# Patient Record
Sex: FEMALE | Race: WHITE | NOT HISPANIC OR LATINO | Employment: OTHER | URBAN - METROPOLITAN AREA
[De-identification: names, ages, dates, MRNs, and addresses within clinical notes are randomized per-mention and may not be internally consistent; named-entity substitution may affect disease eponyms.]

---

## 2017-01-19 ENCOUNTER — OFFICE VISIT (OUTPATIENT)
Dept: INTERNAL MEDICINE | Facility: CLINIC | Age: 67
End: 2017-01-19
Payer: MEDICARE

## 2017-01-19 ENCOUNTER — HOSPITAL ENCOUNTER (OUTPATIENT)
Dept: RADIOLOGY | Facility: CLINIC | Age: 67
Discharge: HOME | End: 2017-01-19
Attending: INTERNAL MEDICINE
Payer: COMMERCIAL

## 2017-01-19 ENCOUNTER — HOSPITAL ENCOUNTER (OUTPATIENT)
Dept: RADIOLOGY | Facility: CLINIC | Age: 67
Discharge: HOME | End: 2017-01-19
Attending: INTERNAL MEDICINE
Payer: MEDICARE

## 2017-01-19 VITALS
TEMPERATURE: 99 F | WEIGHT: 141.13 LBS | SYSTOLIC BLOOD PRESSURE: 110 MMHG | DIASTOLIC BLOOD PRESSURE: 60 MMHG | HEIGHT: 68 IN | BODY MASS INDEX: 21.39 KG/M2 | HEART RATE: 88 BPM | OXYGEN SATURATION: 98 %

## 2017-01-19 DIAGNOSIS — C50.412 MALIGNANT NEOPLASM OF UPPER-OUTER QUADRANT OF LEFT FEMALE BREAST: ICD-10-CM

## 2017-01-19 DIAGNOSIS — I10 HYPERTENSION, ESSENTIAL: Primary | ICD-10-CM

## 2017-01-19 DIAGNOSIS — E78.5 HYPERLIPIDEMIA, UNSPECIFIED HYPERLIPIDEMIA TYPE: ICD-10-CM

## 2017-01-19 DIAGNOSIS — M89.9 DISORDER OF BONE: ICD-10-CM

## 2017-01-19 PROCEDURE — 99213 OFFICE O/P EST LOW 20 MIN: CPT | Mod: PBBFAC | Performed by: FAMILY MEDICINE

## 2017-01-19 PROCEDURE — 99999 PR PBB SHADOW E&M-EST. PATIENT-LVL III: CPT | Mod: PBBFAC,,, | Performed by: FAMILY MEDICINE

## 2017-01-19 PROCEDURE — 99213 OFFICE O/P EST LOW 20 MIN: CPT | Mod: S$PBB,,, | Performed by: FAMILY MEDICINE

## 2017-01-19 PROCEDURE — 77080 DXA BONE DENSITY AXIAL: CPT | Mod: TC

## 2017-01-19 PROCEDURE — 77080 DXA BONE DENSITY AXIAL: CPT | Mod: 26,,, | Performed by: INTERNAL MEDICINE

## 2017-01-19 NOTE — PROGRESS NOTES
Subjective:       Patient ID: Graciela Aranda is a 66 y.o. female.    Chief Complaint: Follow-up    HPI  Review of Systems   Constitutional: Negative for chills, fatigue and fever.   HENT: Negative for congestion and trouble swallowing.    Eyes: Negative for redness.   Respiratory: Negative for cough, chest tightness and shortness of breath.    Cardiovascular: Negative for chest pain, palpitations and leg swelling.   Gastrointestinal: Negative for abdominal pain and blood in stool.   Genitourinary: Negative for hematuria and menstrual problem.   Musculoskeletal: Negative for arthralgias, back pain, gait problem, joint swelling, myalgias and neck pain.   Skin: Negative for color change and rash.   Neurological: Negative for tremors, speech difficulty, weakness, numbness and headaches.   Hematological: Negative for adenopathy. Does not bruise/bleed easily.   Psychiatric/Behavioral: Negative for behavioral problems, confusion and sleep disturbance. The patient is not nervous/anxious.        Objective:      Physical Exam   Constitutional: She is oriented to person, place, and time. She appears well-developed and well-nourished. No distress.   Neck: Neck supple.   Pulmonary/Chest: Effort normal.   Musculoskeletal: She exhibits no edema.        Right lower leg: She exhibits no edema.        Left lower leg: She exhibits no edema.   Neurological: She is alert and oriented to person, place, and time.   Skin: Skin is warm and dry. No rash noted.   Psychiatric: She has a normal mood and affect. Her behavior is normal. Judgment and thought content normal.   Nursing note and vitals reviewed.      Assessment:       1. Hypertension, essential    2. Hyperlipidemia, unspecified hyperlipidemia type    3. Malignant neoplasm of upper-outer quadrant of left female breast        Plan:   Graciela was seen today for follow-up.    Diagnoses and all orders for this visit:    Hypertension, essential  -     Basic metabolic panel; Future  -      Lipid panel; Future    Hyperlipidemia, unspecified hyperlipidemia type  -     Lipid panel; Future    Malignant neoplasm of upper-outer quadrant of left female breast      See meds, orders, follow up, routing and instructions sections of encounter.  A 66-year-old established female patient.  She is in for a followup on her blood   pressure, which is normal today.  She states that her mother is 92 with   semi-dementia and they are instituting care through [facility].  She is going to [place] soon.  I did suggest a flu shot prior to her   travels.  Continue her current medications.  I went over her laboratory with   her.      TEE/IN  dd: 01/19/2017 12:31:28 (CST)  td: 01/20/2017 00:40:17 (CST)  Doc ID   #[id number]  Job ID #[id number]    CC:

## 2017-01-19 NOTE — MR AVS SNAPSHOT
[facility]  [address]  [facility] [address]  Phone: [phone]  Fax: [phone]                  Graciela Aranda   2017 10:00 AM   Office Visit    Description:  Female : 1950   Provider:  Moy Patel MD   Department:  [facility]           Reason for Visit     Follow-up           Diagnoses this Visit        Comments    Hypertension, essential    -  Primary     Hyperlipidemia, unspecified hyperlipidemia type         Malignant neoplasm of upper-outer quadrant of left female breast                To Do List           Future Appointments        Provider Department Dept Phone    2017 11:00 AM [facility] [phone]      Goals (5 Years of Data)     None      Follow-Up and Disposition     Return in about 6 months (around 2017) for lab review (after future labs), Keep appointments with specialty providers..      [facility] On [facility]     [facility] On Call Nurse Care Line -  Assistance  Registered nurses in the [facility] On Call Center provide clinical advisement, health education, appointment booking, and other advisory services.  Call for this free service at [phone].             Medications           Message regarding Medications     Verify the changes and/or additions to your medication regime listed below are the same as discussed with your clinician today.  If any of these changes or additions are incorrect, please notify your healthcare provider.             Verify that the below list of medications is an accurate representation of the medications you are currently taking.  If none reported, the list may be blank. If incorrect, please contact your healthcare provider. Carry this list with you in case of emergency.           Current Medications     amlodipine (NORVASC) 2.5 MG tablet Take 1 tablet (2.5 mg total) by mouth once daily.    anastrozole (ARIMIDEX) 1 mg Tab Take 1 tablet (1 mg total) by mouth  "once daily.    epinephrine (EPIPEN) 0.3 mg/0.3 mL AtIn Inject 0.3 mLs (0.3 mg total) into the muscle once.    hydrochlorothiazide (HYDRODIURIL) 12.5 MG Tab Take 12.5 mg by mouth once daily.    ibuprofen (ADVIL,MOTRIN) 600 MG tablet TK 1 T PO Q 8 H  PRN P    lisinopril (PRINIVIL,ZESTRIL) 40 MG tablet Take 1 tablet (40 mg total) by mouth once daily.           Clinical Reference Information           Vital Signs - Last Recorded  Most recent update: 1/19/2017  9:51 AM by Ellie Dodson MA    BP Pulse Temp Ht Wt SpO2    110/60 88 99.3 °F (37.4 °C) 5' 8" (1.727 m) 64 kg (141 lb 1.5 oz) 98%    BMI                21.45 kg/m2          Blood Pressure          Most Recent Value    BP  110/60      Allergies as of 1/19/2017     Venom-wasp      Immunizations Administered on Date of Encounter - 1/19/2017     None      Orders Placed During Today's Visit     Future Labs/Procedures Expected by Expires    Basic metabolic panel  7/18/2017 (Approximate) 10/16/2017    Lipid panel  7/18/2017 (Approximate) 10/16/2017      [facility] Sign-Up     Activating your [facility] account is as easy as 1-2-3!     1) Visit [email or web address], select Sign Up Now, enter this activation code and your date of birth, then select Next.  Activation code not generated  Current Patient Portal Status: Account disabled      2) Create a username and password to use when you visit [facility] in the future and select a security question in case you lose your password and select Next.    3) Enter your e-mail address and click Sign Up!    Additional Information  If you have questions, please e-mail [email or web address] or call [phone] to talk to our [facility] staff. Remember, [facility] is NOT to be used for urgent needs. For medical emergencies, dial 911.         Smoking Cessation     If you would like to quit smoking:   You may be eligible for free services if you are a [place] resident and started smoking cigarettes before September 1, 1988.  Call the Smoking " Cessation Trust ([facility]) toll free at (423) 870-2783 or (536) 384-7623.   Call 7-800-QUIT-NOW if you do not meet the above criteria.

## 2017-01-25 ENCOUNTER — TELEPHONE (OUTPATIENT)
Dept: INTERNAL MEDICINE | Facility: CLINIC | Age: 67
End: 2017-01-25

## 2017-01-25 NOTE — TELEPHONE ENCOUNTER
----- Message from Jean Cabral sent at 1/23/2017  8:31 AM CST -----  Contact: self/ [phone] cell  Pt is calling to inform the office that she got her flu shot on Friday, the 20th.  She wants it added to her file.  Please call and advise.    Thank you

## 2017-03-11 DIAGNOSIS — I10 ESSENTIAL HYPERTENSION: ICD-10-CM

## 2017-03-13 RX ORDER — LISINOPRIL 40 MG/1
TABLET ORAL
Qty: 90 TABLET | Refills: 0 | Status: SHIPPED | OUTPATIENT
Start: 2017-03-13 | End: 2017-08-09 | Stop reason: SDUPTHER

## 2017-03-21 ENCOUNTER — OFFICE VISIT (OUTPATIENT)
Dept: HEMATOLOGY/ONCOLOGY | Facility: CLINIC | Age: 67
End: 2017-03-21
Payer: MEDICARE

## 2017-03-21 VITALS
DIASTOLIC BLOOD PRESSURE: 62 MMHG | OXYGEN SATURATION: 99 % | RESPIRATION RATE: 20 BRPM | TEMPERATURE: 99 F | BODY MASS INDEX: 22.43 KG/M2 | SYSTOLIC BLOOD PRESSURE: 138 MMHG | WEIGHT: 147.5 LBS | HEART RATE: 89 BPM

## 2017-03-21 DIAGNOSIS — C50.412 MALIGNANT NEOPLASM OF UPPER-OUTER QUADRANT OF LEFT FEMALE BREAST: Primary | ICD-10-CM

## 2017-03-21 DIAGNOSIS — E55.9 VITAMIN D DEFICIENCY: ICD-10-CM

## 2017-03-21 PROCEDURE — 99214 OFFICE O/P EST MOD 30 MIN: CPT | Mod: S$PBB,,, | Performed by: INTERNAL MEDICINE

## 2017-03-21 PROCEDURE — 99213 OFFICE O/P EST LOW 20 MIN: CPT | Mod: PBBFAC | Performed by: INTERNAL MEDICINE

## 2017-03-21 PROCEDURE — 99999 PR PBB SHADOW E&M-EST. PATIENT-LVL III: CPT | Mod: PBBFAC,,, | Performed by: INTERNAL MEDICINE

## 2017-03-21 NOTE — PROGRESS NOTES
Subjective:       Patient ID: Graciela Aranda is a 66 y.o. female.    Chief Complaint: Follow-up    HPI     Doing well in the interval.  Weight stable.  Good energy level- active.  No pain but still reports peripheral neuropathy which is stable.  Tolerating Arimidex well.  No musculoskeletal complaints  Not taking Vit D and calcium stopped with elevated level of calcium      Diagnosis:  This is a 66 year old female with T2N1 left breast cancer (ER/KY positive, HER-2 negative) history outlined below and currently on adjuvant Arimidex.  Oncology History:  The patient is a 66-year-old white female with left invasive breast cancer (T2 N1 M0) ER+, KY+, Her2 negative with positive sentinel lymph node from left axilla who completed 4 cycles of AC and 4 cycles of Taxol in the neoadjuvant setting.  Post bilateral mastectomy on 05/29/2015 with Dr. Monte.. Her final pathology revealed:  The left breast had a 2.2 cm of residual invasive ductal carcinoma of overall grade 2 disease. Surgical margins were negative.  The right breast revealed no evidence of invasive carcinoma. +ADH  There were six left sentinel lymph nodes sampled within four sentinel lymph node specimens;  three of these lymph nodes were positive, all of which were noted intraoperatively to be positive. The sizes of the metastatic foci were 3 mm, 2    mm and 3 mm respectively.   She had been consented for the NSABP B-51, an [place] trial with the positive lymph node intraoperatively. She was randomized to no    axillary lymph node dissection and will be proceeding with adjuvant left axillary radiotherapy.  She was initially scheduled for a left total complete mastectomy without reconstruction and with sentinel lymph node biopsy 11/03/2014. But the preop MRI, revealed a contralateral suspicious right-sided abnormality located at the 3 o'clock position of the right breast. The MRI also revealed a suspicious lymph node in the left axilla. We obtained target  ultrasound of both areas, which confirmed the areas of abnormality in both the right medial breast at the 3 o'clock position as well as a 15 mm left axillary lymph node.She has undergone ultrasound-guided core needle biopsy of the right medial breast mass as well as the left axillary lymph node.   The pathology from right breast mass show fibroadenoma but axillary lymph nodes is positive for involvement with adenocarcinoma ER/ND positive, HER-2/anderson negative tumor.    Her left-sided breast cancer also revealed an ER/ND positive, HER-2/anderson negative tumor that was approximately 5 cm on clinical presentation and on MRI, this mass also measured 41 mm in the middle lower outer region of the left breast located 5 cm from the nipple.       Health maintenance  BMD:  BONE MINERAL DENSITY RESULTS:  Lumbar Spine: Lumbar bone mineral density L1-L4 is 1.190g/cm2, which is a t-score of 1.3. The z-score is 3.2.    Total Hip: The total hip bone mineral density is 0.872g/cm2.  The t-score is -0.6, and the z-score is 0.8.  Femoral neck BMD is 0.770g/cm2 and the t-score is -0.7.    COMPARISONS:  Date Location BMD T-score  10/08/15 L-spine 1.179 1.2  Total Hip 0.873 -0.6   Impression    Elevated BMD of the lumbar spine. No significant change since prior study.      Recommendations:  1) Adequate calcium and Vitamin D therapy  2) Appropriate exercise  3) Consider x-ray of lumbar spine to evaluate elevated Z score of 3.2.  4) Stop smoking  5) Consider repeat BMD in 4 years       Colonoscopy - Had her colonoscopy in the interval which revealed 1 - 3mm polyp that was removed, hyperplastic on pathology. Repeat recommended in 5 years (2021).    Review of Systems   Constitutional: Negative for activity change, appetite change, chills, diaphoresis, fatigue, fever and unexpected weight change.   HENT: Negative for congestion, dental problem, ear pain, hearing loss, mouth sores, nosebleeds, rhinorrhea, tinnitus and trouble swallowing.    Eyes:  Negative for redness and visual disturbance.   Respiratory: Negative for cough, chest tightness, shortness of breath and wheezing.    Cardiovascular: Negative for chest pain, palpitations and leg swelling.   Gastrointestinal: Negative for abdominal distention, abdominal pain, blood in stool, constipation, diarrhea, nausea and vomiting.   Genitourinary: Negative for decreased urine volume, difficulty urinating, dysuria, frequency, hematuria and urgency.   Musculoskeletal: Negative for arthralgias, back pain, gait problem, joint swelling and neck pain.   Skin: Negative for color change, pallor, rash and wound.   Neurological: Negative for dizziness, weakness, light-headedness, numbness and headaches.   Hematological: Negative for adenopathy. Does not bruise/bleed easily.   Psychiatric/Behavioral: Negative for confusion, dysphoric mood and sleep disturbance.       Objective:      Physical Exam   Constitutional: She is oriented to person, place, and time. She appears well-developed and well-nourished. No distress.   Presents alone  ECOG=0   HENT:   Head: Normocephalic and atraumatic.   Right Ear: External ear normal.   Left Ear: External ear normal.   Nose: Nose normal.   Mouth/Throat: Oropharynx is clear and moist. No oropharyngeal exudate.   Eyes: Conjunctivae and EOM are normal. Pupils are equal, round, and reactive to light. Right eye exhibits no discharge. Left eye exhibits no discharge. No scleral icterus.   Neck: Normal range of motion. Neck supple. No tracheal deviation present. No thyromegaly present.   Cardiovascular: Normal rate, regular rhythm, normal heart sounds and intact distal pulses.  Exam reveals no gallop and no friction rub.    No murmur heard.  Pulmonary/Chest: Effort normal and breath sounds normal. No respiratory distress. She has no wheezes. She has no rales. She exhibits no tenderness. Right breast exhibits no skin change. Left breast exhibits no mass, no skin change and no tenderness. Breasts  are symmetrical.   Post bilateral mastectomy without evidence of chest wall recurrence  No lymphadenopathy   Abdominal: Soft. Bowel sounds are normal. She exhibits no distension and no mass. There is no tenderness. There is no rebound and no guarding.   No organomegaly   Musculoskeletal: Normal range of motion. She exhibits no edema.   Lymphadenopathy:     She has no cervical adenopathy.   Neurological: She is alert and oriented to person, place, and time. No cranial nerve deficit. She exhibits normal muscle tone. Coordination normal.   Skin: Skin is warm and dry. No rash noted. She is not diaphoretic. No erythema. No pallor.   Psychiatric: She has a normal mood and affect. Her behavior is normal. Judgment and thought content normal.   Nursing note and vitals reviewed.    Labs- reviewed  Assessment:       1. Malignant neoplasm of upper-outer quadrant of left female breast    2. Vitamin D deficiency        Plan:     1. ANU clinically  Continue Tamoxifen  Knows to call for any issues.  2. Restart Vit D only

## 2017-03-22 PROBLEM — E55.9 VITAMIN D DEFICIENCY: Status: ACTIVE | Noted: 2017-03-22

## 2017-04-17 RX ORDER — HYDROCHLOROTHIAZIDE 12.5 MG/1
TABLET ORAL
Qty: 90 TABLET | Refills: 0 | Status: SHIPPED | OUTPATIENT
Start: 2017-04-17 | End: 2017-12-26 | Stop reason: SDUPTHER

## 2017-04-25 DIAGNOSIS — C50.919 MALIGNANT NEOPLASM OF FEMALE BREAST, UNSPECIFIED LATERALITY, UNSPECIFIED SITE OF BREAST: ICD-10-CM

## 2017-04-25 RX ORDER — ANASTROZOLE 1 MG/1
1 TABLET ORAL DAILY
Qty: 30 TABLET | Refills: 5 | Status: SHIPPED | OUTPATIENT
Start: 2017-04-25 | End: 2018-01-19 | Stop reason: SDUPTHER

## 2017-06-01 ENCOUNTER — TELEPHONE (OUTPATIENT)
Dept: ADMINISTRATIVE | Facility: OTHER | Age: 67
End: 2017-06-01

## 2017-06-01 NOTE — TELEPHONE ENCOUNTER
----- Message from Lili Rahman sent at 5/31/2017  1:15 PM CDT -----  Contact: Self   Pt needs to make a f/u appt with Karina. Please call pt back at [phone]

## 2017-06-09 DIAGNOSIS — I10 ESSENTIAL HYPERTENSION: ICD-10-CM

## 2017-06-09 RX ORDER — LISINOPRIL 40 MG/1
TABLET ORAL
Qty: 90 TABLET | Refills: 0 | Status: SHIPPED | OUTPATIENT
Start: 2017-06-09 | End: 2017-09-05 | Stop reason: SDUPTHER

## 2017-06-27 ENCOUNTER — CLINICAL SUPPORT (OUTPATIENT)
Dept: SMOKING CESSATION | Facility: CLINIC | Age: 67
End: 2017-06-27
Payer: COMMERCIAL

## 2017-06-27 DIAGNOSIS — F17.200 NICOTINE DEPENDENCE: Primary | ICD-10-CM

## 2017-06-27 PROCEDURE — 99407 BEHAV CHNG SMOKING > 10 MIN: CPT | Mod: S$GLB,,,

## 2017-07-03 ENCOUNTER — OFFICE VISIT (OUTPATIENT)
Dept: INTERNAL MEDICINE | Facility: CLINIC | Age: 67
End: 2017-07-03
Payer: MEDICARE

## 2017-07-03 ENCOUNTER — LAB VISIT (OUTPATIENT)
Dept: LAB | Facility: HOSPITAL | Age: 67
End: 2017-07-03
Attending: INTERNAL MEDICINE
Payer: MEDICARE

## 2017-07-03 ENCOUNTER — OFFICE VISIT (OUTPATIENT)
Dept: HEMATOLOGY/ONCOLOGY | Facility: CLINIC | Age: 67
End: 2017-07-03
Payer: COMMERCIAL

## 2017-07-03 VITALS
RESPIRATION RATE: 16 BRPM | BODY MASS INDEX: 21.87 KG/M2 | TEMPERATURE: 98 F | WEIGHT: 147.69 LBS | HEIGHT: 69 IN | HEART RATE: 80 BPM | SYSTOLIC BLOOD PRESSURE: 160 MMHG | DIASTOLIC BLOOD PRESSURE: 76 MMHG

## 2017-07-03 VITALS
WEIGHT: 147.69 LBS | HEART RATE: 80 BPM | HEIGHT: 69 IN | DIASTOLIC BLOOD PRESSURE: 70 MMHG | BODY MASS INDEX: 21.87 KG/M2 | SYSTOLIC BLOOD PRESSURE: 166 MMHG

## 2017-07-03 DIAGNOSIS — Z00.00 ENCOUNTER FOR PREVENTIVE HEALTH EXAMINATION: Primary | ICD-10-CM

## 2017-07-03 DIAGNOSIS — C50.412 MALIGNANT NEOPLASM OF UPPER-OUTER QUADRANT OF LEFT FEMALE BREAST: ICD-10-CM

## 2017-07-03 DIAGNOSIS — C50.412 MALIGNANT NEOPLASM OF UPPER-OUTER QUADRANT OF LEFT FEMALE BREAST: Primary | ICD-10-CM

## 2017-07-03 DIAGNOSIS — E78.5 HYPERLIPIDEMIA, UNSPECIFIED HYPERLIPIDEMIA TYPE: ICD-10-CM

## 2017-07-03 DIAGNOSIS — F17.200 SMOKER: ICD-10-CM

## 2017-07-03 DIAGNOSIS — E55.9 VITAMIN D DEFICIENCY: ICD-10-CM

## 2017-07-03 DIAGNOSIS — I10 HYPERTENSION, ESSENTIAL: ICD-10-CM

## 2017-07-03 DIAGNOSIS — Z90.13 S/P BILATERAL MASTECTOMY: ICD-10-CM

## 2017-07-03 LAB
ALBUMIN SERPL BCP-MCNC: 4.2 G/DL
ALP SERPL-CCNC: 82 U/L
ALT SERPL W/O P-5'-P-CCNC: 21 U/L
ANION GAP SERPL CALC-SCNC: 13 MMOL/L
AST SERPL-CCNC: 17 U/L
BILIRUB SERPL-MCNC: 0.5 MG/DL
BUN SERPL-MCNC: 22 MG/DL
CALCIUM SERPL-MCNC: 10.3 MG/DL
CHLORIDE SERPL-SCNC: 104 MMOL/L
CO2 SERPL-SCNC: 20 MMOL/L
CREAT SERPL-MCNC: 1 MG/DL
ERYTHROCYTE [DISTWIDTH] IN BLOOD BY AUTOMATED COUNT: 13.8 %
EST. GFR  (AFRICAN AMERICAN): >60 ML/MIN/1.73 M^2
EST. GFR  (NON AFRICAN AMERICAN): 58.4 ML/MIN/1.73 M^2
GLUCOSE SERPL-MCNC: 107 MG/DL
HCT VFR BLD AUTO: 41.9 %
HGB BLD-MCNC: 14.2 G/DL
MCH RBC QN AUTO: 34.9 PG
MCHC RBC AUTO-ENTMCNC: 33.9 %
MCV RBC AUTO: 103 FL
NEUTROPHILS # BLD AUTO: 5.7 K/UL
PLATELET # BLD AUTO: 186 K/UL
PMV BLD AUTO: 10.1 FL
POTASSIUM SERPL-SCNC: 4.4 MMOL/L
PROT SERPL-MCNC: 7.4 G/DL
RBC # BLD AUTO: 4.07 M/UL
SODIUM SERPL-SCNC: 137 MMOL/L
WBC # BLD AUTO: 7.76 K/UL

## 2017-07-03 PROCEDURE — 1157F ADVNC CARE PLAN IN RCRD: CPT | Mod: ,,, | Performed by: INTERNAL MEDICINE

## 2017-07-03 PROCEDURE — 99999 PR PBB SHADOW E&M-EST. PATIENT-LVL III: CPT | Mod: PBBFAC,,, | Performed by: INTERNAL MEDICINE

## 2017-07-03 PROCEDURE — 85027 COMPLETE CBC AUTOMATED: CPT

## 2017-07-03 PROCEDURE — 99214 OFFICE O/P EST MOD 30 MIN: CPT | Mod: S$PBB,,, | Performed by: INTERNAL MEDICINE

## 2017-07-03 PROCEDURE — 1159F MED LIST DOCD IN RCRD: CPT | Mod: ,,, | Performed by: INTERNAL MEDICINE

## 2017-07-03 PROCEDURE — 1126F AMNT PAIN NOTED NONE PRSNT: CPT | Mod: ,,, | Performed by: INTERNAL MEDICINE

## 2017-07-03 PROCEDURE — G0438 PPPS, INITIAL VISIT: HCPCS | Mod: ,,, | Performed by: NURSE PRACTITIONER

## 2017-07-03 PROCEDURE — 36415 COLL VENOUS BLD VENIPUNCTURE: CPT

## 2017-07-03 PROCEDURE — 99999 PR PBB SHADOW E&M-EST. PATIENT-LVL III: CPT | Mod: PBBFAC,,, | Performed by: NURSE PRACTITIONER

## 2017-07-03 PROCEDURE — 99213 OFFICE O/P EST LOW 20 MIN: CPT | Mod: PBBFAC | Performed by: NURSE PRACTITIONER

## 2017-07-03 PROCEDURE — 80053 COMPREHEN METABOLIC PANEL: CPT

## 2017-07-03 NOTE — Clinical Note
Dr. Briceño,  I had the pleasure of seeing Ms. Aranda for an annual wellness visit today.  I scheduled her an appointment for 8/9/17 with you for a routine follow up and a blood pressure check.  In your last note you mentioned wanting lab work on her before your next visit.  I saw a lipid panel ordered.  Are there any other labs you want ordered?  If you want her to get labs, could Sandy or Ellie call her to schedule a lab appointment prior to her visit with you on August 9th.  Thank you.  Hoda Aaron NP

## 2017-07-03 NOTE — PROGRESS NOTES
"Graciela Aranda presented for an initial Medicare AWV today. The following components were reviewed and updated:    · Medical history  · Family History  · Social history  · Allergies and Current Medications  · Health Risk Assessment  · Health Maintenance  · Care Team    **See Completed Assessments for Annual Wellness visit with in the encounter summary    The following assessments were completed:  · Depression Screening  · Cognitive function Screening  · Timed Get Up Test  · Whisper Test            Vitals:    07/03/17 1306   BP: (!) 166/70   BP Location: Left arm   Patient Position: Sitting   Pulse: 80   Weight: 67 kg (147 lb 11.3 oz)   Height: 5' 8.5" (1.74 m)     Body mass index is 22.13 kg/m².  Waist Measurements: 34 in (in)]    Diagnoses and health risks identified today and associated recommendations/orders:  1. Encounter for preventive health examination  Assessments completed  Preventative health recommendations reviewed    2. Hypertension, essential  Elevated at today's visit.  Patient says that she gets extremely anxious when going to her hem/onc appointments.    She says her bp's have been running in the 120s/70s at home.  She denies headache, chest pain, sob, and sudden vision changes.  Follow up appointment made for august with her PCP for routine follow up and a bp check.  She will continue to monitor her blood pressures at home in the meantime  Followed by pcp.     3. Malignant neoplasm of upper-outer quadrant of left female breast  Stable. Hx of bilateral mastectomy and radiation therapy  Controlled with current medical therapy, on arimidex  Followed by hem/onc    4. S/P bilateral mastectomy  Stable.   Controlled with current medical therapy  Followed by hem/onc    5. Hyperlipidemia, unspecified hyperlipidemia type  Stable.   Diet controlled  Followed by PCP.     6. Smoker  Stable.   Involved in smoking cessation program currently  Followed by PCP.     Provided Graciela with a 5-10 year written screening " schedule and personal prevention plan. Recommendations were developed using the USPSTF age appropriate recommendations. Education, counseling, and referrals were provided as needed.  After Visit Summary printed and given to patient which includes a list of additional screenings\tests needed.    Return in 5 weeks (on 8/9/2017) for routine visit with your primary care provider or sooner if problems arise. .      Hoda Aaron, NP

## 2017-07-03 NOTE — PATIENT INSTRUCTIONS
Counseling and Referral of Other Preventative  (Italic type indicates deductible and co-insurance are waived)    Patient Name: Graciela Aranda  Today's Date: 7/3/2017      SERVICE LIMITATIONS RECOMMENDATION    Vaccines    · Pneumococcal (once after 65)    · Influenza (annually)    · Hepatitis B (if medium/high risk)    · Prevnar 13      Hepatitis B medium/high risk factors:       - End-stage renal disease       - Hemophiliacs who received Factor VII or         IX concentrates       - Clients of institutions for the mentally             retarded       - Persons who live in the same house as          a HepB carrier       - Homosexual men       - Illicit injectable drug abusers     Pneumococcal: Due one year after your prevnar vaccine which was 07/27/16     Influenza: Due in September/october of this year     Hepatitis B: N/A     Prevnar 13: Done, no repeat necessary    Mammogram (biennial age 50-74)  Annually (age 40 or over)  N/A    Pap (up to age 70 and after 70 if unknown history or abnormal study last 10 years)    N/A     The USPSTF recommends against screening for cervical cancer in women older than age 65 years who have had adequate prior screening and are not otherwise at high risk for cervical cancer.      Colorectal cancer screening (to age 75)    · Fecal occult blood test (annual)  · Flexible sigmoidoscopy (5y)  · Screening colonoscopy (10y)  · Barium enema   Last done 06/01/16, recommend to repeat every 5  years    Diabetes self-management training (no USPSTF recommendations)  Requires referral by treating physician for patient with diabetes or renal disease. 10 hours of initial DSMT sessions of no less than 30 minutes each in a continuous 12-month period. 2 hours of follow-up DSMT in subsequent years.  N/A    Bone mass measurements (age 65 & older, biennial)  Requires diagnosis related to osteoporosis or estrogen deficiency. Biennial benefit unless patient has history of long-term glucocorticoid  Last done  01/19/17, recommend to repeat every 4  years    Glaucoma screening (no USPSTF recommendation)  Diabetes mellitus, family history   , age 50 or over    American, age 65 or over  Due for visit    Medical nutrition therapy for diabetes or renal disease (no recommended schedule)  Requires referral by treating physician for patient with diabetes or renal disease or kidney transplant within the past 3 years.  Can be provided in same year as diabetes self-management training (DSMT), and CMS recommends medical nutrition therapy take place after DSMT. Up to 3 hours for initial year and 2 hours in subsequent years.  N/A    Cardiovascular screening blood tests (every 5 years)  · Fasting lipid panel  Order as a panel if possible  Last done 03/14/16, recommend to repeat every 1  years    Diabetes screening tests (at least every 3 years, Medicare covers annually or at 6-month intervals for prediabetic patients)  · Fasting blood sugar (FBS) or glucose tolerance test (GTT)  Patient must be diagnosed with one of the following:       - Hypertension       - Dyslipidemia       - Obesity (BMI 30kg/m2)       - Previous elevated impaired FBS or GTT       ... or any two of the following:       - Overweight (BMI 25 but <30)       - Family history of diabetes       - Age 65 or older       - History of gestational diabetes or birth of baby weighing more than 9 pounds  Done this year, repeat every year    Abdominal aortic aneurysm screening (once)  · Sonogram   Limited to patients who meet one of the following criteria:       - Men who are 65-75 years old and have smoked more than 100 cigarette in their lifetime       - Anyone with a family history of abdominal aortic aneurysm       - Anyone recommended for screening by the USPSTF  N/A    HIV screening (annually for increased risk patients)  · HIV-1 and HIV-2 by EIA, or DEBI, rapid antibody test or oral mucosa transudate  Patients must be at increased risk for HIV  infection per USPSTF guidelines or pregnant. Tests covered annually for patient at increased risk or as requested by the patient. Pregnant patients may receive up to 3 tests during pregnancy.  Risks discussed, screening is not recommended    Smoking cessation counseling (up to 8 sessions per year)  Patients must be asymptomatic of tobacco-related conditions to receive as a preventative service.  Goint to smoking cessation    Subsequent annual wellness visit  At least 12 months since last AWV  Return in one year     The following information is provided to all patients.  This information is to help you find resources for any of the problems found today that may be affecting your health:                Living healthy guide: www.Counts include 234 beds at the [facility].[place]      Understanding Diabetes: www.diabetes.org      Eating healthy: www.cdc.gov/healthyweight      [facility] home safety checklist: www.cdc.gov/steadi/patient.html      Agency on Aging: www.goea.[place].[facility]      Alcoholics anonymous (AA): www.aa.org      Physical Activity: www.chinedu.nih.gov/ea5wtjf      Tobacco use: www.quitwithusla.org

## 2017-07-03 NOTE — PROGRESS NOTES
Subjective:       Patient ID: Graciela Aranda is a 66 y.o. female.    Chief Complaint: No chief complaint on file.    HPI     Doing well in the interval.  Bought a house in [place], [place] in the interval.    Weight stable.  Good energy level- active.  No pain but still reports peripheral neuropathy which is stable.  Tolerating Arimidex well.  No musculoskeletal complaints  Not taking Vit D and calcium stopped with elevated level of calcium      Diagnosis:  This is a 66 year old female with T2N1 left breast cancer (ER/AK positive, HER-2 negative) history outlined below and currently on adjuvant Arimidex.  Oncology History:  The patient is a 66-year-old white female with left invasive breast cancer (T2 N1 M0) ER+, AK+, Her2 negative with positive sentinel lymph node from left axilla who completed 4 cycles of AC and 4 cycles of Taxol in the neoadjuvant setting.  Post bilateral mastectomy on 05/29/2015 with Dr. Monte.. Her final pathology revealed:  The left breast had a 2.2 cm of residual invasive ductal carcinoma of overall grade 2 disease. Surgical margins were negative.  The right breast revealed no evidence of invasive carcinoma. +ADH  There were six left sentinel lymph nodes sampled within four sentinel lymph node specimens;  three of these lymph nodes were positive, all of which were noted intraoperatively to be positive. The sizes of the metastatic foci were 3 mm, 2    mm and 3 mm respectively.   She had been consented for the NSABP B-51, an Concord trial with the positive lymph node intraoperatively. She was randomized to no    axillary lymph node dissection and will be proceeding with adjuvant left axillary radiotherapy.  She was initially scheduled for a left total complete mastectomy without reconstruction and with sentinel lymph node biopsy 11/03/2014. But the preop MRI, revealed a contralateral suspicious right-sided abnormality located at the 3 o'clock position of the right breast. The MRI also revealed a  suspicious lymph node in the left axilla. We obtained target ultrasound of both areas, which confirmed the areas of abnormality in both the right medial breast at the 3 o'clock position as well as a 15 mm left axillary lymph node.She has undergone ultrasound-guided core needle biopsy of the right medial breast mass as well as the left axillary lymph node.   The pathology from right breast mass show fibroadenoma but axillary lymph nodes is positive for involvement with adenocarcinoma ER/VA positive, HER-2/anderson negative tumor.    Her left-sided breast cancer also revealed an ER/VA positive, HER-2/anderson negative tumor that was approximately 5 cm on clinical presentation and on MRI, this mass also measured 41 mm in the middle lower outer region of the left breast located 5 cm from the nipple.       Health maintenance  BMD:  BONE MINERAL DENSITY RESULTS:  Lumbar Spine: Lumbar bone mineral density L1-L4 is 1.190g/cm2, which is a t-score of 1.3. The z-score is 3.2.    Total Hip: The total hip bone mineral density is 0.872g/cm2.  The t-score is -0.6, and the z-score is 0.8.  Femoral neck BMD is 0.770g/cm2 and the t-score is -0.7.    COMPARISONS:  Date Location BMD T-score  10/08/15 L-spine 1.179 1.2  Total Hip 0.873 -0.6   Impression    Elevated BMD of the lumbar spine. No significant change since prior study.      Recommendations:  1) Adequate calcium and Vitamin D therapy  2) Appropriate exercise  3) Consider x-ray of lumbar spine to evaluate elevated Z score of 3.2.  4) Stop smoking  5) Consider repeat BMD in 4 years         Colonoscopy - Had her colonoscopy in the interval which revealed 1 - 3mm polyp that was removed, hyperplastic on pathology. Repeat recommended in 5 years (2021).      Review of Systems   Constitutional: Negative for activity change, appetite change, chills, diaphoresis, fatigue, fever and unexpected weight change.   HENT: Negative for congestion, dental problem, ear pain, hearing loss, mouth sores,  nosebleeds, rhinorrhea, tinnitus and trouble swallowing.    Eyes: Negative for redness and visual disturbance.   Respiratory: Negative for cough, chest tightness, shortness of breath and wheezing.    Cardiovascular: Negative for chest pain, palpitations and leg swelling.   Gastrointestinal: Negative for abdominal distention, abdominal pain, blood in stool, constipation, diarrhea, nausea and vomiting.   Genitourinary: Negative for decreased urine volume, difficulty urinating, dysuria, frequency, hematuria and urgency.   Musculoskeletal: Negative for arthralgias, back pain, gait problem, joint swelling and neck pain.   Skin: Negative for color change, pallor, rash and wound.   Neurological: Negative for dizziness, weakness, light-headedness, numbness and headaches.   Hematological: Negative for adenopathy. Does not bruise/bleed easily.   Psychiatric/Behavioral: Negative for confusion, dysphoric mood and sleep disturbance.       Objective:      Physical Exam   Constitutional: She is oriented to person, place, and time. She appears well-developed and well-nourished. No distress.   Presents alone   HENT:   Head: Normocephalic and atraumatic.   Right Ear: External ear normal.   Left Ear: External ear normal.   Nose: Nose normal.   Mouth/Throat: Oropharynx is clear and moist. No oropharyngeal exudate.   Eyes: Conjunctivae and EOM are normal. Pupils are equal, round, and reactive to light. Right eye exhibits no discharge. Left eye exhibits no discharge. No scleral icterus.   Neck: Normal range of motion. Neck supple. No tracheal deviation present. No thyromegaly present.   Cardiovascular: Normal rate, regular rhythm, normal heart sounds and intact distal pulses.  Exam reveals no gallop and no friction rub.    No murmur heard.  Pulmonary/Chest: Effort normal and breath sounds normal. No respiratory distress. She has no wheezes. She has no rales. She exhibits no tenderness. Right breast exhibits no skin change. Left breast  exhibits no mass, no skin change and no tenderness. Breasts are symmetrical.   Post bilateral mastectomy without evidence of chest wall recurrence  No lymphadenopathy   Abdominal: Soft. Bowel sounds are normal. She exhibits no distension and no mass. There is no tenderness. There is no rebound and no guarding.   No organomegaly   Musculoskeletal: Normal range of motion. She exhibits no edema.   Lymphadenopathy:     She has no cervical adenopathy.   Neurological: She is alert and oriented to person, place, and time. No cranial nerve deficit. She exhibits normal muscle tone. Coordination normal.   Skin: Skin is warm and dry. No rash noted. She is not diaphoretic. No erythema. No pallor.   Psychiatric: She has a normal mood and affect. Her behavior is normal. Judgment and thought content normal.   Nursing note and vitals reviewed.    Labs- reviewed  Assessment:       1. Malignant neoplasm of upper-outer quadrant of left female breast    2. Vitamin D deficiency        Plan:     1. Continue Arimidex  2. On replacement    RTC 6 months, labs prior

## 2017-07-07 ENCOUNTER — TELEPHONE (OUTPATIENT)
Dept: INTERNAL MEDICINE | Facility: CLINIC | Age: 67
End: 2017-07-07

## 2017-07-13 ENCOUNTER — CLINICAL SUPPORT (OUTPATIENT)
Dept: SMOKING CESSATION | Facility: CLINIC | Age: 67
End: 2017-07-13
Payer: COMMERCIAL

## 2017-07-13 VITALS
WEIGHT: 148.38 LBS | BODY MASS INDEX: 22.23 KG/M2 | HEART RATE: 83 BPM | DIASTOLIC BLOOD PRESSURE: 76 MMHG | SYSTOLIC BLOOD PRESSURE: 142 MMHG

## 2017-07-13 DIAGNOSIS — F17.210 NICOTINE DEPENDENCE, CIGARETTES, UNCOMPLICATED: Primary | ICD-10-CM

## 2017-07-13 PROCEDURE — 99404 PREV MED CNSL INDIV APPRX 60: CPT | Mod: S$GLB,,,

## 2017-07-13 RX ORDER — IBUPROFEN 200 MG
1 TABLET ORAL DAILY
Qty: 28 PATCH | Refills: 0 | Status: SHIPPED | OUTPATIENT
Start: 2017-07-13 | End: 2017-08-02 | Stop reason: SDUPTHER

## 2017-07-13 RX ORDER — DIPHENHYDRAMINE HCL 25 MG
4 CAPSULE ORAL
Qty: 220 EACH | Refills: 0 | Status: SHIPPED | OUTPATIENT
Start: 2017-07-13 | End: 2017-09-22

## 2017-07-13 NOTE — PROGRESS NOTES
7/13/17      See Smoking Cessation Smart Form    Additional Interventions:  · Recommended patient participate in Smoking Cessation Group .  · Discussed triggers and planning for quit date.  · Given patient education handouts from [employer] Tool Kit #3  · Educated patient about and gave patient education handouts from  [facility] Information on: NRT  · Provided phone number to reach [facility]essation [facility] CTTS (Certified Tobacco Treatment Specialist) for future assistance and numbers to 24/7 Quit lines.

## 2017-08-02 ENCOUNTER — LAB VISIT (OUTPATIENT)
Dept: LAB | Facility: HOSPITAL | Age: 67
End: 2017-08-02
Attending: FAMILY MEDICINE
Payer: MEDICARE

## 2017-08-02 ENCOUNTER — CLINICAL SUPPORT (OUTPATIENT)
Dept: SMOKING CESSATION | Facility: CLINIC | Age: 67
End: 2017-08-02
Payer: COMMERCIAL

## 2017-08-02 DIAGNOSIS — F17.210 NICOTINE DEPENDENCE, CIGARETTES, UNCOMPLICATED: ICD-10-CM

## 2017-08-02 DIAGNOSIS — F17.210 NICOTINE DEPENDENCE, CIGARETTES, UNCOMPLICATED: Primary | ICD-10-CM

## 2017-08-02 DIAGNOSIS — I10 HYPERTENSION, ESSENTIAL: ICD-10-CM

## 2017-08-02 DIAGNOSIS — E78.5 HYPERLIPIDEMIA, UNSPECIFIED HYPERLIPIDEMIA TYPE: ICD-10-CM

## 2017-08-02 LAB
CHOLEST/HDLC SERPL: 3.3 {RATIO}
HDL/CHOLESTEROL RATIO: 30 %
HDLC SERPL-MCNC: 260 MG/DL
HDLC SERPL-MCNC: 78 MG/DL
LDLC SERPL CALC-MCNC: 161 MG/DL
NONHDLC SERPL-MCNC: 182 MG/DL
TRIGL SERPL-MCNC: 105 MG/DL

## 2017-08-02 PROCEDURE — 80061 LIPID PANEL: CPT

## 2017-08-02 PROCEDURE — 99402 PREV MED CNSL INDIV APPRX 30: CPT | Mod: S$GLB,,,

## 2017-08-02 PROCEDURE — 36415 COLL VENOUS BLD VENIPUNCTURE: CPT

## 2017-08-02 RX ORDER — IBUPROFEN 200 MG
1 TABLET ORAL DAILY
Qty: 28 PATCH | Refills: 0 | Status: SHIPPED | OUTPATIENT
Start: 2017-08-02 | End: 2017-08-16 | Stop reason: SDUPTHER

## 2017-08-02 NOTE — PROGRESS NOTES
Individual Follow-Up Form    8/2/2017    Quit Date: Has not quit yet.    Clinical Status of Patient: Outpatient    Length of Service: 30 minutes    Continuing Medication:   Nicotine patches & gum    Target Symptoms: Withdrawal and medication side effects. The following were  rated moderate (3) to severe (4) on TCRS:  · Moderate (3):  urges, anxiety  · Severe (4): none    Comments:   Patient has reduced smoking by over 50% and was proud of this accomplishment.  She didn't start using the nicotine patches until 7/13 because she didn't want to start the quit process when she knew she was going out of town.  She has not started using the gum, instead she is using regular gum.  She denied side effects from the nicotine patch.  She is still buying cigarettes and has great difficulty imagining a quit date.  We discussed at length her fears regarding identifying a planned quit date and I assisted her to re-frame her thinking about it to decrease anticipation and anxiety.  I offered to send her some internet sites to help her learn about mindfulness and urges.  She said she would like that so I will send the sites to her today.    Diagnosis: F17.210    Next Visit:   8/16/17

## 2017-08-09 ENCOUNTER — OFFICE VISIT (OUTPATIENT)
Dept: INTERNAL MEDICINE | Facility: CLINIC | Age: 67
End: 2017-08-09
Attending: FAMILY MEDICINE
Payer: MEDICARE

## 2017-08-09 VITALS
DIASTOLIC BLOOD PRESSURE: 72 MMHG | BODY MASS INDEX: 22.53 KG/M2 | HEIGHT: 68 IN | HEART RATE: 92 BPM | WEIGHT: 148.63 LBS | SYSTOLIC BLOOD PRESSURE: 142 MMHG

## 2017-08-09 DIAGNOSIS — Z90.13 S/P BILATERAL MASTECTOMY: ICD-10-CM

## 2017-08-09 DIAGNOSIS — C50.412 MALIGNANT NEOPLASM OF UPPER-OUTER QUADRANT OF LEFT FEMALE BREAST, UNSPECIFIED ESTROGEN RECEPTOR STATUS: ICD-10-CM

## 2017-08-09 DIAGNOSIS — Z00.00 ANNUAL PHYSICAL EXAM: Primary | ICD-10-CM

## 2017-08-09 DIAGNOSIS — F17.200 SMOKER: ICD-10-CM

## 2017-08-09 DIAGNOSIS — I10 HYPERTENSION, ESSENTIAL: ICD-10-CM

## 2017-08-09 DIAGNOSIS — L98.9 SKIN LESION: ICD-10-CM

## 2017-08-09 DIAGNOSIS — E78.5 HYPERLIPIDEMIA, UNSPECIFIED HYPERLIPIDEMIA TYPE: ICD-10-CM

## 2017-08-09 PROCEDURE — 99213 OFFICE O/P EST LOW 20 MIN: CPT | Mod: PBBFAC | Performed by: FAMILY MEDICINE

## 2017-08-09 PROCEDURE — 99214 OFFICE O/P EST MOD 30 MIN: CPT | Mod: S$PBB,,, | Performed by: FAMILY MEDICINE

## 2017-08-09 PROCEDURE — 3077F SYST BP >= 140 MM HG: CPT | Mod: ,,, | Performed by: FAMILY MEDICINE

## 2017-08-09 PROCEDURE — 3078F DIAST BP <80 MM HG: CPT | Mod: ,,, | Performed by: FAMILY MEDICINE

## 2017-08-09 PROCEDURE — 99999 PR PBB SHADOW E&M-EST. PATIENT-LVL III: CPT | Mod: PBBFAC,,, | Performed by: FAMILY MEDICINE

## 2017-08-09 PROCEDURE — 1157F ADVNC CARE PLAN IN RCRD: CPT | Mod: ,,, | Performed by: FAMILY MEDICINE

## 2017-08-09 PROCEDURE — 90732 PPSV23 VACC 2 YRS+ SUBQ/IM: CPT | Mod: PBBFAC

## 2017-08-09 PROCEDURE — 1126F AMNT PAIN NOTED NONE PRSNT: CPT | Mod: ,,, | Performed by: FAMILY MEDICINE

## 2017-08-09 PROCEDURE — 3008F BODY MASS INDEX DOCD: CPT | Mod: ,,, | Performed by: FAMILY MEDICINE

## 2017-08-09 PROCEDURE — 1159F MED LIST DOCD IN RCRD: CPT | Mod: ,,, | Performed by: FAMILY MEDICINE

## 2017-08-09 RX ORDER — AMLODIPINE BESYLATE 5 MG/1
5 TABLET ORAL DAILY
Qty: 30 TABLET | Refills: 5 | Status: SHIPPED | OUTPATIENT
Start: 2017-08-09 | End: 2017-09-22 | Stop reason: SDUPTHER

## 2017-08-09 RX ORDER — ATORVASTATIN CALCIUM 20 MG/1
20 TABLET, FILM COATED ORAL DAILY
Qty: 30 TABLET | Refills: 5 | Status: SHIPPED | OUTPATIENT
Start: 2017-08-09 | End: 2018-02-02 | Stop reason: SDUPTHER

## 2017-08-09 NOTE — PROGRESS NOTES
Subjective:       Patient ID: Graciela Aranda is a 66 y.o. female.    Chief Complaint: Follow-up    Established patient for an annual wellness check/physical exam. No specific complaints, please see ROS.        Review of Systems   Constitutional: Negative for chills, fatigue and fever.   HENT: Negative for congestion and trouble swallowing.    Eyes: Negative for redness.   Respiratory: Negative for cough, chest tightness and shortness of breath.    Cardiovascular: Negative for chest pain, palpitations and leg swelling.   Gastrointestinal: Negative for abdominal pain and blood in stool.   Genitourinary: Negative for hematuria and menstrual problem.   Musculoskeletal: Negative for arthralgias, back pain, gait problem, joint swelling, myalgias and neck pain.   Skin: Negative for color change and rash.   Neurological: Negative for tremors, speech difficulty, weakness, numbness and headaches.   Hematological: Negative for adenopathy. Does not bruise/bleed easily.   Psychiatric/Behavioral: Negative for behavioral problems, confusion and sleep disturbance. The patient is not nervous/anxious.        Objective:      Physical Exam   Constitutional: She is oriented to person, place, and time. She appears well-developed and well-nourished.   HENT:   Head: Normocephalic.   Right Ear: Tympanic membrane, external ear and ear canal normal.   Left Ear: Tympanic membrane, external ear and ear canal normal.   Mouth/Throat: Oropharynx is clear and moist.   Eyes: Pupils are equal, round, and reactive to light. No scleral icterus.   Neck: Normal range of motion. Neck supple. Carotid bruit is not present. No thyromegaly present.   Cardiovascular: Normal rate, regular rhythm, normal heart sounds and intact distal pulses.  Exam reveals no gallop and no friction rub.    No murmur heard.  Pulmonary/Chest: Effort normal. She has decreased breath sounds. She exhibits no tenderness.   Abdominal: Soft. Bowel sounds are normal. She exhibits no  distension and no mass. There is no tenderness. There is no rebound and no guarding.   Musculoskeletal: Normal range of motion. She exhibits no edema or tenderness.   Lymphadenopathy:     She has no cervical adenopathy.   Neurological: She is alert and oriented to person, place, and time. She has normal strength. She displays normal reflexes. No cranial nerve deficit or sensory deficit. Coordination and gait normal.   Skin: Skin is warm and dry. No rash noted.   Psychiatric: She has a normal mood and affect. Her behavior is normal. Judgment and thought content normal.   Nursing note and vitals reviewed.      Assessment:       1. Annual physical exam    2. Hypertension, essential    3. Hyperlipidemia, unspecified hyperlipidemia type    4. Malignant neoplasm of upper-outer quadrant of left female breast, unspecified estrogen receptor status    5. S/P bilateral mastectomy    6. Smoker    7. Skin lesion        Plan:   Graciela was seen today for follow-up.    Diagnoses and all orders for this visit:    Annual physical exam    Hypertension, essential  -     NURSING COMMUNICATION: Create [facility] Account  -     Hypertension agencyQ Medicine ([facility]) Enrollment Order  -     Hypertension Digital Medicine ([facility]): Assign Onboarding Questionnaires    Hyperlipidemia, unspecified hyperlipidemia type    Malignant neoplasm of upper-outer quadrant of left female breast, unspecified estrogen receptor status    S/P bilateral mastectomy    Smoker    Skin lesion  -     Ambulatory referral to Dermatology    Other orders  -     Pneumococcal Polysaccharide Vaccine (23 Valent) (SQ/IM)  -     amlodipine (NORVASC) 5 MG tablet; Take 1 tablet (5 mg total) by mouth once daily.  -     atorvastatin (LIPITOR) 20 MG tablet; Take 1 tablet (20 mg total) by mouth once daily.      See meds, orders, follow up, routing and instructions sections of encounter.  This is a 66-year-old established female patient.  She is in for a semi-annual   check/physical  examination.    Blood pressure was elevated.  Increased Norvasc from 2.5 to 5 mg with a blood   pressure check in three weeks.    Discussed her Cohort calculation of 20.6% based on her recent lipid numbers;   recommended atorvastatin 20 mg for a start.  Side effects discussed.  The   patient is due for pneumococcal 23.  Diet and exercise discussed.  Smoking   cessation discussed and recommended digital hypertension should she be able to   secure a smartphone.        /ariela [id number] blank(s)        TEE/IN  dd: 08/09/2017 14:36:38 (CDT)  td: 08/10/2017 01:29:49 (CDT)  Doc ID   #[id number]  Job ID #[id number]    CC:

## 2017-08-16 ENCOUNTER — CLINICAL SUPPORT (OUTPATIENT)
Dept: SMOKING CESSATION | Facility: CLINIC | Age: 67
End: 2017-08-16
Payer: COMMERCIAL

## 2017-08-16 ENCOUNTER — PATIENT MESSAGE (OUTPATIENT)
Dept: INTERNAL MEDICINE | Facility: CLINIC | Age: 67
End: 2017-08-16

## 2017-08-16 DIAGNOSIS — I10 HYPERTENSION, ESSENTIAL: Primary | ICD-10-CM

## 2017-08-16 DIAGNOSIS — F17.210 NICOTINE DEPENDENCE, CIGARETTES, UNCOMPLICATED: Primary | ICD-10-CM

## 2017-08-16 DIAGNOSIS — F17.210 NICOTINE DEPENDENCE, CIGARETTES, UNCOMPLICATED: ICD-10-CM

## 2017-08-16 PROCEDURE — 99403 PREV MED CNSL INDIV APPRX 45: CPT | Mod: S$GLB,,,

## 2017-08-16 RX ORDER — IBUPROFEN 200 MG
1 TABLET ORAL DAILY
Qty: 28 PATCH | Refills: 0 | Status: SHIPPED | OUTPATIENT
Start: 2017-08-16 | End: 2018-08-22 | Stop reason: SDUPTHER

## 2017-08-16 NOTE — PROGRESS NOTES
"Individual Follow-Up Form    8/16/2017    Quit Date: Has not quit yet    Clinical Status of Patient: Outpatient    Length of Service: 45 minutes    Continuing Medication:  Nicotine patch    Target Symptoms: Withdrawal and medication side effects. The following were  rated moderate (3) to severe (4) on TCRS:  · Moderate (3): urges/cravings, anxiety  · Severe (4): none    Comments:   Patient reported being very proud of herself because for 4 days when she was with her grandchildren she cut down her smoking considerably. She is using the nicotine patches without side effects and regular non-nicotine gum.  When she returned home her pattern continued Iie. Just under a pack a day i.e. 16/day.  She becomes easily overwhelmed with the idea of completely quitting.  We reviewed a "normal" smoking day for her, times she has the cigarettes, prompting events to smoking etc.  Her description left many questions and I recommended keeping a mindful record of her smoking via "pack tracks" to get more specific information to work with to help her taper down and eventually completely quit in a manner she feels is tolerable and realistic.  I gave her a supply of pack tracks and explained how to fill them out.  The plan is to review the pack tracks next visit and start to pick 2 cigarettes at a time that she will make a plan for to cut these out of her day.  I reassured her we will take small steps toward her ultimate goal so she has success experiences helping to motivate her.  She verbalized feeling comfortable with this plan.    Diagnosis: F17.210    Next Visit:  Patient [place] a distance from the Clinic and it works best for her when she can coordinate Smoking Cessation Clinic visits with her PCP appointments.  She is going to call as soon as she schedules with her PCP and we will attempt to schedule appointment on the same day.  We will follow up via phone as needed.  "

## 2017-08-24 ENCOUNTER — CLINICAL SUPPORT (OUTPATIENT)
Dept: SMOKING CESSATION | Facility: CLINIC | Age: 67
End: 2017-08-24
Payer: MEDICARE

## 2017-08-24 DIAGNOSIS — F17.210 NICOTINE DEPENDENCE, CIGARETTES, UNCOMPLICATED: Primary | ICD-10-CM

## 2017-08-24 PROCEDURE — 99407 BEHAV CHNG SMOKING > 10 MIN: CPT | Mod: S$GLB,,,

## 2017-09-05 ENCOUNTER — PATIENT MESSAGE (OUTPATIENT)
Dept: INTERNAL MEDICINE | Facility: CLINIC | Age: 67
End: 2017-09-05

## 2017-09-05 DIAGNOSIS — I10 ESSENTIAL HYPERTENSION: ICD-10-CM

## 2017-09-05 RX ORDER — LISINOPRIL 40 MG/1
TABLET ORAL
Qty: 90 TABLET | Refills: 0 | Status: SHIPPED | OUTPATIENT
Start: 2017-09-05 | End: 2017-12-04 | Stop reason: SDUPTHER

## 2017-09-07 ENCOUNTER — CLINICAL SUPPORT (OUTPATIENT)
Dept: SMOKING CESSATION | Facility: CLINIC | Age: 67
End: 2017-09-07
Payer: MEDICARE

## 2017-09-07 DIAGNOSIS — F17.210 NICOTINE DEPENDENCE, CIGARETTES, UNCOMPLICATED: Primary | ICD-10-CM

## 2017-09-07 PROCEDURE — 99407 BEHAV CHNG SMOKING > 10 MIN: CPT | Mod: S$GLB,,,

## 2017-09-08 ENCOUNTER — TELEPHONE (OUTPATIENT)
Dept: HEMATOLOGY/ONCOLOGY | Facility: CLINIC | Age: 67
End: 2017-09-08

## 2017-09-08 NOTE — TELEPHONE ENCOUNTER
----- Message from Eliz Rajput sent at 9/7/2017  1:58 PM CDT -----  Contact: pt  Pt contact [phone]    Pt needs to abelardo appt  For office visit and lab    Please give pt a call

## 2017-09-12 ENCOUNTER — PATIENT MESSAGE (OUTPATIENT)
Dept: ADMINISTRATIVE | Facility: OTHER | Age: 67
End: 2017-09-12

## 2017-09-13 ENCOUNTER — PATIENT MESSAGE (OUTPATIENT)
Dept: ADMINISTRATIVE | Facility: OTHER | Age: 67
End: 2017-09-13

## 2017-09-22 ENCOUNTER — PATIENT OUTREACH (OUTPATIENT)
Dept: OTHER | Facility: OTHER | Age: 67
End: 2017-09-22

## 2017-09-22 ENCOUNTER — OFFICE VISIT (OUTPATIENT)
Dept: INTERNAL MEDICINE | Facility: CLINIC | Age: 67
End: 2017-09-22
Attending: FAMILY MEDICINE
Payer: MEDICARE

## 2017-09-22 VITALS
DIASTOLIC BLOOD PRESSURE: 76 MMHG | HEIGHT: 69 IN | SYSTOLIC BLOOD PRESSURE: 146 MMHG | BODY MASS INDEX: 22 KG/M2 | WEIGHT: 148.5 LBS | HEART RATE: 84 BPM

## 2017-09-22 DIAGNOSIS — C50.412 MALIGNANT NEOPLASM OF UPPER-OUTER QUADRANT OF LEFT FEMALE BREAST, UNSPECIFIED ESTROGEN RECEPTOR STATUS: ICD-10-CM

## 2017-09-22 DIAGNOSIS — I10 HYPERTENSION, ESSENTIAL: Primary | ICD-10-CM

## 2017-09-22 DIAGNOSIS — E78.5 HYPERLIPIDEMIA, UNSPECIFIED HYPERLIPIDEMIA TYPE: ICD-10-CM

## 2017-09-22 PROCEDURE — 1157F ADVNC CARE PLAN IN RCRD: CPT | Mod: ,,, | Performed by: FAMILY MEDICINE

## 2017-09-22 PROCEDURE — 3078F DIAST BP <80 MM HG: CPT | Mod: ,,, | Performed by: FAMILY MEDICINE

## 2017-09-22 PROCEDURE — 99213 OFFICE O/P EST LOW 20 MIN: CPT | Mod: PBBFAC | Performed by: FAMILY MEDICINE

## 2017-09-22 PROCEDURE — 3077F SYST BP >= 140 MM HG: CPT | Mod: ,,, | Performed by: FAMILY MEDICINE

## 2017-09-22 PROCEDURE — 99213 OFFICE O/P EST LOW 20 MIN: CPT | Mod: S$PBB,,, | Performed by: FAMILY MEDICINE

## 2017-09-22 PROCEDURE — 1126F AMNT PAIN NOTED NONE PRSNT: CPT | Mod: ,,, | Performed by: FAMILY MEDICINE

## 2017-09-22 PROCEDURE — 1159F MED LIST DOCD IN RCRD: CPT | Mod: ,,, | Performed by: FAMILY MEDICINE

## 2017-09-22 PROCEDURE — 99999 PR PBB SHADOW E&M-EST. PATIENT-LVL III: CPT | Mod: PBBFAC,,, | Performed by: FAMILY MEDICINE

## 2017-09-22 RX ORDER — AMLODIPINE BESYLATE 10 MG/1
10 TABLET ORAL DAILY
Qty: 90 TABLET | Refills: 1 | Status: SHIPPED | OUTPATIENT
Start: 2017-09-22 | End: 2018-03-18 | Stop reason: SDUPTHER

## 2017-09-22 RX ORDER — EPINEPHRINE 0.3 MG/.3ML
1 INJECTION SUBCUTANEOUS DAILY PRN
Qty: 0.3 ML | Refills: 5 | Status: SHIPPED | OUTPATIENT
Start: 2017-09-22

## 2017-09-22 NOTE — PROGRESS NOTES
Subjective:       Patient ID: Graciela Aranda is a 66 y.o. female.    Chief Complaint: Follow-up    HPI  Review of Systems   Constitutional: Negative for chills, fatigue and fever.   HENT: Positive for congestion. Negative for trouble swallowing.    Eyes: Negative for redness.   Respiratory: Negative for cough, chest tightness and shortness of breath.    Cardiovascular: Negative for chest pain, palpitations and leg swelling.   Gastrointestinal: Negative for abdominal pain and blood in stool.   Genitourinary: Negative for hematuria and menstrual problem.   Musculoskeletal: Negative for arthralgias, back pain, gait problem, joint swelling, myalgias and neck pain.   Skin: Negative for color change and rash.   Neurological: Negative for tremors, speech difficulty, weakness, numbness and headaches.   Hematological: Negative for adenopathy. Does not bruise/bleed easily.   Psychiatric/Behavioral: Negative for behavioral problems, confusion and sleep disturbance. The patient is not nervous/anxious.        Objective:      Physical Exam   Constitutional: She is oriented to person, place, and time. She appears well-developed and well-nourished. No distress.   Neck: Neck supple.   Pulmonary/Chest: Effort normal.   Musculoskeletal: She exhibits no edema.        Right lower leg: She exhibits no edema.        Left lower leg: She exhibits no edema.   Neurological: She is alert and oriented to person, place, and time.   Skin: Skin is warm and dry. No rash noted.   Psychiatric: She has a normal mood and affect. Her behavior is normal. Judgment and thought content normal.   Nursing note and vitals reviewed.      Assessment:       1. Hypertension, essential    2. Hyperlipidemia, unspecified hyperlipidemia type    3. Malignant neoplasm of upper-outer quadrant of left female breast, unspecified estrogen receptor status        Plan:   Graciela was seen today for follow-up.    Diagnoses and all orders for this visit:    Hypertension,  essential    Hyperlipidemia, unspecified hyperlipidemia type    Malignant neoplasm of upper-outer quadrant of left female breast, unspecified estrogen receptor status    Other orders  -     amlodipine (NORVASC) 10 MG tablet; Take 1 tablet (10 mg total) by mouth once daily.  -     epinephrine (EPIPEN) 0.3 mg/0.3 mL AtIn; Inject 0.3 mLs (0.3 mg total) into the muscle daily as needed.      See meds, orders, follow up, routing and instructions sections of encounter.  Dictation #1  MRN:[id number]  [facility]:[id number]

## 2017-09-22 NOTE — TELEPHONE ENCOUNTER
HTN Digital Medicine Program Medication Reconciliation Outreach    Called patient to introduce her into the HDMP. Reviewed program details including blood pressure goals, technique for taking readings (timing, cuff placement, body positioning, i[employer] thomas), and what to do in case of emergency. Introduced patient to members of care team (health , clinician, and responsible provider). Verified patient's understanding of [facility] MyChart thomas use for contacting clinical team and to ensure that iHealth cuff readings continue to transmit by logging in once every 2 weeks. Confirmation text sent and patient received.  Pt uses iPad to take BP readings. Will start with regular readings again after visit with Dr. Patel today.  Pt has been taking Amlodipine and HCTZ in AM,  Lisinopril and Lipitor in PM.  Reports no side effects. No s/s of elevated BP at present. Pt has been dealing with extreme stress at home caring for her bedridden mother. Her brother who also helps out is out of town but is set to return on Monday which will hopefully alleviate some of her stress.    Screening Questionnaire Review:  1. Depression - not indicated  2. Sleep apnea - not indicated         Verified the following information with the patient:  1. Medication list  Current Outpatient Prescriptions on File Prior to Visit   Medication Sig Dispense Refill    amlodipine (NORVASC) 5 MG tablet Take 1 tablet (5 mg total) by mouth once daily. 30 tablet 5    anastrozole (ARIMIDEX) 1 mg Tab Take 1 tablet (1 mg total) by mouth once daily. 30 tablet 5    atorvastatin (LIPITOR) 20 MG tablet Take 1 tablet (20 mg total) by mouth once daily. 30 tablet 5    epinephrine (EPIPEN) 0.3 mg/0.3 mL AtIn Inject 0.3 mLs (0.3 mg total) into the muscle once. 0.3 mL 5    hydrochlorothiazide (HYDRODIURIL) 12.5 MG Tab TAKE 1 TABLET BY MOUTH EVERY DAY 90 tablet 0    ibuprofen (ADVIL,MOTRIN) 600 MG tablet TK 1 T PO Q 8 H  PRN P  0    lisinopril (PRINIVIL,ZESTRIL) 40 MG  tablet TAKE 1 TABLET BY MOUTH DAILY 90 tablet 0    nicotine (NICODERM CQ) 21 mg/24 hr Place 1 patch onto the skin once daily. (Generic preferred. Member of [facility] Smoking Cessation [employer]) 28 patch 0    [DISCONTINUED] nicotine polacrilex (NICORETTE) 4 MG Gum Take 1 each (4 mg total) by mouth as needed (Maximum 15 pieces/day.). (Generic preferred. Member of [facility] Smoking Cessation [employer]) 220 each 0     No current facility-administered medications on file prior to visit.        Previous ADRs    2. Medication compliance: has been compliant with the medicaiton regimen    3. Medication Allergies:   Review of patient's allergies indicates:   Allergen Reactions    Venom-wasp Anaphylaxis     Throat closing up shortly after a wasp sting on 7/1/15       Explained that our goal is to get her BP consistently below 140/90mmHg.  Patient denies having further questions, concerns. BP is not at goal.       Last 5 Patient Entered [place] Current 30 Day Average: 146/76     Recent Readings 9/20/2017 9/18/2017 9/17/2017 9/17/2017 9/16/2017    Systolic BP (mmHg) 150 144 158 163 136    Diastolic BP (mmHg) 70 72 73 82 79    Pulse 84 82 83 83 85

## 2017-09-23 NOTE — PROGRESS NOTES
A 66-year-old established female patient.  She is in for blood pressure   followup.  She is currently enrolled in digital hypertension management.  We   will increase her Norvasc.  Continue hypertensive management.  Diet and exercise   discussed.  Follow up in three months.      ALEXANDRE  dd: 09/22/2017 17:09:38 (CDT)  td: 09/23/2017 06:07:14 (CDT)  Doc ID   #[id number]  Job ID #[id number]    CC:

## 2017-09-25 ENCOUNTER — PATIENT OUTREACH (OUTPATIENT)
Dept: OTHER | Facility: OTHER | Age: 67
End: 2017-09-25

## 2017-09-25 NOTE — PROGRESS NOTES
Last 5 Patient Entered [facility] Current 30 Day Average: 139/74     Recent Readings 9/26/2017 9/26/2017 9/26/2017 9/25/2017 9/25/2017    Systolic BP (mmHg) 120 132 128 113 115    Diastolic BP (mmHg) 67 74 73 65 66    Pulse 90 93 88 72 73        Hypertension Digital Medicine Program (HDMP): Health  Introduction      Introduced Ms. Graciela Aranda to HDMP. Discussed health  role and recommended lifestyle modifications.   Pt has been under stress because of trying to help take care of her 93 year old mother on her own until her brother came back from out of town. BP readings decrease once he got back in town and she started new medicine the same day. Pt uses iPad for BP readings and don't always sync right away.Pt just bought an house in [place], [place] and doesn't have Internet so she isn't able to do reading when she up there visiting.     Diet (i.e. Low sodium, food labels): Pt mention she isn't on a special diet but avoid eating any type of fried food. Pt eats light in the morning such as toast & cereal.  Exercise:Pt reports she hasn't been physically active. Pt reports she has been working around [employer] just a little. Pt reports she had a side affect from chemotherapy called peripheral neuropathy which cause her thumbs and toes to be numb.Pt states she has fallen twice in the past few years and is nervous about being active again. Discussed chair exercises pt can do to keep active.  Pt stated she did sign up for aqua aerobic classes.     Medication Adherence: has been compliant with the medicaiton regimen  Other goals: Pt states she wants to learn more about stress management on a later call.    Reviewed AHA recommendations:  Limit sodium intake to <2000mg/day  Perform 150 minutes of physical activity per week    Patient is aware of the importance of taking daily BP readings at various times of the day  Patient aware that the health  can be used as a resource for lifestyle modifications to help reduce or  maintain a healthy BP  Patient is aware of the importance of medication adherence.  Patient is aware that HDMP team is not available for emergencies. Patient should call 911 or [facility] on Call if one arises.

## 2017-09-28 ENCOUNTER — PATIENT OUTREACH (OUTPATIENT)
Dept: OTHER | Facility: OTHER | Age: 67
End: 2017-09-28

## 2017-09-28 NOTE — PROGRESS NOTES
Last 5 Patient Entered Readings Current 30 Day Average: 139/74     Recent Readings 9/26/2017 9/26/2017 9/26/2017 9/25/2017 9/25/2017    Systolic BP (mmHg) 120 132 128 113 115    Diastolic BP (mmHg) 67 74 73 65 66    Pulse 90 93 88 72 73          HPI:  Introduction and my role as pharmacist was explained to Ms. Graciela Aranda. Patient states that she is feeling well with no questions or concerns at this time. She has noted that her BP readings have decreased and she is very pleased. Denies experiencing any s/s of elevated BP or medication related side effects. States that she has no internet connection at this time and will not be able to take a reading for the next 1 to 2 days.     Assessment/Plan:  1. Average BP currently at goal with readings continuously trending down. Medication regimen adequately providing BP control and no adjustments are needed at this time.   Hypertension Medications             amlodipine (NORVASC) 10 MG tablet Take 1 tablet (10 mg total) by mouth once daily.    hydrochlorothiazide (HYDRODIURIL) 12.5 MG Tab TAKE 1 TABLET BY MOUTH EVERY DAY    lisinopril (PRINIVIL,ZESTRIL) 40 MG tablet TAKE 1 TABLET BY MOUTH DAILY        2. Will continue to monitor. Follow-up scheduled in 4 weeks unless contact is required sooner (s/s or upward trend).

## 2017-10-16 ENCOUNTER — TELEPHONE (OUTPATIENT)
Dept: HEMATOLOGY/ONCOLOGY | Facility: CLINIC | Age: 67
End: 2017-10-16

## 2017-10-16 NOTE — TELEPHONE ENCOUNTER
Returned call to pt.   Informed pt that she is due for 6 mo f/u.   Pt verbalized understanding.         ----- Message from Cayla Cook sent at 10/16/2017  2:55 PM CDT -----  Contact: PT   PT is asking when is the next time she will have to see Dr. Collins, is it every 3 months or every 6 months.     PT doesn't have an issue right now to come in for, she just wants to know.    Callback: [phone]

## 2017-10-23 ENCOUNTER — PATIENT OUTREACH (OUTPATIENT)
Dept: OTHER | Facility: OTHER | Age: 67
End: 2017-10-23
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
## 2017-10-26 ENCOUNTER — PATIENT OUTREACH (OUTPATIENT)
Dept: OTHER | Facility: OTHER | Age: 67
End: 2017-10-26

## 2017-10-26 DIAGNOSIS — I10 HYPERTENSION, ESSENTIAL: Primary | ICD-10-CM

## 2017-10-26 NOTE — PROGRESS NOTES
HPI:   Ms. Aranda is visiting family in [place] and reports that internet service is spotty which may delay readings. She reports medication adherence and has no complaints at this time.     Last 5 Patient Entered Readings Current 30 Day Average: 127/64     Recent Readings 10/24/2017 10/24/2017 10/24/2017 10/24/2017 10/24/2017    Systolic BP (mmHg) 115 119 127 144 150    Diastolic BP (mmHg) 61 62 64 63 71    Pulse 85 84 82 88 87          Assessment:  BP is at goal. Current medication regimen is adequate.    Plan:  Will continue to monitor and follow-up in a few weeks, sooner if needed to assess BP readings and medication regimen.     Current medication regimen:  Hypertension Medications             amlodipine (NORVASC) 10 MG tablet Take 1 tablet (10 mg total) by mouth once daily.    hydrochlorothiazide (HYDRODIURIL) 12.5 MG Tab TAKE 1 TABLET BY MOUTH EVERY DAY    lisinopril (PRINIVIL,ZESTRIL) 40 MG tablet TAKE 1 TABLET BY MOUTH DAILY

## 2017-11-16 ENCOUNTER — INITIAL CONSULT (OUTPATIENT)
Dept: DERMATOLOGY | Facility: CLINIC | Age: 67
End: 2017-11-16
Attending: FAMILY MEDICINE
Payer: MEDICARE

## 2017-11-16 DIAGNOSIS — L57.0 AK (ACTINIC KERATOSIS): Primary | ICD-10-CM

## 2017-11-16 DIAGNOSIS — L82.1 SK (SEBORRHEIC KERATOSIS): ICD-10-CM

## 2017-11-16 PROCEDURE — 99999 PR PBB SHADOW E&M-EST. PATIENT-LVL II: CPT | Mod: PBBFAC,,, | Performed by: DERMATOLOGY

## 2017-11-16 PROCEDURE — 99202 OFFICE O/P NEW SF 15 MIN: CPT | Mod: 25,S$PBB,, | Performed by: DERMATOLOGY

## 2017-11-16 PROCEDURE — 99212 OFFICE O/P EST SF 10 MIN: CPT | Mod: PBBFAC,PO | Performed by: DERMATOLOGY

## 2017-11-16 PROCEDURE — 17000 DESTRUCT PREMALG LESION: CPT | Mod: S$PBB,,, | Performed by: DERMATOLOGY

## 2017-11-16 PROCEDURE — 17000 DESTRUCT PREMALG LESION: CPT | Mod: PBBFAC,PO | Performed by: DERMATOLOGY

## 2017-11-16 NOTE — PROGRESS NOTES
Subjective:       Patient ID:  Graciela Aranda is a 67 y.o. female who presents for   Chief Complaint   Patient presents with    Lesion     Pt c/o lesion on left forearm x a few months. Increased in size. No bleeding or pain. No prev tx.         Review of Systems   Skin: Negative for tendency to form keloidal scars.   Hematologic/Lymphatic: Does not bruise/bleed easily.        Objective:    Physical Exam   Skin:   Areas Examined (abnormalities noted in diagram):   Back Inspection Performed  RUE Inspected  LUE Inspection Performed  Nails and Digits Inspection Performed              Diagram Legend     Erythematous scaling macule/papule c/w actinic keratosis       Vascular papule c/w angioma      Pigmented verrucoid papule/plaque c/w seborrheic keratosis      Yellow umbilicated papule c/w sebaceous hyperplasia      Irregularly shaped tan macule c/w lentigo     1-2 mm smooth white papules consistent with Milia      Movable subcutaneous cyst with punctum c/w epidermal inclusion cyst      Subcutaneous movable cyst c/w pilar cyst      Firm pink to brown papule c/w dermatofibroma      Pedunculated fleshy papule(s) c/w skin tag(s)      Evenly pigmented macule c/w junctional nevus     Mildly variegated pigmented, slightly irregular-bordered macule c/w mildly atypical nevus      Flesh colored to evenly pigmented papule c/w intradermal nevus       Pink pearly papule/plaque c/w basal cell carcinoma      Erythematous hyperkeratotic cursted plaque c/w SCC      Surgical scar with no sign of skin cancer recurrence      Open and closed comedones      Inflammatory papules and pustules      Verrucoid papule consistent consistent with wart     Erythematous eczematous patches and plaques     Dystrophic onycholytic nail with subungual debris c/w onychomycosis     Umbilicated papule    Erythematous-base heme-crusted tan verrucoid plaque consistent with inflamed seborrheic keratosis     Erythematous Silvery Scaling Plaque c/w Psoriasis      See annotation      Assessment / Plan:        AK (actinic keratosis)  Cryosurgery Procedure Note    Verbal consent from the patient is obtained and the patient is aware of the precancerous quality and need for treatment of these lesions. Liquid nitrogen cryosurgery is applied to the 1 actinic keratoses, as detailed in the physical exam, to produce a freeze injury. The patient is aware that blisters may form and is instructed on wound care with gentle cleansing and use of vaseline ointment to keep moist until healed. The patient is supplied a handout on cryosurgery and is instructed to call if lesions do not completely resolve.    SK (seborrheic keratosis)  .These are benign inherited growths without a malignant potential. Reassurance given to patient. No treatment is necessary.              Return in about 1 year (around 11/16/2018).

## 2017-11-20 ENCOUNTER — PATIENT OUTREACH (OUTPATIENT)
Dept: OTHER | Facility: OTHER | Age: 67
End: 2017-11-20

## 2017-11-20 NOTE — PROGRESS NOTES
Last 5 Patient Entered Readings Current 30 Day Average: 126/66     Recent Readings 11/18/2017 11/18/2017 11/18/2017 11/16/2017 11/16/2017    Systolic BP (mmHg) 122 122 133 134 143    Diastolic BP (mmHg) 67 67 69 67 68    Pulse 91 91 92 88 87        Hypertension Digital Medicine Program (HDMP): Health  Follow Up    Lifestyle Modifications:  Pt states she stressed because her mother's caregiver dropped her mother. Pt states her high readings are because of stress and she does de-stress then re-take readings.     1.Low sodium diet: no, Pt states she hasn't had time to eat better.    2.Physical activity: no , Pt states she hasn't had time to workout because she is working with her mother.    3.Hypotension/Hypertension symptoms: no   Frequency/Alleviating factors/Precipitating factors, etc.     4.Patient has been compliant with the medication regimen.     Follow up with Mrs. Graciela Aarnda completed. No further questions or concerns. I will follow up in a few weeks to assess progress.

## 2017-12-04 DIAGNOSIS — I10 ESSENTIAL HYPERTENSION: ICD-10-CM

## 2017-12-04 RX ORDER — LISINOPRIL 40 MG/1
TABLET ORAL
Qty: 90 TABLET | Refills: 0 | Status: SHIPPED | OUTPATIENT
Start: 2017-12-04 | End: 2018-03-02 | Stop reason: SDUPTHER

## 2017-12-12 ENCOUNTER — TELEPHONE (OUTPATIENT)
Dept: HEMATOLOGY/ONCOLOGY | Facility: CLINIC | Age: 67
End: 2017-12-12

## 2017-12-12 NOTE — TELEPHONE ENCOUNTER
Message fwd to .       ----- Message from Jarvis Flores sent at 12/12/2017  1:40 PM CST -----  Contact: Pt   Will like to schedule f/u lab and appt with Dr. Collins in February in the morning     Contact::[phone]

## 2017-12-19 ENCOUNTER — PATIENT OUTREACH (OUTPATIENT)
Dept: OTHER | Facility: OTHER | Age: 67
End: 2017-12-19

## 2017-12-19 NOTE — PROGRESS NOTES
Last 5 Patient Entered Readings Current 30 Day Average: 121/65       Units 12/17/2017 12/17/2017 12/17/2017 12/15/2017 12/15/2017    Time -  7:39 AM  7:37 AM  7:36 AM  2:09 PM  2:09 PM    Systolic Blood Pressure - 136 135 144 139 139    Diastolic Blood Pressure - 78 78 78 66 68    Pulse bpm 85 84 83 84 80          Hypertension Digital Medicine Program (HDMP): Health  Follow Up  Pt reports having mouth surgery at the dentist which caused her BP readings top increase.  Lifestyle Modifications:     1.Low sodium diet: yes, Pt reports being mindful about what she is eating.     2.Physical activity: no. Pt reports not being able to be active because of helping with elderly mother.     3.Hypotension/Hypertension symptoms: no   Frequency/Alleviating factors/Precipitating factors, etc.     4.Patient has been compliant with the medication regimen.     Follow up with Ms. Graciela Aranda completed. No further questions or concerns. I will follow up in a few weeks to assess progress.

## 2017-12-26 ENCOUNTER — PATIENT MESSAGE (OUTPATIENT)
Dept: ADMINISTRATIVE | Facility: OTHER | Age: 67
End: 2017-12-26

## 2017-12-26 RX ORDER — HYDROCHLOROTHIAZIDE 12.5 MG/1
12.5 TABLET ORAL DAILY
Qty: 90 TABLET | Refills: 2 | Status: SHIPPED | OUTPATIENT
Start: 2017-12-26 | End: 2018-09-20 | Stop reason: SDUPTHER

## 2018-01-05 ENCOUNTER — OFFICE VISIT (OUTPATIENT)
Dept: INTERNAL MEDICINE | Facility: CLINIC | Age: 68
End: 2018-01-05
Attending: FAMILY MEDICINE
Payer: MEDICARE

## 2018-01-05 VITALS
BODY MASS INDEX: 22.31 KG/M2 | HEIGHT: 68 IN | SYSTOLIC BLOOD PRESSURE: 139 MMHG | WEIGHT: 147.19 LBS | DIASTOLIC BLOOD PRESSURE: 81 MMHG | TEMPERATURE: 99 F

## 2018-01-05 DIAGNOSIS — C50.412 MALIGNANT NEOPLASM OF UPPER-OUTER QUADRANT OF LEFT FEMALE BREAST, UNSPECIFIED ESTROGEN RECEPTOR STATUS: ICD-10-CM

## 2018-01-05 DIAGNOSIS — E78.5 HYPERLIPIDEMIA, UNSPECIFIED HYPERLIPIDEMIA TYPE: ICD-10-CM

## 2018-01-05 DIAGNOSIS — I10 HYPERTENSION, ESSENTIAL: Primary | ICD-10-CM

## 2018-01-05 DIAGNOSIS — F17.200 SMOKER: ICD-10-CM

## 2018-01-05 DIAGNOSIS — Z90.13 S/P BILATERAL MASTECTOMY: ICD-10-CM

## 2018-01-05 PROCEDURE — 99214 OFFICE O/P EST MOD 30 MIN: CPT | Mod: S$PBB,,, | Performed by: FAMILY MEDICINE

## 2018-01-05 PROCEDURE — 99213 OFFICE O/P EST LOW 20 MIN: CPT | Mod: PBBFAC | Performed by: FAMILY MEDICINE

## 2018-01-05 PROCEDURE — 99999 PR PBB SHADOW E&M-EST. PATIENT-LVL III: CPT | Mod: PBBFAC,,, | Performed by: FAMILY MEDICINE

## 2018-01-05 NOTE — PROGRESS NOTES
Subjective:       Patient ID: Graciela Aranda is a 67 y.o. female.    Chief Complaint: Follow-up    HPI  Review of Systems   Constitutional: Negative for chills, fatigue and fever.   HENT: Negative for congestion and trouble swallowing.    Eyes: Positive for itching. Negative for redness.   Respiratory: Negative for cough, chest tightness and shortness of breath.    Cardiovascular: Negative for chest pain, palpitations and leg swelling.   Gastrointestinal: Negative for abdominal pain and blood in stool.   Genitourinary: Negative for hematuria and menstrual problem.   Musculoskeletal: Negative for arthralgias, back pain, gait problem, joint swelling, myalgias and neck pain.   Skin: Negative for color change and rash.   Neurological: Negative for tremors, speech difficulty, weakness, numbness and headaches.   Hematological: Negative for adenopathy. Does not bruise/bleed easily.   Psychiatric/Behavioral: Negative for behavioral problems, confusion and sleep disturbance. The patient is not nervous/anxious.        Objective:      Physical Exam   Constitutional: She is oriented to person, place, and time. She appears well-developed and well-nourished. No distress.   Neck: Neck supple.   Pulmonary/Chest: Effort normal.   Musculoskeletal: She exhibits no edema.        Right lower leg: She exhibits no edema.        Left lower leg: She exhibits no edema.   Neurological: She is alert and oriented to person, place, and time.   Skin: Skin is warm and dry. No rash noted.   Psychiatric: She has a normal mood and affect. Her behavior is normal. Judgment and thought content normal.   Nursing note and vitals reviewed.      Assessment:       1. Hypertension, essential    2. Hyperlipidemia, unspecified hyperlipidemia type    3. Smoker    4. Malignant neoplasm of upper-outer quadrant of left female breast, unspecified estrogen receptor status    5. S/P bilateral mastectomy        Plan:   Graciela was seen today for follow-up.    Diagnoses  and all orders for this visit:    Hypertension, essential    Hyperlipidemia, unspecified hyperlipidemia type  -     Lipid panel; Future    Smoker    Malignant neoplasm of upper-outer quadrant of left female breast, unspecified estrogen receptor status    S/P bilateral mastectomy      See meds, orders, follow up, routing and instructions sections of encounter.  The patient is in for blood pressure and lipids followup.  She is having no side   effects from her current medication.  I did review her lipids from March and   August.  Her blood pressure was a little bit better today.  She did have some   mild cough and cold symptoms.  She has some labs pending from her   Hematology/Oncology provider.  We will add a lipid panel to that for the next   couple of weeks.  Continue current medications, diet, exercise, etc.      TEE/HN  dd: 01/05/2018 13:24:30 (CST)  td: 01/05/2018 21:53:19 (CST)  Doc ID   #[id number]  Job ID #[id number]    CC:

## 2018-01-11 ENCOUNTER — PATIENT OUTREACH (OUTPATIENT)
Dept: OTHER | Facility: OTHER | Age: 68
End: 2018-01-11

## 2018-01-11 NOTE — PROGRESS NOTES
HPI:  Called patient for follow-up. She is doing well. Notes a few elevated readings during the holidays however, she has resumed her normal eating habits. She reports adherence to her medication regimen with no complaints.     Last 5 Patient Entered Readings Current 30 Day Average: 131/72     Recent Readings 1/10/2018 1/4/2018 1/4/2018 1/4/2018 1/4/2018    SBP (mmHg) 128 120 127 133 143    DBP (mmHg) 77 66 65 55 72    Pulse 80 89 93 89 88        Assessment:  Per 30-day average blood pressure is slightly above goal. Medication regimen is providing adequate coverage. Encouraged patient to utilize LSM to achieve further lowering and maintenance.     Plan:  Will continue to monitor and follow-up in a few weeks  Sooner if needed to assess BP readings and medication regimen.     Current medication regimen:  Hypertension Medications             amlodipine (NORVASC) 10 MG tablet Take 1 tablet (10 mg total) by mouth once daily.    hydroCHLOROthiazide (HYDRODIURIL) 12.5 MG Tab Take 1 tablet (12.5 mg total) by mouth once daily.    lisinopril (PRINIVIL,ZESTRIL) 40 MG tablet TAKE 1 TABLET BY MOUTH DAILY

## 2018-01-16 ENCOUNTER — PATIENT OUTREACH (OUTPATIENT)
Dept: OTHER | Facility: OTHER | Age: 68
End: 2018-01-16

## 2018-01-19 ENCOUNTER — PATIENT OUTREACH (OUTPATIENT)
Dept: OTHER | Facility: OTHER | Age: 68
End: 2018-01-19

## 2018-01-19 DIAGNOSIS — C50.011 MALIGNANT NEOPLASM OF NIPPLE OF RIGHT BREAST IN FEMALE, UNSPECIFIED ESTROGEN RECEPTOR STATUS: Primary | ICD-10-CM

## 2018-01-19 RX ORDER — ANASTROZOLE 1 MG/1
1 TABLET ORAL DAILY
Qty: 30 TABLET | Refills: 5 | Status: SHIPPED | OUTPATIENT
Start: 2018-01-19 | End: 2018-07-16 | Stop reason: SDUPTHER

## 2018-01-19 NOTE — PROGRESS NOTES
Last 5 Patient Entered Readings                                      Current 30 Day Average: 131/70     Recent Readings 1/13/2018 1/13/2018 1/13/2018 1/13/2018 1/13/2018    SBP (mmHg) 142 141 138 150 150    DBP (mmHg) 65 71 73 76 83    Pulse 86 88 90 91 92        Pt called me to let me know that she will be out of Internet for a few weeks and her readings might not come through all the time. Pt reports she will be going to her son's house to use the wifi if she is able.

## 2018-01-23 NOTE — PROGRESS NOTES
Last 5 Patient Entered Readings                                      Current 30 Day Average: 131/70     Recent Readings 1/13/2018 1/13/2018 1/13/2018 1/13/2018 1/13/2018    SBP (mmHg) 142 141 138 150 150    DBP (mmHg) 65 71 73 76 83    Pulse 86 88 90 91 92          Pt called to let me know she does have her cuff and taking her readings. Reports she is getting her Internet fix today to help with that. Report she is trying to get everything fix.

## 2018-02-02 RX ORDER — ATORVASTATIN CALCIUM 20 MG/1
TABLET, FILM COATED ORAL
Qty: 90 TABLET | Refills: 0 | Status: SHIPPED | OUTPATIENT
Start: 2018-02-02 | End: 2018-03-23 | Stop reason: SDUPTHER

## 2018-02-02 RX ORDER — ATORVASTATIN CALCIUM 20 MG/1
TABLET, FILM COATED ORAL
Qty: 30 TABLET | Refills: 0 | Status: SHIPPED | OUTPATIENT
Start: 2018-02-02 | End: 2018-02-02 | Stop reason: SDUPTHER

## 2018-02-08 ENCOUNTER — LAB VISIT (OUTPATIENT)
Dept: LAB | Facility: HOSPITAL | Age: 68
End: 2018-02-08
Attending: INTERNAL MEDICINE
Payer: MEDICARE

## 2018-02-08 ENCOUNTER — OFFICE VISIT (OUTPATIENT)
Dept: HEMATOLOGY/ONCOLOGY | Facility: CLINIC | Age: 68
End: 2018-02-08
Payer: MEDICARE

## 2018-02-08 VITALS
OXYGEN SATURATION: 97 % | WEIGHT: 147.5 LBS | BODY MASS INDEX: 22.43 KG/M2 | TEMPERATURE: 99 F | RESPIRATION RATE: 20 BRPM | SYSTOLIC BLOOD PRESSURE: 156 MMHG | HEART RATE: 93 BPM | DIASTOLIC BLOOD PRESSURE: 70 MMHG

## 2018-02-08 DIAGNOSIS — C50.412 MALIGNANT NEOPLASM OF UPPER-OUTER QUADRANT OF LEFT FEMALE BREAST: ICD-10-CM

## 2018-02-08 DIAGNOSIS — E55.9 VITAMIN D DEFICIENCY: ICD-10-CM

## 2018-02-08 DIAGNOSIS — E78.5 HYPERLIPIDEMIA, UNSPECIFIED HYPERLIPIDEMIA TYPE: ICD-10-CM

## 2018-02-08 DIAGNOSIS — C50.411 MALIGNANT NEOPLASM OF UPPER-OUTER QUADRANT OF RIGHT BREAST IN FEMALE, ESTROGEN RECEPTOR POSITIVE: Primary | ICD-10-CM

## 2018-02-08 DIAGNOSIS — Z17.0 MALIGNANT NEOPLASM OF UPPER-OUTER QUADRANT OF RIGHT BREAST IN FEMALE, ESTROGEN RECEPTOR POSITIVE: Primary | ICD-10-CM

## 2018-02-08 DIAGNOSIS — N17.9 AKI (ACUTE KIDNEY INJURY): ICD-10-CM

## 2018-02-08 LAB
25(OH)D3+25(OH)D2 SERPL-MCNC: 59 NG/ML
ALBUMIN SERPL BCP-MCNC: 4 G/DL
ALP SERPL-CCNC: 87 U/L
ALT SERPL W/O P-5'-P-CCNC: 53 U/L
ANION GAP SERPL CALC-SCNC: 10 MMOL/L
AST SERPL-CCNC: 38 U/L
BASOPHILS # BLD AUTO: 0.05 K/UL
BASOPHILS NFR BLD: 0.6 %
BILIRUB SERPL-MCNC: 0.4 MG/DL
BUN SERPL-MCNC: 30 MG/DL
CALCIUM SERPL-MCNC: 10.3 MG/DL
CHLORIDE SERPL-SCNC: 102 MMOL/L
CHOLEST SERPL-MCNC: 194 MG/DL
CHOLEST/HDLC SERPL: 2.3 {RATIO}
CO2 SERPL-SCNC: 25 MMOL/L
CREAT SERPL-MCNC: 1.7 MG/DL
DIFFERENTIAL METHOD: ABNORMAL
EOSINOPHIL # BLD AUTO: 0.1 K/UL
EOSINOPHIL NFR BLD: 1.8 %
ERYTHROCYTE [DISTWIDTH] IN BLOOD BY AUTOMATED COUNT: 13.5 %
EST. GFR  (AFRICAN AMERICAN): 35.2 ML/MIN/1.73 M^2
EST. GFR  (NON AFRICAN AMERICAN): 30.6 ML/MIN/1.73 M^2
GLUCOSE SERPL-MCNC: 113 MG/DL
HCT VFR BLD AUTO: 40 %
HDLC SERPL-MCNC: 84 MG/DL
HDLC SERPL: 43.3 %
HGB BLD-MCNC: 13.9 G/DL
IMM GRANULOCYTES # BLD AUTO: 0.04 K/UL
IMM GRANULOCYTES NFR BLD AUTO: 0.5 %
LDLC SERPL CALC-MCNC: 93 MG/DL
LYMPHOCYTES # BLD AUTO: 1.6 K/UL
LYMPHOCYTES NFR BLD: 20.4 %
MCH RBC QN AUTO: 34 PG
MCHC RBC AUTO-ENTMCNC: 34.8 G/DL
MCV RBC AUTO: 98 FL
MONOCYTES # BLD AUTO: 0.5 K/UL
MONOCYTES NFR BLD: 6.2 %
NEUTROPHILS # BLD AUTO: 5.5 K/UL
NEUTROPHILS NFR BLD: 70.5 %
NONHDLC SERPL-MCNC: 110 MG/DL
NRBC BLD-RTO: 0 /100 WBC
PLATELET # BLD AUTO: 215 K/UL
PMV BLD AUTO: 10 FL
POTASSIUM SERPL-SCNC: 4.4 MMOL/L
PROT SERPL-MCNC: 7.5 G/DL
RBC # BLD AUTO: 4.09 M/UL
SODIUM SERPL-SCNC: 137 MMOL/L
TRIGL SERPL-MCNC: 85 MG/DL
WBC # BLD AUTO: 7.85 K/UL

## 2018-02-08 PROCEDURE — 1159F MED LIST DOCD IN RCRD: CPT | Mod: ,,, | Performed by: INTERNAL MEDICINE

## 2018-02-08 PROCEDURE — 80061 LIPID PANEL: CPT

## 2018-02-08 PROCEDURE — 80053 COMPREHEN METABOLIC PANEL: CPT

## 2018-02-08 PROCEDURE — 99213 OFFICE O/P EST LOW 20 MIN: CPT | Mod: PBBFAC | Performed by: INTERNAL MEDICINE

## 2018-02-08 PROCEDURE — 99214 OFFICE O/P EST MOD 30 MIN: CPT | Mod: S$PBB,,, | Performed by: INTERNAL MEDICINE

## 2018-02-08 PROCEDURE — 85025 COMPLETE CBC W/AUTO DIFF WBC: CPT

## 2018-02-08 PROCEDURE — 1126F AMNT PAIN NOTED NONE PRSNT: CPT | Mod: ,,, | Performed by: INTERNAL MEDICINE

## 2018-02-08 PROCEDURE — 99999 PR PBB SHADOW E&M-EST. PATIENT-LVL III: CPT | Mod: PBBFAC,,, | Performed by: INTERNAL MEDICINE

## 2018-02-08 PROCEDURE — 36415 COLL VENOUS BLD VENIPUNCTURE: CPT

## 2018-02-08 PROCEDURE — 82306 VITAMIN D 25 HYDROXY: CPT

## 2018-02-08 NOTE — PROGRESS NOTES
Distress Screening Results: Psychosocial Distress screening score of Distress Score: 5 noted and reviewed. No intervention indicated.

## 2018-02-08 NOTE — PROGRESS NOTES
Subjective:       Patient ID: Graciela Aranda is a 67 y.o. female.    Chief Complaint: Follow-up    HPI     Doing well in the interval.  Stressed about her 94 yo mom who is now 80 lbs- now has a wound as is bedridden     Weight stable.  Good energy level- active.  No pain   Tolerating Arimidex well.  No musculoskeletal complaints      Diagnosis:  This is a 67 year old female with T2N1 left breast cancer (ER/IA positive, HER-2 negative) history outlined below and currently on adjuvant Arimidex.  Oncology History:  The patient is a 67-year-old white female with left invasive breast cancer (T2 N1 M0) ER+, IA+, Her2 negative with positive sentinel lymph node from left axilla who completed 4 cycles of AC and 4 cycles of Taxol in the neoadjuvant setting.  Post bilateral mastectomy on 05/29/2015 with Dr. Monte.. Her final pathology revealed:  The left breast had a 2.2 cm of residual invasive ductal carcinoma of overall grade 2 disease. Surgical margins were negative.  The right breast revealed no evidence of invasive carcinoma. +ADH  There were six left sentinel lymph nodes sampled within four sentinel lymph node specimens;  three of these lymph nodes were positive, all of which were noted intraoperatively to be positive. The sizes of the metastatic foci were 3 mm, 2    mm and 3 mm respectively.   She had been consented for the NSABP B-51, an [facility] trial with the positive lymph node intraoperatively. She was randomized to no    axillary lymph node dissection and will be proceeding with adjuvant left axillary radiotherapy.  She was initially scheduled for a left total complete mastectomy without reconstruction and with sentinel lymph node biopsy 11/03/2014. But the preop MRI, revealed a contralateral suspicious right-sided abnormality located at the 3 o'clock position of the right breast. The MRI also revealed a suspicious lymph node in the left axilla. We obtained target ultrasound of both areas, which confirmed the  areas of abnormality in both the right medial breast at the 3 o'clock position as well as a 15 mm left axillary lymph node.She has undergone ultrasound-guided core needle biopsy of the right medial breast mass as well as the left axillary lymph node.   The pathology from right breast mass show fibroadenoma but axillary lymph nodes is positive for involvement with adenocarcinoma ER/CA positive, HER-2/anderson negative tumor.    Her left-sided breast cancer also revealed an ER/CA positive, HER-2/anderson negative tumor that was approximately 5 cm on clinical presentation and on MRI, this mass also measured 41 mm in the middle lower outer region of the left breast located 5 cm from the nipple.       Health maintenance  BMD:  BONE MINERAL DENSITY RESULTS:  Lumbar Spine: Lumbar bone mineral density L1-L4 is 1.190g/cm2, which is a t-score of 1.3. The z-score is 3.2.    Total Hip: The total hip bone mineral density is 0.872g/cm2.  The t-score is -0.6, and the z-score is 0.8.  Femoral neck BMD is 0.770g/cm2 and the t-score is -0.7.    COMPARISONS:  Date Location BMD T-score  10/08/15 L-spine 1.179 1.2  Total Hip 0.873 -0.6   Impression    Elevated BMD of the lumbar spine. No significant change since prior study.      Recommendations:  1) Adequate calcium and Vitamin D therapy  2) Appropriate exercise  3) Consider x-ray of lumbar spine to evaluate elevated Z score of 3.2.  4) Stop smoking  5) Consider repeat BMD in 4 years         Colonoscopy - Had her colonoscopy in the interval which revealed 1 - 3mm polyp that was removed, hyperplastic on pathology. Repeat recommended in 5 years (2021).    Review of Systems   Constitutional: Negative for activity change, appetite change, chills, diaphoresis, fatigue, fever and unexpected weight change.   HENT: Negative for congestion, dental problem, ear pain, hearing loss, mouth sores, nosebleeds, rhinorrhea, sinus pain, sinus pressure, sneezing, sore throat, tinnitus and trouble swallowing.     Eyes: Negative for redness and visual disturbance.   Respiratory: Negative for cough, chest tightness, shortness of breath and wheezing.    Cardiovascular: Negative for chest pain, palpitations and leg swelling.   Gastrointestinal: Negative for abdominal distention, abdominal pain, blood in stool, constipation, diarrhea, nausea and vomiting.   Genitourinary: Negative for decreased urine volume, difficulty urinating, dysuria, frequency, hematuria and urgency.   Musculoskeletal: Negative for arthralgias, back pain, gait problem, joint swelling and neck pain.   Skin: Negative for color change, pallor, rash and wound.   Neurological: Negative for dizziness, weakness, light-headedness, numbness and headaches.   Hematological: Negative for adenopathy. Does not bruise/bleed easily.   Psychiatric/Behavioral: Negative for dysphoric mood and sleep disturbance. The patient is nervous/anxious.        Objective:      Physical Exam   Constitutional: She is oriented to person, place, and time. She appears well-developed and well-nourished. No distress.   Presents alone   HENT:   Head: Normocephalic and atraumatic.   Right Ear: External ear normal.   Left Ear: External ear normal.   Nose: Nose normal.   Mouth/Throat: Oropharynx is clear and moist. No oropharyngeal exudate.   Eyes: Conjunctivae and EOM are normal. Pupils are equal, round, and reactive to light. Right eye exhibits no discharge. Left eye exhibits no discharge. No scleral icterus.   Neck: Normal range of motion. Neck supple. No tracheal deviation present. No thyromegaly present.   Cardiovascular: Normal rate, regular rhythm, normal heart sounds and intact distal pulses.  Exam reveals no gallop and no friction rub.    No murmur heard.  Pulmonary/Chest: Effort normal and breath sounds normal. No respiratory distress. She has no wheezes. She has no rales. She exhibits no tenderness. Right breast exhibits no skin change. Left breast exhibits no mass, no skin change and  no tenderness. Breasts are symmetrical.   Post bilateral mastectomy without evidence of chest wall recurrence  No lymphadenopathy   Abdominal: Soft. Bowel sounds are normal. She exhibits no distension and no mass. There is no tenderness. There is no rebound and no guarding.   No organomegaly   Musculoskeletal: Normal range of motion. She exhibits no edema, tenderness or deformity.   Lymphadenopathy:     She has no cervical adenopathy.   Neurological: She is alert and oriented to person, place, and time. No cranial nerve deficit. She exhibits normal muscle tone. Coordination normal.   Skin: Skin is warm and dry. No rash noted. She is not diaphoretic. No erythema. No pallor.   Psychiatric: She has a normal mood and affect. Her behavior is normal. Judgment and thought content normal.   Nursing note and vitals reviewed.    Labs- reviewe  Assessment:       1. Malignant neoplasm of upper-outer quadrant of right breast in female, estrogen receptor positive    2. Vitamin D deficiency    3. YAMILEX (acute kidney injury)        Plan:     1. ANU clinically  Continue Arimidex  2. Replaced- parameters improved  3. Will message her PCP to follow-up

## 2018-02-09 ENCOUNTER — TELEPHONE (OUTPATIENT)
Dept: INTERNAL MEDICINE | Facility: CLINIC | Age: 68
End: 2018-02-09

## 2018-02-10 NOTE — TELEPHONE ENCOUNTER
----- Message from Silvia Collins MD sent at 2/8/2018 11:17 AM CST -----  Mrs. Aranda had an YAMILEX on labs here today  She is on HCTZ and Lisinopril  I wanted her to discuss follow-up with you

## 2018-02-12 ENCOUNTER — TELEPHONE (OUTPATIENT)
Dept: INTERNAL MEDICINE | Facility: CLINIC | Age: 68
End: 2018-02-12

## 2018-02-12 DIAGNOSIS — R79.9 ABNORMAL BLOOD CHEMISTRY: Primary | ICD-10-CM

## 2018-02-12 NOTE — TELEPHONE ENCOUNTER
Called about labs - will recheck BMP for now and reassess - will call back and adjust meds from there.    She is not taking NSAIDs. BP meds reviewed. May need to stop ACEI.

## 2018-02-15 ENCOUNTER — LAB VISIT (OUTPATIENT)
Dept: LAB | Facility: HOSPITAL | Age: 68
End: 2018-02-15
Attending: FAMILY MEDICINE
Payer: MEDICARE

## 2018-02-15 ENCOUNTER — TELEPHONE (OUTPATIENT)
Dept: INTERNAL MEDICINE | Facility: CLINIC | Age: 68
End: 2018-02-15

## 2018-02-15 DIAGNOSIS — R79.9 ABNORMAL BLOOD CHEMISTRY: ICD-10-CM

## 2018-02-15 LAB
ANION GAP SERPL CALC-SCNC: 9 MMOL/L
BUN SERPL-MCNC: 30 MG/DL
CALCIUM SERPL-MCNC: 10.3 MG/DL
CHLORIDE SERPL-SCNC: 105 MMOL/L
CO2 SERPL-SCNC: 25 MMOL/L
CREAT SERPL-MCNC: 1.2 MG/DL
EST. GFR  (AFRICAN AMERICAN): 54 ML/MIN/1.73 M^2
EST. GFR  (NON AFRICAN AMERICAN): 47 ML/MIN/1.73 M^2
GLUCOSE SERPL-MCNC: 115 MG/DL
POTASSIUM SERPL-SCNC: 4.4 MMOL/L
SODIUM SERPL-SCNC: 139 MMOL/L

## 2018-02-15 PROCEDURE — 80048 BASIC METABOLIC PNL TOTAL CA: CPT

## 2018-02-15 PROCEDURE — 36415 COLL VENOUS BLD VENIPUNCTURE: CPT

## 2018-02-22 ENCOUNTER — TELEPHONE (OUTPATIENT)
Dept: INTERNAL MEDICINE | Facility: CLINIC | Age: 68
End: 2018-02-22

## 2018-02-22 NOTE — TELEPHONE ENCOUNTER
----- Message from Hazel Agrawal sent at 2/22/2018  4:11 PM CST -----  Contact: self/[phone]  Pt called in regards to getting her lab results. She can't get on her computer so she is asking for the office not to send her anything threw my [facility].      Please advise

## 2018-03-02 DIAGNOSIS — I10 ESSENTIAL HYPERTENSION: ICD-10-CM

## 2018-03-02 RX ORDER — LISINOPRIL 40 MG/1
TABLET ORAL
Qty: 90 TABLET | Refills: 0 | Status: SHIPPED | OUTPATIENT
Start: 2018-03-02 | End: 2018-05-22 | Stop reason: SDUPTHER

## 2018-03-14 ENCOUNTER — PATIENT OUTREACH (OUTPATIENT)
Dept: OTHER | Facility: OTHER | Age: 68
End: 2018-03-14

## 2018-03-15 NOTE — PROGRESS NOTES
Last 5 Patient Entered Readings                                      Current 30 Day Average:      Recent Readings 1/13/2018 1/13/2018 1/13/2018 1/13/2018 1/13/2018    SBP (mmHg) 142 141 138 150 150    DBP (mmHg) 65 71 73 76 83    Pulse 86 88 90 91 92          Hypertension Digital Medicine Program (HDMP): Health  Follow Up    Lifestyle Modifications:  Pt reports she is still having wifi problems. Reports her mother just passed last week. Place pt on habitus for a week to giver her time to grieve. Reports the last reading she was about to take was on Feb. 28th and it was 118/64.     Pt reports she is feeling fine.  1.Low sodium diet: no    2.Physical activity: no     3.Hypotension/Hypertension symptoms: no   Frequency/Alleviating factors/Precipitating factors, etc.     4.Patient has been compliant with the medication regimen.     Follow up with Mrs. Graciela Aranda completed. No further questions or concerns. I will follow up in a few weeks to assess progress.

## 2018-03-19 RX ORDER — AMLODIPINE BESYLATE 10 MG/1
TABLET ORAL
Qty: 90 TABLET | Refills: 0 | Status: SHIPPED | OUTPATIENT
Start: 2018-03-19 | End: 2018-06-15 | Stop reason: SDUPTHER

## 2018-03-23 RX ORDER — ATORVASTATIN CALCIUM 20 MG/1
TABLET, FILM COATED ORAL
Qty: 90 TABLET | Refills: 1 | Status: SHIPPED | OUTPATIENT
Start: 2018-03-23 | End: 2018-09-20 | Stop reason: SDUPTHER

## 2018-04-09 ENCOUNTER — PATIENT OUTREACH (OUTPATIENT)
Dept: OTHER | Facility: OTHER | Age: 68
End: 2018-04-09

## 2018-04-09 NOTE — PROGRESS NOTES
HPI:  Called patient for follow-up. Overall she is doing well. Patient's mother  several weeks ago and she is working with her family to finalize certain matters. States that she is frustrated because she may need either a new phone or computer and that is not financially feasible at this time. She continues to monitor BP with a manual cuff and reports readings in the 110's to 130's/80. Patient endorses adherence to medication regime with no complaints and denies s/s of hypertension at this time. Patient wishes to complete introduction to new health  and then be placed on Hiatus.     Assessment:  Unable to assess as patient is unable to take readings at this time.     Plan:  · Continue current regimen.  · HC scheduled to complete introduction next week and place patient on Hiatus as requested.   · Will continue to monitor and follow-up in a few weeks to assess BP readings and medication regimen.     Current medication regimen:  Hypertension Medications             amLODIPine (NORVASC) 10 MG tablet TAKE 1 TABLET(10 MG) BY MOUTH EVERY DAY    hydroCHLOROthiazide (HYDRODIURIL) 12.5 MG Tab Take 1 tablet (12.5 mg total) by mouth once daily.    lisinopril (PRINIVIL,ZESTRIL) 40 MG tablet TAKE 1 TABLET BY MOUTH DAILY

## 2018-04-24 ENCOUNTER — PATIENT OUTREACH (OUTPATIENT)
Dept: OTHER | Facility: OTHER | Age: 68
End: 2018-04-24

## 2018-04-24 NOTE — PROGRESS NOTES
HPI:  Called patient for follow-up. Internet is now working however, her blood pressure cuff is still not working. Will place task a task for tech support. Patient continues to monitor with home cuff and reports readings in the 120's/80's.     Last 5 Patient Entered Readings                                      Current 30 Day Average:      Recent Readings 2/22/2018 2/22/2018 2/17/2018 2/14/2018 1/28/2018    SBP (mmHg) 117 117 127 127 128    DBP (mmHg) 64 66 68 72 71    Pulse 78 76 79 80 79

## 2018-04-26 ENCOUNTER — PATIENT OUTREACH (OUTPATIENT)
Dept: OTHER | Facility: OTHER | Age: 68
End: 2018-04-26

## 2018-04-26 NOTE — PROGRESS NOTES
"Last 5 Patient Entered Readings                                      Current 30 Day Average: 126/74     Recent Readings 4/25/2018 2/22/2018 2/22/2018 2/17/2018 2/14/2018    SBP (mmHg) 126 117 117 127 127    DBP (mmHg) 74 64 66 68 72    Pulse 87 78 76 79 80        Encounter consisted of patient stating she will be out of town for 4 days. She states she will take a reading and then return to readings once she returns.     Encounter also consisted of patient discussing her legal matters revolving around her mother's death a couple of months ago.     Digital Medicine: Health  Follow Up    Lifestyle Modifications:    1.Dietary Modifications (Sodium intake <2,000mg/day, food labels, dining out): Patient reports feeling "hungry a lot." She states consuming water often.     2.Physical Activity: Patient states she tries to walk when she can. Patient reports walking 10 minutes/day. Patient states having some neuropathy in her foot. She states having an issue with getting up and down off the floor due to feeling imbalanced.     3.Medication Therapy: Patient has been compliant with the medication regimen.    4.Patient has the following medication side effects/concerns:   (Frequency/Alleviating factors/Precipitating factors, etc.)     Follow up with Mrs. Graciela Aranda completed. No further questions or concerns. Will continue follow up to achieve health goals.  "

## 2018-05-17 ENCOUNTER — PATIENT OUTREACH (OUTPATIENT)
Dept: OTHER | Facility: OTHER | Age: 68
End: 2018-05-17

## 2018-05-17 ENCOUNTER — CLINICAL SUPPORT (OUTPATIENT)
Dept: SMOKING CESSATION | Facility: CLINIC | Age: 68
End: 2018-05-17
Payer: COMMERCIAL

## 2018-05-17 DIAGNOSIS — F17.200 NICOTINE DEPENDENCE: Primary | ICD-10-CM

## 2018-05-17 PROCEDURE — 99407 BEHAV CHNG SMOKING > 10 MIN: CPT | Mod: S$GLB,,, | Performed by: INTERNAL MEDICINE

## 2018-05-17 NOTE — PROGRESS NOTES
Last 5 Patient Entered Readings                                      Current 30 Day Average: 130/74     Recent Readings 5/16/2018 5/9/2018 5/9/2018 5/9/2018 5/3/2018    SBP (mmHg) 125 137 135 138 130    DBP (mmHg) 77 80 82 78 65    Pulse 95 97 96 99 91        Assessed elevated pulse reading. Patient states she was in the middle of cleaning around the home and might not have waited long enough to relax to take BP reading.     Encounter also consisted of patient discussing her cat's health declining.      Patient reports she will be out of town starting on 5/30 and returning on 6/4. Will call after patient returns from [facility].     Digital Medicine: Health  Follow Up    Lifestyle Modifications:    1.Dietary Modifications (Sodium intake <2,000mg/day, food labels, dining out): Patient reports trying to avoid the fried foods all together. She states having an avocado and beet salad later today.     2.Physical Activity: Patient reports achieving lite exercise by cleaning around and outside of her house. Patient reports having projects to work on. Patient expresses an interest to walk around outside on the patio more. 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 She states trying to walk around 10-15 minutes. Created [facility]. Encouraged patient to walk as much as she is able.     3.Medication Therapy: Patient has been compliant with the medication regimen.    4.Patient has the following medication side effects/concerns:   (Frequency/Alleviating factors/Precipitating factors, etc.)     Follow up with Ms. Graciela Aranda completed. No further questions or concerns. Will continue follow up to achieve health goals.

## 2018-05-17 NOTE — PROGRESS NOTES
Spoke with patient today in regard to smoking cessation progress for 3/6 month follow up, she states not tobacco free. Patient states she has to check her schedule before she can schedule an appointment. Patient was informed of clinic in [facility]. Informed patient of benefit period, a follow up at 1 year, and contact information. Will complete smart form for 3/6 month follow up on Quit attempt #3.

## 2018-05-22 ENCOUNTER — PATIENT OUTREACH (OUTPATIENT)
Dept: OTHER | Facility: OTHER | Age: 68
End: 2018-05-22

## 2018-05-22 DIAGNOSIS — I10 ESSENTIAL HYPERTENSION: ICD-10-CM

## 2018-05-22 RX ORDER — LISINOPRIL 40 MG/1
40 TABLET ORAL DAILY
Qty: 90 TABLET | Refills: 0 | Status: SHIPPED | OUTPATIENT
Start: 2018-05-22 | End: 2018-08-19 | Stop reason: SDUPTHER

## 2018-05-22 NOTE — PROGRESS NOTES
HPI:  Called patient for follow-up. States that she is doing well. Endorses adherence to regimen with no complaints.    Last 5 Patient Entered Readings                                      Current 30 Day Average: 130/74     Recent Readings 5/16/2018 5/9/2018 5/9/2018 5/9/2018 5/3/2018    SBP (mmHg) 125 137 135 138 130    DBP (mmHg) 77 80 82 78 65    Pulse 95 97 96 99 91        Assessment:  Per 30-day average blood pressure is stable and right at goal.     Plan:   Continue current regimen. Refill sent for lisinopril 40 mg once daily.   Will continue to monitor and follow-up in a few weeks,sooner if needed to assess BP readings and medication regimen.     Current medication regimen:  Hypertension Medications             amLODIPine (NORVASC) 10 MG tablet TAKE 1 TABLET(10 MG) BY MOUTH EVERY DAY    hydroCHLOROthiazide (HYDRODIURIL) 12.5 MG Tab Take 1 tablet (12.5 mg total) by mouth once daily.    lisinopril (PRINIVIL,ZESTRIL) 40 MG tablet TAKE 1 TABLET BY MOUTH DAILY

## 2018-05-29 DIAGNOSIS — I10 ESSENTIAL HYPERTENSION: ICD-10-CM

## 2018-05-29 RX ORDER — LISINOPRIL 40 MG/1
TABLET ORAL
Qty: 90 TABLET | Refills: 0 | Status: SHIPPED | OUTPATIENT
Start: 2018-05-29 | End: 2018-07-13 | Stop reason: SDUPTHER

## 2018-06-07 ENCOUNTER — PATIENT OUTREACH (OUTPATIENT)
Dept: OTHER | Facility: OTHER | Age: 68
End: 2018-06-07

## 2018-06-07 NOTE — PROGRESS NOTES
"Last 5 Patient Entered Readings                                      Current 30 Day Average: 123/68     Recent Readings 6/6/2018 6/6/2018 6/1/2018 5/23/2018 5/23/2018    SBP (mmHg) 111 115 110 134 131    DBP (mmHg) 59 66 59 67 71    Pulse 90 90 75 85 87        Patient states she has not been waiting the appropriate amount of time to take BP reading. Reminded and encouraged patient to adhere to proper BP timing (i.e. Waiting at least 45 minutes to take BP reading).     Encounter also consisted of patient expressing having anxiety when taking her readings. She states feeling like the "blood pressure police" are watching her. She states trying to take her time and relax before taking readings but does not always work. Reminded patient her [facility] care team are here to help assist patient and do not want her to feel uncomfortable. She requested HC to call 1x/month. Will call 1x/month moving forward.     Patient reports she may visit [place] often. She states she may travel to [place] in the next couple weeks.     Encounter also consisted of patient discussing recent death of pet and family acquiring pets post-Wendi.    Digital Medicine: Health  Follow Up    Lifestyle Modifications:    1.Dietary Modifications (Sodium intake <2,000mg/day, food labels, dining out): Patient states increasing her water intake. She states its mostly due to the hot temperature. She states consuming a hamburger liliana for lunch. She states consuming less fried foods. She states consuming a baked potato with hamburger with a little butter and no added salt. She states treating herself by eating out yesterday. She states wanting focus on diet.     2.Physical Activity: Patient reports she is walking more but expresses the hot weather is difficult to maintain outside exercise. She states trying to walk around 10 minutes but is not the best.       3.Medication Therapy: Patient has been compliant with the medication regimen.    4.Patient has " the following medication side effects/concerns:   (Frequency/Alleviating factors/Precipitating factors, etc.)     Follow up with Ms. Graciela Aranda completed. No further questions or concerns. Will continue follow up to achieve health goals.

## 2018-06-07 NOTE — PROGRESS NOTES
Last 5 Patient Entered Readings                                      Current 30 Day Average: 123/68     Recent Readings 6/6/2018 6/6/2018 6/1/2018 5/23/2018 5/23/2018    SBP (mmHg) 111 115 110 134 131    DBP (mmHg) 59 66 59 67 71    Pulse 90 90 75 85 87          Digital Medicine: Health  Follow Up    Left voicemail to follow up with Mrs. Graciela Aranda.  Current BP average 123/68 mmHg is at goal, [130/80]. BP is well-controlled.

## 2018-06-15 RX ORDER — AMLODIPINE BESYLATE 10 MG/1
TABLET ORAL
Qty: 90 TABLET | Refills: 0 | Status: SHIPPED | OUTPATIENT
Start: 2018-06-15 | End: 2018-10-23 | Stop reason: SDUPTHER

## 2018-07-09 ENCOUNTER — PATIENT OUTREACH (OUTPATIENT)
Dept: OTHER | Facility: OTHER | Age: 68
End: 2018-07-09

## 2018-07-09 NOTE — PROGRESS NOTES
Last 5 Patient Entered Readings                                      Current 30 Day Average: 121/64     Recent Readings 7/3/2018 6/26/2018 6/26/2018 6/19/2018 6/19/2018    SBP (mmHg) 130 124 133 108 117    DBP (mmHg) 72 71 76 48 61    Pulse 88 86 90 86 87        Brief encounter. Patient answered stating she was preoccupied with friends in [place] but wanted to inform HC she has been exercising more.     Digital Medicine: Health  Follow Up    Lifestyle Modifications:    1.Dietary Modifications (Sodium intake <2,000mg/day, food labels, dining out): Deferred    2.Physical Activity: Patient reports swimming 45 minutes, 3x/week. She states enjoying swimming and feels great after her exercise. Encouraged patient to continue her swimming.        3.Medication Therapy: Patient has been compliant with the medication regimen.    4.Patient has the following medication side effects/concerns:   (Frequency/Alleviating factors/Precipitating factors, etc.)     Follow up with Ms. Gracielastalin Aranda completed. No further questions or concerns. Will continue follow up to achieve health goals.

## 2018-07-13 ENCOUNTER — OFFICE VISIT (OUTPATIENT)
Dept: INTERNAL MEDICINE | Facility: CLINIC | Age: 68
End: 2018-07-13
Attending: FAMILY MEDICINE
Payer: MEDICARE

## 2018-07-13 VITALS
HEART RATE: 92 BPM | HEIGHT: 69 IN | OXYGEN SATURATION: 98 % | WEIGHT: 151.88 LBS | BODY MASS INDEX: 22.49 KG/M2 | SYSTOLIC BLOOD PRESSURE: 136 MMHG | DIASTOLIC BLOOD PRESSURE: 50 MMHG

## 2018-07-13 DIAGNOSIS — Z90.13 S/P BILATERAL MASTECTOMY: ICD-10-CM

## 2018-07-13 DIAGNOSIS — F17.200 SMOKER: ICD-10-CM

## 2018-07-13 DIAGNOSIS — E78.5 HYPERLIPIDEMIA, UNSPECIFIED HYPERLIPIDEMIA TYPE: ICD-10-CM

## 2018-07-13 DIAGNOSIS — Z17.0 MALIGNANT NEOPLASM OF UPPER-OUTER QUADRANT OF RIGHT BREAST IN FEMALE, ESTROGEN RECEPTOR POSITIVE: ICD-10-CM

## 2018-07-13 DIAGNOSIS — I10 HYPERTENSION, ESSENTIAL: Primary | ICD-10-CM

## 2018-07-13 DIAGNOSIS — C50.411 MALIGNANT NEOPLASM OF UPPER-OUTER QUADRANT OF RIGHT BREAST IN FEMALE, ESTROGEN RECEPTOR POSITIVE: ICD-10-CM

## 2018-07-13 DIAGNOSIS — M85.80 OSTEOPENIA, UNSPECIFIED LOCATION: ICD-10-CM

## 2018-07-13 PROBLEM — N17.9 AKI (ACUTE KIDNEY INJURY): Status: RESOLVED | Noted: 2018-02-08 | Resolved: 2018-07-13

## 2018-07-13 PROCEDURE — 99214 OFFICE O/P EST MOD 30 MIN: CPT | Mod: S$PBB,,, | Performed by: FAMILY MEDICINE

## 2018-07-13 PROCEDURE — 99999 PR PBB SHADOW E&M-EST. PATIENT-LVL III: CPT | Mod: PBBFAC,,, | Performed by: FAMILY MEDICINE

## 2018-07-13 PROCEDURE — 99213 OFFICE O/P EST LOW 20 MIN: CPT | Mod: PBBFAC | Performed by: FAMILY MEDICINE

## 2018-07-13 NOTE — PROGRESS NOTES
Subjective:       Patient ID: Graciela Aranda is a 67 y.o. female.    Chief Complaint: Follow-up    HPI  Review of Systems   Constitutional: Negative for chills, fatigue, fever and unexpected weight change.   HENT: Negative for congestion and trouble swallowing.    Eyes: Negative for redness and visual disturbance.   Respiratory: Negative for cough, chest tightness and shortness of breath.    Cardiovascular: Negative for chest pain, palpitations and leg swelling.   Gastrointestinal: Negative for abdominal pain and blood in stool.   Genitourinary: Negative for difficulty urinating, hematuria and menstrual problem.   Musculoskeletal: Negative for arthralgias, back pain, gait problem, joint swelling, myalgias and neck pain.   Skin: Negative for color change and rash.   Neurological: Negative for tremors, speech difficulty, weakness, numbness and headaches.   Hematological: Negative for adenopathy. Does not bruise/bleed easily.   Psychiatric/Behavioral: Negative for behavioral problems, confusion and sleep disturbance. The patient is not nervous/anxious.        Objective:      Physical Exam   Constitutional: She is oriented to person, place, and time. She appears well-developed and well-nourished.   Eyes: No scleral icterus.   Neck: Normal range of motion. Neck supple. No JVD present. No tracheal deviation present. No thyromegaly present.   Cardiovascular: Normal rate, normal heart sounds and intact distal pulses.  Exam reveals no gallop and no friction rub.    No murmur heard.  Pulmonary/Chest: Effort normal and breath sounds normal. No respiratory distress. She has no wheezes. She has no rales.   Abdominal: Soft. Bowel sounds are normal. She exhibits no distension, no abdominal bruit and no mass. There is no tenderness. There is no rebound and no guarding.   Musculoskeletal: She exhibits no edema.   Lymphadenopathy:     She has no cervical adenopathy.   Neurological: She is alert and oriented to person, place, and time.  She displays no tremor. No cranial nerve deficit. Coordination and gait normal.   Skin: Skin is warm and dry. No rash noted. She is not diaphoretic. No erythema.   Psychiatric: She has a normal mood and affect. Her behavior is normal. Judgment and thought content normal.   Nursing note and vitals reviewed.      Assessment:       1. Hypertension, essential    2. Hyperlipidemia, unspecified hyperlipidemia type    3. Malignant neoplasm of upper-outer quadrant of right breast in female, estrogen receptor positive    4. S/P bilateral mastectomy    5. Osteopenia, unspecified location    6. Smoker        Plan:   Graciela was seen today for follow-up.    Diagnoses and all orders for this visit:    Hypertension, essential    Hyperlipidemia, unspecified hyperlipidemia type  -     Lipid panel; Future    Malignant neoplasm of upper-outer quadrant of right breast in female, estrogen receptor positive    S/P bilateral mastectomy    Osteopenia, unspecified location    Smoker  Comments:  declines cessation and screen CT        See meds, orders, follow up, routing and instructions sections of encounter.  HISTORY OF PRESENT ILLNESS:  This is a 67-year-old patient who is here for a   six-month followup and/or physical examination.  Her mom  in 2018.    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  She is having no complaints today.  I did review her   Hematology/Oncology notes.  She had left breast cancer in 2015 with bilateral   mastectomies, chemotherapy and radiation.  She is still under the routine care.    She had a CKD at one point with a creatinine of 1.8, which subsequently reduced   to a creatinine of 1.2 and she is stable on her current medications with no side   effects.  We did discuss statins last year.    RECOMMENDATIONS:  Recheck laboratory with her upcoming Hematology lab work and   follow up with me in approximately six months.  Continue exercise program.    The  patient declined an offer for a screening CT scan of the chest based on her   greater than 30-year smoking history.      TEE/HN  dd: 07/13/2018 13:08:23 (CDT)  td: 07/14/2018 00:01:48 (CDT)  Doc ID   #[id number]  Job ID #[id number]    CC:

## 2018-07-16 DIAGNOSIS — C50.011 MALIGNANT NEOPLASM OF NIPPLE OF RIGHT BREAST IN FEMALE, UNSPECIFIED ESTROGEN RECEPTOR STATUS: ICD-10-CM

## 2018-07-16 RX ORDER — ANASTROZOLE 1 MG/1
TABLET ORAL
Qty: 30 TABLET | Refills: 11 | Status: SHIPPED | OUTPATIENT
Start: 2018-07-16 | End: 2019-02-20 | Stop reason: SDUPTHER

## 2018-07-19 ENCOUNTER — CLINICAL SUPPORT (OUTPATIENT)
Dept: SMOKING CESSATION | Facility: CLINIC | Age: 68
End: 2018-07-19
Payer: COMMERCIAL

## 2018-07-19 DIAGNOSIS — F17.200 NICOTINE DEPENDENCE: Primary | ICD-10-CM

## 2018-07-19 PROCEDURE — 99407 BEHAV CHNG SMOKING > 10 MIN: CPT | Mod: S$GLB,,, | Performed by: INTERNAL MEDICINE

## 2018-07-19 NOTE — PROGRESS NOTES
Spoke with patient today in regard to smoking cessation progress for 12 month follow up, she states not tobacco free. Patient not ready to schedule an appointment at this time and would like to call back at a later date. Informed patient of benefit period and contact information if any further help or support is needed. Will resolve episode and complete smart form for Quit attempt #3.

## 2018-08-01 ENCOUNTER — TELEPHONE (OUTPATIENT)
Dept: SMOKING CESSATION | Facility: CLINIC | Age: 68
End: 2018-08-01

## 2018-08-01 NOTE — TELEPHONE ENCOUNTER
Patient called to schedule an appointment to return to the program at the [facility] for Quit attempt #4.

## 2018-08-06 ENCOUNTER — CLINICAL SUPPORT (OUTPATIENT)
Dept: SMOKING CESSATION | Facility: CLINIC | Age: 68
End: 2018-08-06
Payer: COMMERCIAL

## 2018-08-06 ENCOUNTER — PATIENT OUTREACH (OUTPATIENT)
Dept: OTHER | Facility: OTHER | Age: 68
End: 2018-08-06

## 2018-08-06 DIAGNOSIS — F17.200 NICOTINE DEPENDENCE: Primary | ICD-10-CM

## 2018-08-06 PROCEDURE — 99404 PREV MED CNSL INDIV APPRX 60: CPT | Mod: S$GLB,,, | Performed by: FAMILY MEDICINE

## 2018-08-06 RX ORDER — IBUPROFEN 200 MG
1 TABLET ORAL DAILY
Qty: 28 PATCH | Refills: 0 | Status: SHIPPED | OUTPATIENT
Start: 2018-08-06 | End: 2018-08-07 | Stop reason: SDUPTHER

## 2018-08-06 RX ORDER — DM/P-EPHED/ACETAMINOPH/DOXYLAM 30-7.5/3
2 LIQUID (ML) ORAL
Qty: 72 LOZENGE | Refills: 0 | Status: SHIPPED | OUTPATIENT
Start: 2018-08-06 | End: 2018-09-05

## 2018-08-06 NOTE — Clinical Note
Pt seen at intake today. She currently smokes 20 cigs/day. Discussed tobacco cessation medication of 21 mg nicotine patch QD and 2 mg nicotine lozenge PRN (1-2 per hour in place of cigarettes). Pt started on rate reduction and wait time of 15 min prior to smoking. Exhaled carbon monoxide level was 18 (0-6 non-smoker). Will see pt back in office in 1 wk.

## 2018-08-06 NOTE — PROGRESS NOTES
Last 5 Patient Entered Readings                                      Current 30 Day Average: 123/67     Recent Readings 8/3/2018 7/27/2018 7/21/2018 7/13/2018 7/13/2018    SBP (mmHg) 125 132 122 112 116    DBP (mmHg) 69 72 70 57 61    Pulse 86 88 81 84 83            Digital Medicine: Health  Follow Up    Left voicemail to follow up with Mrs. Graciela Aranda.  Current BP average 123/67 mmHg is at goal, [130/80].

## 2018-08-07 ENCOUNTER — OFFICE VISIT (OUTPATIENT)
Dept: HEMATOLOGY/ONCOLOGY | Facility: CLINIC | Age: 68
End: 2018-08-07
Payer: MEDICARE

## 2018-08-07 ENCOUNTER — LAB VISIT (OUTPATIENT)
Dept: LAB | Facility: HOSPITAL | Age: 68
End: 2018-08-07
Attending: INTERNAL MEDICINE
Payer: MEDICARE

## 2018-08-07 VITALS
SYSTOLIC BLOOD PRESSURE: 146 MMHG | WEIGHT: 149.69 LBS | TEMPERATURE: 99 F | HEIGHT: 69 IN | DIASTOLIC BLOOD PRESSURE: 69 MMHG | OXYGEN SATURATION: 98 % | BODY MASS INDEX: 22.17 KG/M2 | HEART RATE: 88 BPM | RESPIRATION RATE: 16 BRPM

## 2018-08-07 DIAGNOSIS — E83.52 HYPERCALCEMIA: ICD-10-CM

## 2018-08-07 DIAGNOSIS — C50.411 MALIGNANT NEOPLASM OF UPPER-OUTER QUADRANT OF RIGHT BREAST IN FEMALE, ESTROGEN RECEPTOR POSITIVE: ICD-10-CM

## 2018-08-07 DIAGNOSIS — E78.5 HYPERLIPIDEMIA, UNSPECIFIED HYPERLIPIDEMIA TYPE: ICD-10-CM

## 2018-08-07 DIAGNOSIS — Z17.0 MALIGNANT NEOPLASM OF UPPER-OUTER QUADRANT OF RIGHT BREAST IN FEMALE, ESTROGEN RECEPTOR POSITIVE: ICD-10-CM

## 2018-08-07 DIAGNOSIS — C50.411 MALIGNANT NEOPLASM OF UPPER-OUTER QUADRANT OF RIGHT BREAST IN FEMALE, ESTROGEN RECEPTOR POSITIVE: Primary | ICD-10-CM

## 2018-08-07 DIAGNOSIS — Z17.0 MALIGNANT NEOPLASM OF UPPER-OUTER QUADRANT OF RIGHT BREAST IN FEMALE, ESTROGEN RECEPTOR POSITIVE: Primary | ICD-10-CM

## 2018-08-07 LAB
ALBUMIN SERPL BCP-MCNC: 4.4 G/DL
ALP SERPL-CCNC: 86 U/L
ALT SERPL W/O P-5'-P-CCNC: 29 U/L
ANION GAP SERPL CALC-SCNC: 9 MMOL/L
AST SERPL-CCNC: 22 U/L
BILIRUB SERPL-MCNC: 0.7 MG/DL
BUN SERPL-MCNC: 24 MG/DL
CALCIUM SERPL-MCNC: 10.7 MG/DL
CHLORIDE SERPL-SCNC: 103 MMOL/L
CHOLEST SERPL-MCNC: 181 MG/DL
CHOLEST/HDLC SERPL: 2.3 {RATIO}
CO2 SERPL-SCNC: 26 MMOL/L
CREAT SERPL-MCNC: 1.2 MG/DL
ERYTHROCYTE [DISTWIDTH] IN BLOOD BY AUTOMATED COUNT: 14 %
EST. GFR  (AFRICAN AMERICAN): 53.7 ML/MIN/1.73 M^2
EST. GFR  (NON AFRICAN AMERICAN): 46.5 ML/MIN/1.73 M^2
GLUCOSE SERPL-MCNC: 123 MG/DL
HCT VFR BLD AUTO: 39.8 %
HDLC SERPL-MCNC: 78 MG/DL
HDLC SERPL: 43.1 %
HGB BLD-MCNC: 13.3 G/DL
IMM GRANULOCYTES # BLD AUTO: 0.03 K/UL
LDLC SERPL CALC-MCNC: 80.2 MG/DL
MCH RBC QN AUTO: 33.7 PG
MCHC RBC AUTO-ENTMCNC: 33.4 G/DL
MCV RBC AUTO: 101 FL
NEUTROPHILS # BLD AUTO: 5.1 K/UL
NONHDLC SERPL-MCNC: 103 MG/DL
PLATELET # BLD AUTO: 207 K/UL
PMV BLD AUTO: 10 FL
POTASSIUM SERPL-SCNC: 4.2 MMOL/L
PROT SERPL-MCNC: 7.4 G/DL
RBC # BLD AUTO: 3.95 M/UL
SODIUM SERPL-SCNC: 138 MMOL/L
TRIGL SERPL-MCNC: 114 MG/DL
WBC # BLD AUTO: 7.11 K/UL

## 2018-08-07 PROCEDURE — 99213 OFFICE O/P EST LOW 20 MIN: CPT | Mod: S$PBB,,, | Performed by: PHYSICIAN ASSISTANT

## 2018-08-07 PROCEDURE — 85027 COMPLETE CBC AUTOMATED: CPT

## 2018-08-07 PROCEDURE — 36415 COLL VENOUS BLD VENIPUNCTURE: CPT

## 2018-08-07 PROCEDURE — 99213 OFFICE O/P EST LOW 20 MIN: CPT | Mod: PBBFAC | Performed by: PHYSICIAN ASSISTANT

## 2018-08-07 PROCEDURE — 80053 COMPREHEN METABOLIC PANEL: CPT

## 2018-08-07 PROCEDURE — 80061 LIPID PANEL: CPT

## 2018-08-07 PROCEDURE — 99999 PR PBB SHADOW E&M-EST. PATIENT-LVL III: CPT | Mod: PBBFAC,,, | Performed by: PHYSICIAN ASSISTANT

## 2018-08-07 NOTE — PROGRESS NOTES
Subjective:       Patient ID: Graciela Aranda is a 67 y.o. female.    Chief Complaint: Malignant neoplasm of upper-outer quadrant of right breast i    HPI  Review of Systems    Objective:      Physical Exam    Assessment:       1. Malignant neoplasm of upper-outer quadrant of right breast in female, estrogen receptor positive    2. Hypercalcemia        Plan:       ***

## 2018-08-07 NOTE — PROGRESS NOTES
Last 5 Patient Entered Readings                                      Current 30 Day Average: 123/67     Recent Readings 8/3/2018 7/27/2018 7/21/2018 7/13/2018 7/13/2018    SBP (mmHg) 125 132 122 112 116    DBP (mmHg) 69 72 70 57 61    Pulse 86 88 81 84 83        Brief encounter. Patient reports driving but insisted on taking brief follow up call from HC.     Patient also discussed her D[facility] appointments.     Digital Medicine: Health  Follow Up    Lifestyle Modifications:    1.Dietary Modifications (Sodium intake <2,000mg/day, food labels, dining out): Patient reports trying to consume a lot of water. Patient states diet is about the same.      2.Physical Activity: Patient reports she is swimming 2-3x week for about a week. She states swimming 1 1/2 hour. Encouraged patient to continue swimming when she is able.       3.Medication Therapy: Patient has been compliant with the medication regimen.    4.Patient has the following medication side effects/concerns:   (Frequency/Alleviating factors/Precipitating factors, etc.)     Follow up with Mrs. Graciela Aranda completed. No further questions or concerns. Will continue follow up to achieve health goals.

## 2018-08-07 NOTE — PROGRESS NOTES
Subjective:       Patient ID: Graciela Aranda is a 67 y.o. female.    Chief Complaint: Malignant neoplasm of upper-outer quadrant of right breast i    67 year old femalew ith T2N1 ER/VA + left breast cancer, currently on adjuvant arimidex, here for follow up.      Weight stable.  Good energy level- active with grandchildren.  Trying to hydrate as much as possible.   No pain   Tolerating Arimidex well.  No musculoskeletal complaints  She started smoking cessation program yesterday, has patch on.  She is not taking calcium supplement due to past hypercalcemia but remains on Vitamin D.      Diagnosis:  This is a 67 year old female with T2N1 left breast cancer (ER/VA positive, HER-2 negative) history outlined below and currently on adjuvant Arimidex.  Oncology History:  The patient is a 67-year-old white female with left invasive breast cancer (T2 N1 M0) ER+, VA+, Her2 negative with positive sentinel lymph node from left axilla who completed 4 cycles of AC and 4 cycles of Taxol in the neoadjuvant setting.  Post bilateral mastectomy on 05/29/2015 with Dr. Monte.. Her final pathology revealed:  The left breast had a 2.2 cm of residual invasive ductal carcinoma of overall grade 2 disease. Surgical margins were negative.  The right breast revealed no evidence of invasive carcinoma. +ADH  There were six left sentinel lymph nodes sampled within four sentinel lymph node specimens;  three of these lymph nodes were positive, all of which were noted intraoperatively to be positive. The sizes of the metastatic foci were 3 mm, 2    mm and 3 mm respectively.   She had been consented for the NSABP B-51, an [place] trial with the positive lymph node intraoperatively. She was randomized to no    axillary lymph node dissection and will be proceeding with adjuvant left axillary radiotherapy.  She was initially scheduled for a left total complete mastectomy without reconstruction and with sentinel lymph node biopsy 11/03/2014. But the  preop MRI, revealed a contralateral suspicious right-sided abnormality located at the 3 o'clock position of the right breast. The MRI also revealed a suspicious lymph node in the left axilla. We obtained target ultrasound of both areas, which confirmed the areas of abnormality in both the right medial breast at the 3 o'clock position as well as a 15 mm left axillary lymph node.She has undergone ultrasound-guided core needle biopsy of the right medial breast mass as well as the left axillary lymph node.   The pathology from right breast mass show fibroadenoma but axillary lymph nodes is positive for involvement with adenocarcinoma ER/UT positive, HER-2/anderson negative tumor.    Her left-sided breast cancer also revealed an ER/UT positive, HER-2/anderson negative tumor that was approximately 5 cm on clinical presentation and on MRI, this mass also measured 41 mm in the middle lower outer region of the left breast located 5 cm from the nipple.       Health maintenance  BMD:  BONE MINERAL DENSITY RESULTS:  Lumbar Spine: Lumbar bone mineral density L1-L4 is 1.190g/cm2, which is a t-score of 1.3. The z-score is 3.2.    Total Hip: The total hip bone mineral density is 0.872g/cm2.  The t-score is -0.6, and the z-score is 0.8.  Femoral neck BMD is 0.770g/cm2 and the t-score is -0.7.    COMPARISONS:  Date Location BMD T-score  10/08/15 L-spine 1.179 1.2  Total Hip 0.873 -0.6  Impression    Elevated BMD of the lumbar spine. No significant change since prior study.      Recommendations:  1) Adequate calcium and Vitamin D therapy  2) Appropriate exercise  3) Consider x-ray of lumbar spine to evaluate elevated Z score of 3.2.  4) Stop smoking  5) Consider repeat BMD in 4 years        Colonoscopy - Had her colonoscopy in the interval which revealed 1 - 3mm polyp that was removed, hyperplastic on pathology. Repeat recommended in 5 years (2021).           Review of Systems   Constitutional: Negative for activity change, appetite change,  chills, diaphoresis, fatigue, fever and unexpected weight change.   HENT: Negative for congestion, dental problem, mouth sores, rhinorrhea, sinus pain, sinus pressure, sneezing, sore throat and trouble swallowing.    Eyes: Negative for redness and visual disturbance.   Respiratory: Negative for cough, chest tightness, shortness of breath and wheezing.    Cardiovascular: Negative for chest pain, palpitations and leg swelling.   Gastrointestinal: Negative for abdominal distention, abdominal pain, blood in stool, constipation, diarrhea, nausea and vomiting.   Genitourinary: Negative for decreased urine volume, difficulty urinating, dysuria, frequency, hematuria and urgency.   Musculoskeletal: Negative for arthralgias, back pain, gait problem, joint swelling and neck pain.   Skin: Negative for color change, pallor, rash and wound.   Neurological: Negative for dizziness, weakness, light-headedness, numbness and headaches.   Hematological: Negative for adenopathy. Does not bruise/bleed easily.   Psychiatric/Behavioral: Negative for dysphoric mood and sleep disturbance. The patient is nervous/anxious.        Objective:      Physical Exam   Constitutional: She is oriented to person, place, and time. She appears well-developed and well-nourished. No distress.   Presents alone  ECOG=0     HENT:   Head: Normocephalic and atraumatic.   Nose: Nose normal.   Mouth/Throat: Oropharynx is clear and moist. No oropharyngeal exudate.   Eyes: Conjunctivae and EOM are normal. Pupils are equal, round, and reactive to light. Right eye exhibits no discharge. Left eye exhibits no discharge. No scleral icterus.   Neck: Normal range of motion. Neck supple. No thyromegaly present.   Cardiovascular: Normal rate, regular rhythm, normal heart sounds and intact distal pulses.  Exam reveals no gallop and no friction rub.    No murmur heard.  Pulmonary/Chest: Effort normal and breath sounds normal. No respiratory distress. Right breast exhibits no  skin change. Left breast exhibits no mass, no skin change and no tenderness. Breasts are symmetrical.   S/p bilateral mastectomies.  Well healed mastectomy incisions with no chest wall mass or nodularity. No axillary or supraclavicular adenopathy.  Telangiectasias at left axilla secondary to radiation.    Abdominal: Soft. Bowel sounds are normal. She exhibits no distension and no mass. There is no tenderness. There is no rebound and no guarding.   Musculoskeletal: Normal range of motion. She exhibits no edema.   No spinal or paraspinal tenderness to palpation     Lymphadenopathy:     She has no cervical adenopathy.   Neurological: She is alert and oriented to person, place, and time. No cranial nerve deficit. She exhibits normal muscle tone. Coordination normal.   Skin: Skin is warm and dry. No rash noted. No erythema. No pallor.   Psychiatric: She has a normal mood and affect. Her behavior is normal. Judgment and thought content normal.   Nursing note and vitals reviewed.      Assessment:       1. Malignant neoplasm of upper-outer quadrant of right breast in female, estrogen receptor positive    2. Hypercalcemia        Plan:       1)Continue Arimidex and return to clinic in 6 months with labs.  2)slight bump in calcium; Will check PTH with next labs for higher end of normal calcium; she remains off of calcium supplement.  Patient encouraged to hydrate.     Distress Screening Results: Psychosocial Distress screening score of Distress Score: 4 noted and reviewed. No intervention indicated.

## 2018-08-14 NOTE — PROGRESS NOTES
Per 30-day average blood pressure is at goal of <130/80 mmHg. I will continue monitoring and follow-up in 8 weeks, sooner if blood pressure begins to trend up or down.   Last 5 Patient Entered Readings                                      Current 30 Day Average: 127/71     Recent Readings 8/9/2018 8/3/2018 7/27/2018 7/21/2018 7/13/2018    SBP (mmHg) 130 125 132 122 112    DBP (mmHg) 73 69 72 70 57    Pulse 78 86 88 81 84

## 2018-08-15 ENCOUNTER — CLINICAL SUPPORT (OUTPATIENT)
Dept: SMOKING CESSATION | Facility: CLINIC | Age: 68
End: 2018-08-15
Payer: COMMERCIAL

## 2018-08-15 DIAGNOSIS — F17.200 TOBACCO USE DISORDER: ICD-10-CM

## 2018-08-15 PROCEDURE — 99402 PREV MED CNSL INDIV APPRX 30: CPT | Mod: S$GLB,,, | Performed by: FAMILY MEDICINE

## 2018-08-15 NOTE — PROGRESS NOTES
Individual Follow-Up Form    8/15/2018    Clinical Status of Patient: Outpatient    Length of Service: 30 minutes    Continuing Medication: yes  Patches or Nicotine Lozenges    Other Medications: none     Target Symptoms: Withdrawal and medication side effects. The following were rated moderate (3) to severe (4) on TCRS:  · Moderate (3): desire/crave tobacco, shakiness  · Severe (4): vivid dreams    Comments:  Pt seen in office today. She continues to smoke18 cigs/day. Pt remains on tobacco cessation medication of 21 mg nicotine patch QD. She C/O of vivid dreams while having nicotine patch on 24 hours. Explained that that could happen and not to worry. Asked her to remove patch approx. 30 min prior to bed to see if that helps. She also C/O of shakiness. She also states she has a lot of anxiety. Watched grand kids over the weekend. Stress level a little lower now. No other adverse effects/mental changes noted at this time. Pt asked to reduce current smoking rate by 6 cigs/day. Reviewed coping strategies/habitual behavior/relapse prevention with patient. Exhaled carbon monoxide level was 14 ppm per Smokerlyzer (0-6 non-smoker). Will see pt back in office in 1 wk.     Diagnosis: F17.200    Next Visit: 1 week

## 2018-08-15 NOTE — Clinical Note
Pt seen in office today. She continues to smoke18 cigs/day. Pt remains on tobacco cessation medication of 21 mg nicotine patch QD. She C/O of vivid dreams while having nicotine patch on 24 hours. Explained that that could happen and not to worry. Asked her to remove patch approx. 30 min prior to bed to see if that helps. She also C/O of shakiness. She also states she has a lot of anxiety. Watched grand kids over the weekend. Stress level a little lower now. No other adverse effects/mental changes noted at this time. Pt asked to reduce current smoking rate by 6 cigs/day. Reviewed coping strategies/habitual behavior/relapse prevention with patient. Exhaled carbon monoxide level was 14 ppm per Smokerlyzer (0-6 non-smoker). Will see pt back in office in 1 wk.

## 2018-08-19 DIAGNOSIS — I10 ESSENTIAL HYPERTENSION: ICD-10-CM

## 2018-08-21 ENCOUNTER — PATIENT OUTREACH (OUTPATIENT)
Dept: OTHER | Facility: OTHER | Age: 68
End: 2018-08-21

## 2018-08-21 RX ORDER — LISINOPRIL 40 MG/1
TABLET ORAL
Qty: 90 TABLET | Refills: 0 | Status: SHIPPED | OUTPATIENT
Start: 2018-08-21 | End: 2018-11-20 | Stop reason: SDUPTHER

## 2018-08-21 NOTE — PROGRESS NOTES
Last 5 Patient Entered Readings                                      Current 30 Day Average: 129/71     Recent Readings 8/17/2018 8/17/2018 8/9/2018 8/3/2018 7/27/2018    SBP (mmHg) 129 136 130 125 132    DBP (mmHg) 70 75 73 69 72    Pulse 80 83 78 86 88        Patient also discussed last week being a busy week and feeling stressed with brother's surgery.     Patient reports being rushed when submitting most recent reading. Encouraged patient to wait 5-10 minutes to relax prior to taking readings.     Patient states she may be out of town in 2 weeks but is not entirely sure. She states HC can attempt to call.    Digital Medicine: Health  Follow Up    Lifestyle Modifications:    1.Dietary Modifications (Sodium intake <2,000mg/day, food labels, dining out): Patient reports limiting the chips to 1x/week. She states planning on eating cracker and cheese. She states reading labels and purchasing low-sodium Triscuits. Encouraged patient to continue reading the labels.       2.Physical Activity: Patient reports swimming 2x last week. She states wishing she could swim more but lack of lifeguards at current pool. She reports pool is outside. She states having limited mobility and swimming helps with low-impact on joints. She states thinking walking may be best for her. Encouraged patient to start thinking about walking. She states she will.      3.Medication Therapy: Patient has been compliant with the medication regimen.    4.Patient has the following medication side effects/concerns:   (Frequency/Alleviating factors/Precipitating factors, etc.)     Follow up with Mr. Graciela Aranda completed. No further questions or concerns. Will continue follow up to achieve health goals.

## 2018-08-22 ENCOUNTER — CLINICAL SUPPORT (OUTPATIENT)
Dept: SMOKING CESSATION | Facility: CLINIC | Age: 68
End: 2018-08-22
Payer: COMMERCIAL

## 2018-08-22 DIAGNOSIS — F17.210 NICOTINE DEPENDENCE, CIGARETTES, UNCOMPLICATED: ICD-10-CM

## 2018-08-22 DIAGNOSIS — F17.200 NICOTINE DEPENDENCE: ICD-10-CM

## 2018-08-22 PROCEDURE — 99402 PREV MED CNSL INDIV APPRX 30: CPT | Mod: S$GLB,,, | Performed by: FAMILY MEDICINE

## 2018-08-22 RX ORDER — IBUPROFEN 200 MG
1 TABLET ORAL DAILY
Qty: 28 PATCH | Refills: 0 | Status: SHIPPED | OUTPATIENT
Start: 2018-08-22 | End: 2018-09-19 | Stop reason: SDUPTHER

## 2018-08-22 NOTE — PROGRESS NOTES
Individual Follow-Up Form    8/22/2018    Clinical Status of Patient: Outpatient    Length of Service: 30 minutes    Continuing Medication: yes  Patches    Other Medications: none     Target Symptoms: Withdrawal and medication side effects. The following were  rated moderate (3) to severe (4) on TCRS:  · Moderate (3): desire/crave tobacco  · Severe (4): none    Comments:  Pt seen in office today. She continues to smoke 12 cigs/day. Pt remains on tobacco cessation medication of 21 mg nicotine patch QD and 2 mg nicotine lozenge PRN (1-2 per hour in place of cigarettes). She is not currently using lozenges due to disagreeable taste. She states that she has been awakening at night and is smoking. She takes patch off before bed due to vivid dreaming. Discussed using lozenges when she awakens to help with cravings. Even if it is only for 5 min to help. She agrees to try. She also relates stress remains a factor in her continues smoking. Asked her to try to stay busy. She lives alone and does not go out. Gave her the number for [employer] to see if she could find some things to do during the day. No other adverse effects/mental changes noted at this time. Pt asked to reduce current smoking rate by 3 cigs/day. Reviewed coping strategies/habitual behavior/relapse prevention with patient. Exhaled carbon monoxide level was 14 ppm per Smokerlyzer (0-6 non-smoker). Will see pt back in office in 1 wk.     Diagnosis: F17.200    Next Visit: 1 week

## 2018-08-22 NOTE — Clinical Note
Pt seen in office today. She continues to smoke 12 cigs/day. Pt remains on tobacco cessation medication of 21 mg nicotine patch QD and 2 mg nicotine lozenge PRN (1-2 per hour in place of cigarettes). She is not currently using lozenges due to disagreeable taste. She states that she has been awakening at night and is smoking. She takes patch off before bed due to vivid dreaming. Discussed using lozenges when she awakens to help with cravings. Even if it is only for 5 min to help. She agrees to try. She also relates stress remains a factor in her continues smoking. Asked her to try to stay busy. She lives alone and does not go out. Gave her the number for [employer] to see if she could find some things to do during the day. No other adverse effects/mental changes noted at this time. Pt asked to reduce current smoking rate by 3 cigs/day. Exhaled carbon monoxide level was 14 ppm per Smokerlyzer (0-6 non-smoker). Will see pt back in office in 1 wk.

## 2018-08-29 ENCOUNTER — CLINICAL SUPPORT (OUTPATIENT)
Dept: SMOKING CESSATION | Facility: CLINIC | Age: 68
End: 2018-08-29
Payer: COMMERCIAL

## 2018-08-29 DIAGNOSIS — F17.200 NICOTINE DEPENDENCE: ICD-10-CM

## 2018-08-29 PROCEDURE — 99402 PREV MED CNSL INDIV APPRX 30: CPT | Mod: S$GLB,,, | Performed by: FAMILY MEDICINE

## 2018-08-29 NOTE — Clinical Note
Pt seen in office today. She continues to smoke 12 cigs/day. Pt remains on tobacco cessation medication of 21 mg nicotine patch QD and 2 mg nicotine lozenge PRN (1-2 per hour in place of cigarettes). No adverse effects/mental changes noted at this time. Pt asked to reduce current smoking rate by 3 cigs/day. Reviewed coping strategies/habitual behavior/relapse prevention with patient. Exhaled carbon monoxide level was 9 ppm per Smokerlyzer (0-6 non-smoker). Will see pt back in office in 2 wks.

## 2018-08-29 NOTE — PROGRESS NOTES
Individual Follow-Up Form    8/29/2018    Clinical Status of Patient: Outpatient    Length of Service: 30 minutes    Continuing Medication: yes  Patches or Nicotine Lozenges    Other Medications: none     Target Symptoms: Withdrawal and medication side effects. The following were  rated moderate (3) to severe (4) on TCRS:  · Moderate (3): desire/crave tobacco  · Severe (4): none    Comments:  Pt seen in office today. She continues to smoke 12 cigs/day. Pt remains on tobacco cessation medication of 21 mg nicotine patch QD and 2 mg nicotine lozenge PRN (1-2 per hour in place of cigarettes). No adverse effects/mental changes noted at this time. Pt asked to reduce current smoking rate by 3 cigs/day. Reviewed coping strategies/habitual behavior/relapse prevention with patient. Exhaled carbon monoxide level was 9 ppm per Smokerlyzer (0-6 non-smoker). Will see pt back in office in 2 wks.     Diagnosis: F17.200    Next Visit: 2 weeks

## 2018-09-04 ENCOUNTER — PATIENT OUTREACH (OUTPATIENT)
Dept: OTHER | Facility: OTHER | Age: 68
End: 2018-09-04
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
## 2018-09-12 ENCOUNTER — CLINICAL SUPPORT (OUTPATIENT)
Dept: SMOKING CESSATION | Facility: CLINIC | Age: 68
End: 2018-09-12
Payer: COMMERCIAL

## 2018-09-12 DIAGNOSIS — F17.200 NICOTINE DEPENDENCE: ICD-10-CM

## 2018-09-12 PROCEDURE — 99402 PREV MED CNSL INDIV APPRX 30: CPT | Mod: S$GLB,,, | Performed by: FAMILY MEDICINE

## 2018-09-12 NOTE — Clinical Note
Pt seen in office today. She continues to smoke 10+ cigs/day. She states she had a stressful week last week and increased smoking rate a little. Encouraged her to keep up trying and not give up. Pt remains on tobacco cessation medication of 21 mg nicotine patch QD. No adverse effects/mental changes noted at this time. Pt asked to reduce current smoking rate by 3 cigs/day. Reviewed coping strategies/habitual behavior/relapse prevention with patient. Exhaled carbon monoxide level was 18 ppm per Smokerlyzer (0-6 non-smoker). Will see pt back in office in 1 wk.

## 2018-09-19 ENCOUNTER — CLINICAL SUPPORT (OUTPATIENT)
Dept: SMOKING CESSATION | Facility: CLINIC | Age: 68
End: 2018-09-19
Payer: COMMERCIAL

## 2018-09-19 DIAGNOSIS — F17.200 NICOTINE DEPENDENCE: ICD-10-CM

## 2018-09-19 DIAGNOSIS — F17.210 NICOTINE DEPENDENCE, CIGARETTES, UNCOMPLICATED: ICD-10-CM

## 2018-09-19 PROCEDURE — 99402 PREV MED CNSL INDIV APPRX 30: CPT | Mod: S$GLB,,, | Performed by: FAMILY MEDICINE

## 2018-09-19 RX ORDER — IBUPROFEN 200 MG
1 TABLET ORAL DAILY
Qty: 28 PATCH | Refills: 0 | Status: SHIPPED | OUTPATIENT
Start: 2018-09-19 | End: 2018-10-17 | Stop reason: SDUPTHER

## 2018-09-19 NOTE — Clinical Note
Pt seen in office today. She did not have appointment, but squeezed her in. She continues to smoke 10 cigs/day. Pt remains on tobacco cessation medication of 21 mg nicotine patch QD. She states she continues to have intense cravings for nicotine. Discussed adding zyban. She states she will think about it. No adverse effects/mental changes noted at this time. Pt asked to reduce current smoking rate by 3 cigs/day. Reviewed coping strategies/habitual behavior/relapse prevention with patient. Exhaled carbon monoxide level was 14 ppm per Smokerlyzer (0-6 non-smoker). Will see pt back in office in 2 wks.

## 2018-09-19 NOTE — PROGRESS NOTES
Individual Follow-Up Form    9/19/2018    Clinical Status of Patient: Outpatient    Length of Service: 30 minutes    Continuing Medication: yes  Patches    Other Medications: none     Target Symptoms: Withdrawal and medication side effects. The following were  rated moderate (3) to severe (4) on TCRS:  · Moderate (3): none  · Severe (4): desire/crave tobacco    Comments:  Pt seen in office today. She did not have appointment, but squeezed her in. She continues to smoke 10 cigs/day. Pt remains on tobacco cessation medication of 21 mg nicotine patch QD. She states she continues to have intense cravings for nicotine. Discussed adding zyban. She states she will think about it. No adverse effects/mental changes noted at this time. Pt asked to reduce current smoking rate by 3 cigs/day. Reviewed coping strategies/habitual behavior/relapse prevention with patient. Exhaled carbon monoxide level was 14 ppm per Smokerlyzer (0-6 non-smoker). Will see pt back in office in 2 wks.     Diagnosis: F17.200    Next Visit: 2 weeks

## 2018-09-20 DIAGNOSIS — I10 HYPERTENSION, ESSENTIAL: ICD-10-CM

## 2018-09-20 RX ORDER — ATORVASTATIN CALCIUM 20 MG/1
TABLET, FILM COATED ORAL
Qty: 90 TABLET | Refills: 0 | Status: SHIPPED | OUTPATIENT
Start: 2018-09-20 | End: 2018-12-16 | Stop reason: SDUPTHER

## 2018-09-20 RX ORDER — HYDROCHLOROTHIAZIDE 12.5 MG/1
12.5 TABLET ORAL DAILY
Qty: 90 TABLET | Refills: 2 | Status: SHIPPED | OUTPATIENT
Start: 2018-09-20 | End: 2019-06-11 | Stop reason: SDUPTHER

## 2018-09-26 ENCOUNTER — PATIENT OUTREACH (OUTPATIENT)
Dept: OTHER | Facility: OTHER | Age: 68
End: 2018-09-26

## 2018-09-26 NOTE — PROGRESS NOTES
"Last 5 Patient Entered Readings                                      Current 30 Day Average: 118/64     Recent Readings 9/21/2018 9/14/2018 9/7/2018 8/31/2018 8/24/2018    SBP (mmHg) 121 116 119 117 121    DBP (mmHg) 65 58 68 65 71    Pulse 76 84 87 83 71        Encounter mostly consisted of patient discussing her tobacco cessation. She states she is still working on it. Patient reports "feeling stuck" with where she is at. Patient reports using the patch. She states slacking off on the gum.  Patient reports using a water bottle to help with her "hand to mouth" association. She states smoking less than 1 pack per day. She states wanting to focus on discussing smoking cessation for future encounters. Will further assess this on next encounter.     Also consisted of patient discussing brother's health.      Digital Medicine: Health  Follow Up    Lifestyle Modifications:    1.Dietary Modifications (Sodium intake <2,000mg/day, food labels, dining out): Deferred    2.Physical Activity: She states wanting to eventually work on joining the [employer] to increase physical activity. Patient reports checking into the [employer] but is not sure about joining it.     3.Medication Therapy: Patient has been compliant with the medication regimen.    4.Patient has the following medication side effects/concerns:   (Frequency/Alleviating factors/Precipitating factors, etc.)     Follow up with Ms. Graciela Aranda completed. No further questions or concerns. Will continue to follow up to achieve health goals.        "

## 2018-10-03 ENCOUNTER — CLINICAL SUPPORT (OUTPATIENT)
Dept: SMOKING CESSATION | Facility: CLINIC | Age: 68
End: 2018-10-03
Payer: COMMERCIAL

## 2018-10-03 DIAGNOSIS — F17.200 NICOTINE DEPENDENCE: ICD-10-CM

## 2018-10-03 PROCEDURE — 99402 PREV MED CNSL INDIV APPRX 30: CPT | Mod: S$GLB,,, | Performed by: FAMILY MEDICINE

## 2018-10-03 NOTE — Clinical Note
Pt seen in office today. She continues to smoke 15 cigs/day. Pt remains on tobacco cessation medication of 21 mg nicotine patch QD. No adverse effects/mental changes noted at this time. Pt asked to reduce current smoking rate by 3 cigs/day. Reviewed coping strategies/habitual behavior/relapse prevention with patient. Exhaled carbon monoxide level was  ppm per Smokerlyzer (0-6 non-smoker). Will see pt back in office in 1 wk.

## 2018-10-03 NOTE — PROGRESS NOTES
Individual Follow-Up Form    10/3/2018    Clinical Status of Patient: Outpatient    Length of Service: 30 minutes    Continuing Medication: yes  Patches    Other Medications: none     Target Symptoms: Withdrawal and medication side effects. The following were  rated moderate (3) to severe (4) on TCRS:  · Moderate (3): desire/crave tobacco  · Severe (4): none    Comments:  Pt seen in office today. She continues to smoke 15 cigs/day. Pt remains on tobacco cessation medication of 21 mg nicotine patch QD. No adverse effects/mental changes noted at this time. Pt asked to reduce current smoking rate by 3 cigs/day. Reviewed coping strategies/habitual behavior/relapse prevention with patient. Exhaled carbon monoxide level was 13 ppm per Smokerlyzer (0-6 non-smoker). Will see pt back in office in 1 wk.     Diagnosis: F17.200    Next Visit: 1 week

## 2018-10-04 ENCOUNTER — PATIENT OUTREACH (OUTPATIENT)
Dept: OTHER | Facility: OTHER | Age: 68
End: 2018-10-04

## 2018-10-04 NOTE — PROGRESS NOTES
HPI:  Patient called stating that she has the start of a cold with some congestion no fever. She would like to know what medication is appropriate for hypertension. Also, discussed blood pressure and patient feels that she is doing well.adherent to current medication regimen with no questions or concerns. Patient is aware that blood pressure may fluctuate during illness.     Last 5 Patient Entered Readings                                      Current 30 Day Average: 121/66     Recent Readings 9/28/2018 9/21/2018 9/14/2018 9/7/2018 8/31/2018    SBP (mmHg) 127 121 116 119 117    DBP (mmHg) 71 65 58 68 65    Pulse 77 76 84 87 83        Patient denies s/s of hypotension (lightheadedness, dizziness, nausea, fatigue) associated with low readings. Instructed patient to inform me if this occurs, patient confirms understanding.    Patient denies s/s of hypertension (SOB, CP, severe headaches, changes in vision) associated with high readings. Instructed patient to go to the ED if BP >180/110 and accompanied by hypertensive s/s, patient confirms understanding.    Assessment:  Per 30-day average blood pressure is at goal.     Plan:  Continue current medication regimen.  Recommended Coricidin HBP or it's generic equivalent.   Counseled patient on the use of decongestants as they are associated with increased blood pressure and recommended use of saline for congestion instead.  Patient advised to consult PCP if symptoms persist for more that 7 days or worsen.    Patients health , Kenny Lux, will be following up every 3-4 weeks.   I will continue to monitor regularly and will follow-up in 12 weeks, sooner if blood pressure begins to trend upward or downward.     Current medication regimen:  Hypertension Medications             amLODIPine (NORVASC) 10 MG tablet TAKE 1 TABLET(10 MG) BY MOUTH EVERY DAY    hydroCHLOROthiazide (HYDRODIURIL) 12.5 MG Tab Take 1 tablet (12.5 mg total) by mouth once daily.    lisinopril  (PRINIVIL,ZESTRIL) 40 MG tablet TAKE 1 TABLET(40 MG) BY MOUTH EVERY DAY        Patient has my contact information and knows to call with any concerns or clinical changes.

## 2018-10-10 ENCOUNTER — CLINICAL SUPPORT (OUTPATIENT)
Dept: SMOKING CESSATION | Facility: CLINIC | Age: 68
End: 2018-10-10
Payer: COMMERCIAL

## 2018-10-10 DIAGNOSIS — F17.200 NICOTINE DEPENDENCE: ICD-10-CM

## 2018-10-10 PROCEDURE — 99402 PREV MED CNSL INDIV APPRX 30: CPT | Mod: S$GLB,,, | Performed by: FAMILY MEDICINE

## 2018-10-10 RX ORDER — NICOTINE 7MG/24HR
1 PATCH, TRANSDERMAL 24 HOURS TRANSDERMAL DAILY
Qty: 14 PATCH | Refills: 0 | Status: SHIPPED | OUTPATIENT
Start: 2018-10-10 | End: 2018-10-17 | Stop reason: SDUPTHER

## 2018-10-10 NOTE — PROGRESS NOTES
Individual Follow-Up Form    10/10/2018    Clinical Status of Patient: Outpatient    Length of Service: 30 minutes    Continuing Medication: yes  Patches    Other Medications: none     Target Symptoms: Withdrawal and medication side effects. The following were  rated moderate (3) to severe (4) on TCRS:  · Moderate (3): none  · Severe (4): desire/crave tobacco    Comments:  Pt seen in office today. She continues to smoke 18 cigs/day. Pt remains on tobacco cessation medication of 21 mg nicotine patch QD. Pt C/O of continued cravings for nicotine. Added 7 mg patch to help with cravings. No adverse effects/mental changes noted at this time. Pt asked to reduce current smoking rate by 3 cigs/day. Reviewed coping strategies/habitual behavior/relapse prevention with patient. Exhaled carbon monoxide level was 12 ppm per Smokerlyzer (0-6 non-smoker). Will see pt back in office in 1 wk.     Diagnosis: F17.200    Next Visit: 1 week

## 2018-10-10 NOTE — Clinical Note
Pt seen in office today. She continues to smoke 18cigs/day. Pt remains on tobacco cessation medication of 21 mg nicotine patch QD. Pt C/O of continued cravings for nicotine. Added 7 mg patch to help with cravings. No adverse effects/mental changes noted at this time. Pt asked to reduce current smoking rate by 3 cigs/day. Reviewed coping strategies/habitual behavior/relapse prevention with patient. Exhaled carbon monoxide level was 12 ppm per Smokerlyzer (0-6 non-smoker). Will see pt back in office in 1 wk.

## 2018-10-17 ENCOUNTER — CLINICAL SUPPORT (OUTPATIENT)
Dept: SMOKING CESSATION | Facility: CLINIC | Age: 68
End: 2018-10-17
Payer: COMMERCIAL

## 2018-10-17 DIAGNOSIS — F17.210 NICOTINE DEPENDENCE, CIGARETTES, UNCOMPLICATED: ICD-10-CM

## 2018-10-17 DIAGNOSIS — F17.200 NICOTINE DEPENDENCE: ICD-10-CM

## 2018-10-17 PROCEDURE — 99402 PREV MED CNSL INDIV APPRX 30: CPT | Mod: S$GLB,,, | Performed by: FAMILY MEDICINE

## 2018-10-17 RX ORDER — NICOTINE 7MG/24HR
1 PATCH, TRANSDERMAL 24 HOURS TRANSDERMAL DAILY
Qty: 14 PATCH | Refills: 0 | Status: SHIPPED | OUTPATIENT
Start: 2018-10-17 | End: 2018-10-31

## 2018-10-17 RX ORDER — IBUPROFEN 200 MG
1 TABLET ORAL DAILY
Qty: 28 PATCH | Refills: 0 | Status: SHIPPED | OUTPATIENT
Start: 2018-10-17 | End: 2018-11-16

## 2018-10-17 NOTE — PROGRESS NOTES
Individual Follow-Up Form    10/17/2018    Clinical Status of Patient: Outpatient    Length of Service: 30 minutes    Continuing Medication: yes  Patches    Other Medications: none     Target Symptoms: Withdrawal and medication side effects. The following were rated moderate (3) to severe (4) on TCRS:  · Moderate (3): desire/crave tobacco  · Severe (4): none    Comments:  Pt seen in office today. She continues to smoke 15 cigs/day. Pt remains on tobacco cessation medication of 21 mg nicotine patch QD and 7 mg nicotine patch QD. No adverse effects/mental changes noted at this time. Pt asked to reduce current smoking rate by 5 cigs/day. Reviewed coping strategies/habitual behavior/relapse prevention with patient. Exhaled carbon monoxide level was  ppm per Smokerlyzer (0-6 non-smoker). She has completed all assigned tobacco cessation visits and will continue with rate reduction until she quits or is eligible for renewed tobacco cessation trust benefits.    Diagnosis: F17.200    Next Visit: PRN

## 2018-10-17 NOTE — Clinical Note
Pt seen in office today. She continues to smoke 15 cigs/day. Pt remains on tobacco cessation medication of 21 mg nicotine patch QD and 7 mg nicotine patch QD. No adverse effects/mental changes noted at this time. Pt asked to reduce current smoking rate by 5 cigs/day. Reviewed coping strategies/habitual behavior/relapse prevention with patient. Exhaled carbon monoxide level was  ppm per Smokerlyzer (0-6 non-smoker). She has completed all assigned tobacco cessation visits and will continue with rate reduction until she quits or is eligible for renewed tobacco cessation trust benefits.

## 2018-10-23 DIAGNOSIS — I10 HYPERTENSION, ESSENTIAL: Primary | ICD-10-CM

## 2018-10-23 RX ORDER — AMLODIPINE BESYLATE 10 MG/1
10 TABLET ORAL DAILY
Qty: 90 TABLET | Refills: 0 | Status: SHIPPED | OUTPATIENT
Start: 2018-10-23 | End: 2019-01-17 | Stop reason: SDUPTHER

## 2018-10-24 ENCOUNTER — PATIENT OUTREACH (OUTPATIENT)
Dept: OTHER | Facility: OTHER | Age: 68
End: 2018-10-24

## 2018-10-24 NOTE — PROGRESS NOTES
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 Requested a call on 10/25.

## 2018-10-25 NOTE — PROGRESS NOTES
Last 5 Patient Entered Readings                                      Current 30 Day Average: 117/67     Recent Readings 10/24/2018 10/24/2018 10/16/2018 10/16/2018 10/11/2018    SBP (mmHg) 139 157 113 115 109    DBP (mmHg) 78 83 67 68 62    Pulse 85 84 95 95 89          Encounter also consisted of brother's health, eye MD appointment, recent trip to [place], keeping her cigarette to less than 1 pack per day.  Congratulated patient on decreasing cigarette consumption.       Patient attributes yesterday's elevated reading due to being stressed with taking care of grandchildren. Provided self-esteem statements to patient.     BP is controlled. She requested phone call after Thanksgiving.     Digital Medicine: Health  Follow Up    Lifestyle Modifications:    1.Dietary Modifications (Sodium intake <2,000mg/day, food labels, dining out): Deferred    2.Physical Activity: Deferred    3.Medication Therapy: Patient has been compliant with the medication regimen.    4.Patient has the following medication side effects/concerns:   (Frequency/Alleviating factors/Precipitating factors, etc.)     Follow up with Ms. Graciela Aranda completed. No further questions or concerns. Will continue to follow up to achieve health goals.

## 2018-10-30 NOTE — PROGRESS NOTES
Last 5 Patient Entered Readings                                      Current 30 Day Average: 115/66     Recent Readings 10/28/2018 10/24/2018 10/24/2018 10/16/2018 10/16/2018    SBP (mmHg) 115 139 157 113 115    DBP (mmHg) 69 78 83 67 68    Pulse 95 85 84 95 95          10/30-Patient called and wanted to make sure her readings were transmitting. Reassured patient readings were transmitting.

## 2018-11-12 ENCOUNTER — PES CALL (OUTPATIENT)
Dept: ADMINISTRATIVE | Facility: CLINIC | Age: 68
End: 2018-11-12

## 2018-11-20 DIAGNOSIS — I10 ESSENTIAL HYPERTENSION: ICD-10-CM

## 2018-11-20 RX ORDER — LISINOPRIL 40 MG/1
TABLET ORAL
Qty: 90 TABLET | Refills: 0 | Status: SHIPPED | OUTPATIENT
Start: 2018-11-20 | End: 2019-02-11 | Stop reason: SDUPTHER

## 2018-11-20 RX ORDER — LISINOPRIL 40 MG/1
TABLET ORAL
Qty: 90 TABLET | Refills: 0 | Status: SHIPPED | OUTPATIENT
Start: 2018-11-20 | End: 2018-11-20

## 2018-11-30 ENCOUNTER — PATIENT OUTREACH (OUTPATIENT)
Dept: OTHER | Facility: OTHER | Age: 68
End: 2018-11-30

## 2018-11-30 NOTE — PROGRESS NOTES
Last 5 Patient Entered Readings                                      Current 30 Day Average: 125/63     Recent Readings 11/25/2018 11/19/2018 11/19/2018 11/12/2018 11/4/2018    SBP (mmHg) 118 128 127 123 132    DBP (mmHg) 59 64 65 61 67    Pulse 97 97 100 81 89          11/30: Called patient to introduce myself as new health . Left VM.

## 2018-12-07 NOTE — PROGRESS NOTES
Last 5 Patient Entered Readings                                      Current 30 Day Average: 125/63     Recent Readings 12/2/2018 11/25/2018 11/19/2018 11/19/2018 11/12/2018    SBP (mmHg) 129 118 128 127 123    DBP (mmHg) 64 59 64 65 61    Pulse 91 97 97 100 81          Digital Medicine: Health  Follow Up    12/7: Called patient to introduce myself as new health .  Patient was grateful for the introduction.  Patient denies needing any help with anything at this time.    BP is well controlled.

## 2018-12-13 ENCOUNTER — CLINICAL SUPPORT (OUTPATIENT)
Dept: SMOKING CESSATION | Facility: CLINIC | Age: 68
End: 2018-12-13
Payer: COMMERCIAL

## 2018-12-13 DIAGNOSIS — F17.200 NICOTINE DEPENDENCE: Primary | ICD-10-CM

## 2018-12-13 PROCEDURE — 99407 BEHAV CHNG SMOKING > 10 MIN: CPT | Mod: S$GLB,,,

## 2018-12-13 NOTE — PROGRESS NOTES
Successful contact with patient regarding tobacco cessation quit #4. Pt state, she was unable to become tobacco free and she is ready to return to the program. Pt scheduled for quit #5 on 12/18/2018.  Pt informed of her benefit status, future telephone follow ups, and contact information. Will update the tobacco cessation smart form for 3 months on quit #4.

## 2018-12-17 RX ORDER — ATORVASTATIN CALCIUM 20 MG/1
TABLET, FILM COATED ORAL
Qty: 90 TABLET | Refills: 0 | Status: SHIPPED | OUTPATIENT
Start: 2018-12-17 | End: 2019-03-14 | Stop reason: SDUPTHER

## 2018-12-18 ENCOUNTER — CLINICAL SUPPORT (OUTPATIENT)
Dept: SMOKING CESSATION | Facility: CLINIC | Age: 68
End: 2018-12-18
Payer: COMMERCIAL

## 2018-12-18 DIAGNOSIS — F17.200 NICOTINE DEPENDENCE: ICD-10-CM

## 2018-12-18 PROCEDURE — 99404 PREV MED CNSL INDIV APPRX 60: CPT | Mod: S$GLB,,, | Performed by: FAMILY MEDICINE

## 2018-12-18 RX ORDER — IBUPROFEN 200 MG
1 TABLET ORAL DAILY
Qty: 28 PATCH | Refills: 0 | Status: SHIPPED | OUTPATIENT
Start: 2018-12-18 | End: 2019-01-17

## 2018-12-18 RX ORDER — IBUPROFEN 200 MG
1 TABLET ORAL DAILY
Qty: 28 PATCH | Refills: 0 | Status: CANCELLED | OUTPATIENT
Start: 2018-12-18 | End: 2019-01-17

## 2018-12-18 NOTE — PROGRESS NOTES
Individual Follow-Up Form    12/26/2018    Clinical Status of Patient: Outpatient    Length of Service: 30 minutes    Continuing Medication: yes  Patches    Other Medications: none     Target Symptoms: Withdrawal and medication side effects. The following were rated moderate (3) to severe (4) on TCRS:  · Moderate (3): desire/crave tobacco  · Severe (4): none    Comments:  Pt seen in office today. She continues to smoke 10 cigs/day. Pt remains on tobacco cessation medication of 21 mg BID. No adverse effects/mental changes noted at this time. Pt asked to reduce current smoking rate by 3 cigs/day. Reviewed coping strategies/habitual behavior/relapse prevention with patient. Exhaled carbon monoxide level was 16 ppm per Smokerlyzer (0-6 non-smoker). Will see pt back in office in 1 wk.     Diagnosis: F17.200    Next Visit: 1 week

## 2018-12-18 NOTE — PROGRESS NOTES
See Tobacco Cessation Intake Form for patient assessment and recommendations.  Exhaled carbon monoxide level was 10 ppm per Smokerlyzer.

## 2018-12-18 NOTE — Clinical Note
Pt seen at intake today. She currently smokes 20 cigs/day. Discussed tobacco cessation medication of 14 mg nicotine patch QD. Pt started on rate reduction and wait time of 15 min prior to smoking. Exhaled carbon monoxide level was 10 (0-6 non-smoker). Will see pt back in office in 1 wk.

## 2018-12-19 ENCOUNTER — TELEPHONE (OUTPATIENT)
Dept: HEMATOLOGY/ONCOLOGY | Facility: CLINIC | Age: 68
End: 2018-12-19

## 2018-12-19 NOTE — TELEPHONE ENCOUNTER
tried reaching out  To pt on today in regards to scheduling February f/u,  left a detail message on v/m to contact if need be.

## 2018-12-26 ENCOUNTER — CLINICAL SUPPORT (OUTPATIENT)
Dept: SMOKING CESSATION | Facility: CLINIC | Age: 68
End: 2018-12-26
Payer: COMMERCIAL

## 2018-12-26 DIAGNOSIS — F17.200 NICOTINE DEPENDENCE: ICD-10-CM

## 2018-12-26 PROCEDURE — 99402 PREV MED CNSL INDIV APPRX 30: CPT | Mod: S$GLB,,, | Performed by: FAMILY MEDICINE

## 2018-12-26 NOTE — Clinical Note
Pt seen in office today. She continues to smoke 10 cigs/day. Pt remains on tobacco cessation medication of 21 mg BID. No adverse effects/mental changes noted at this time. Pt asked to reduce current smoking rate by 3 cigs/day. Reviewed coping strategies/habitual behavior/relapse prevention with patient. Exhaled carbon monoxide level was 16 ppm per Smokerlyzer (0-6 non-smoker). Will see pt back in office in 1 wk.

## 2018-12-27 NOTE — PROGRESS NOTES
Last 5 Patient Entered Readings                                      Current 30 Day Average: 128/64     Recent Readings 12/23/2018 12/18/2018 12/9/2018 12/9/2018 12/2/2018    SBP (mmHg) 117 130 134 141 129    DBP (mmHg) 51 65 69 69 64    Pulse 97 91 107 113 91        12/27/18: Chart Reviewed. Patient's current 30-day average is at goal of <130/80 mmHg per 2017 ACC/AHA HTN guidelines. No medication recommendations at this time. I will continue monitoring and follow-up in 12 weeks, sooner if blood pressure begins to trend upward or downward.

## 2019-01-02 ENCOUNTER — CLINICAL SUPPORT (OUTPATIENT)
Dept: SMOKING CESSATION | Facility: CLINIC | Age: 69
End: 2019-01-02
Payer: COMMERCIAL

## 2019-01-02 DIAGNOSIS — F17.200 NICOTINE DEPENDENCE: ICD-10-CM

## 2019-01-02 PROCEDURE — 99402 PREV MED CNSL INDIV APPRX 30: CPT | Mod: S$GLB,,, | Performed by: FAMILY MEDICINE

## 2019-01-02 PROCEDURE — 99402 PR PREVENT COUNSEL,INDIV,30 MIN: ICD-10-PCS | Mod: S$GLB,,, | Performed by: FAMILY MEDICINE

## 2019-01-02 NOTE — PROGRESS NOTES
Individual Follow-Up Form    1/2/2019    Clinical Status of Patient: Outpatient    Length of Service: 30 minutes    Continuing Medication: yes  Patches    Other Medications: none     Target Symptoms: Withdrawal and medication side effects. The following were  rated moderate (3) to severe (4) on TCRS:  · Moderate (3): desire/crave tobacco  · Severe (4): none    Comments:  Pt seen in office today. She continues to smoke 15 cigs/day. Pt remains on tobacco cessation medication of 21 mg nicotine patch QD. No adverse effects/mental changes noted at this time. Stress over holidays and foot ball games are problematic with regards to her smoking. Discussed with her trying to wait longer between cigarettes and working a little harder at self control with habit. Pt asked to reduce current smoking rate by 3 cigs/day. Reviewed coping strategies/habitual behavior/relapse prevention with patient. Exhaled carbon monoxide level was 12 ppm per Smokerlyzer (0-6 non-smoker). Will see pt back in office in 1 wk.     Diagnosis: F17.200    Next Visit: 1 week

## 2019-01-02 NOTE — Clinical Note
Pt seen in office today. She continues to smoke 15 cigs/day. Pt remains on tobacco cessation medication of 21 mg nicotine patch QD. No adverse effects/mental changes noted at this time. Stress over holidays and foot ball games are problematic with regards to her smoking. Discussed with her trying to wait longer between cigarettes and working a little harder at self control with habit. Pt asked to reduce current smoking rate by 3 cigs/day. Reviewed coping strategies/habitual behavior/relapse prevention with patient. Exhaled carbon monoxide level was 12 ppm per Smokerlyzer (0-6 non-smoker). Will see pt back in office in 1 wk.

## 2019-01-08 ENCOUNTER — TELEPHONE (OUTPATIENT)
Dept: HEMATOLOGY/ONCOLOGY | Facility: CLINIC | Age: 69
End: 2019-01-08

## 2019-01-08 NOTE — TELEPHONE ENCOUNTER
----- Message from Melissa Benton sent at 1/8/2019 10:07 AM CST -----  Contact: Pt   Pt needs to schedule her f/u, she prefers mid-morning and in  Feb.   Contact : [phone]

## 2019-01-09 ENCOUNTER — CLINICAL SUPPORT (OUTPATIENT)
Dept: SMOKING CESSATION | Facility: CLINIC | Age: 69
End: 2019-01-09
Payer: COMMERCIAL

## 2019-01-09 DIAGNOSIS — F17.200 NICOTINE DEPENDENCE: ICD-10-CM

## 2019-01-09 PROCEDURE — 99402 PR PREVENT COUNSEL,INDIV,30 MIN: ICD-10-PCS | Mod: S$GLB,,, | Performed by: FAMILY MEDICINE

## 2019-01-09 PROCEDURE — 99402 PREV MED CNSL INDIV APPRX 30: CPT | Mod: S$GLB,,, | Performed by: FAMILY MEDICINE

## 2019-01-09 NOTE — PROGRESS NOTES
Individual Follow-Up Form    1/9/2019    Clinical Status of Patient: Outpatient    Length of Service: 30 minutes    Continuing Medication: yes  Patches    Other Medications: none     Target Symptoms: Withdrawal and medication side effects. The following were rated moderate (3) to severe (4) on TCRS:  · Moderate (3): desire/crave tobacco  · Severe (4): none    Comments: Pt seen in office today. She continues to smoke 15 cigs/day. Pt remains on tobacco cessation medication of 14 mg nicotine patch QD. No adverse effects/mental changes noted at this time. Pt asked to reduce current smoking rate by 3 cigs/day. Reviewed coping strategies/habitual behavior/relapse prevention with patient. Exhaled carbon monoxide level was 8 ppm per Smokerlyzer (0-6 non-smoker). Will see pt back in office in 1 wk.     Diagnosis: F17.200    Next Visit: 1 week

## 2019-01-09 NOTE — Clinical Note
Pt seen in office today. She continues to smoke 15 cigs/day. Pt remains on tobacco cessation medication of 14 mg nicotine patch QD. No adverse effects/mental changes noted at this time. Pt asked to reduce current smoking rate by 3 cigs/day. Reviewed coping strategies/habitual behavior/relapse prevention with patient. Exhaled carbon monoxide level was 8 ppm per Smokerlyzer (0-6 non-smoker). Will see pt back in office in 1 wk.

## 2019-01-15 ENCOUNTER — CLINICAL SUPPORT (OUTPATIENT)
Dept: SMOKING CESSATION | Facility: CLINIC | Age: 69
End: 2019-01-15
Payer: COMMERCIAL

## 2019-01-15 DIAGNOSIS — F17.200 NICOTINE DEPENDENCE: ICD-10-CM

## 2019-01-15 PROCEDURE — 99402 PREV MED CNSL INDIV APPRX 30: CPT | Mod: S$GLB,,, | Performed by: FAMILY MEDICINE

## 2019-01-15 PROCEDURE — 99402 PR PREVENT COUNSEL,INDIV,30 MIN: ICD-10-PCS | Mod: S$GLB,,, | Performed by: FAMILY MEDICINE

## 2019-01-15 NOTE — Clinical Note
Pt seen in office today. She continues to smoke 15 cigs/day. Pt remains on tobacco cessation medication of 21 mg nicotine patch QD. No adverse effects/mental changes noted at this time. Pt asked to reduce current smoking rate by 3 cigs/day. Reviewed coping strategies/habitual behavior/relapse prevention with patient. Exhaled carbon monoxide level was 13 ppm per Smokerlyzer (0-6 non-smoker). Will see pt back in office in 1 wk.

## 2019-01-15 NOTE — PROGRESS NOTES
Individual Follow-Up Form    1/15/2019    Clinical Status of Patient: Outpatient    Length of Service: 30 minutes    Continuing Medication: yes  Patches    Other Medications: none     Target Symptoms: Withdrawal and medication side effects. The following were rated moderate (3) to severe (4) on TCRS:  · Moderate (3): desire/crave tobacco  · Severe (4): none    Comments:  Pt seen in office today. She continues to smoke 15 cigs/day. Pt remains on tobacco cessation medication of 21 mg nicotine patch QD. No adverse effects/mental changes noted at this time. Pt asked to reduce current smoking rate by 3 cigs/day. Reviewed coping strategies/habitual behavior/relapse prevention with patient. Exhaled carbon monoxide level was 13 ppm per Smokerlyzer (0-6 non-smoker).  Pt has completed all assigned visits. Explained to her that she is however eligible for more visits in May. She will call then to re-start.     Diagnosis: F17.200    Next Visit: 1 week

## 2019-01-16 ENCOUNTER — PATIENT OUTREACH (OUTPATIENT)
Dept: OTHER | Facility: OTHER | Age: 69
End: 2019-01-16

## 2019-01-16 NOTE — PROGRESS NOTES
"Last 5 Patient Entered Readings                                      Current 30 Day Average: 126/69     Recent Readings 1/13/2019 1/13/2019 1/6/2019 12/30/2018 12/23/2018    SBP (mmHg) 137 142 126 119 117    DBP (mmHg) 68 82 76 69 51    Pulse 102 99 90 93 97            Digital Medicine: Health  Follow Up    1/16: Left voicemail to follow up with Ms. Graciela Aranda.  Current BP average 126/69 mmHg is at goal, <130/80 mmHg.  BP is well controlled. Patient called back.      Digital Medicine: Health  Follow Up    Patient attributes elevated readings on 1/13 to the [place] game.      Patient states she is still going to the "smoking cessation program".  Patient states it has been up and down.  I encouraged patient to continue to be positive and keep fighting to beat it.  Encouraged patient to reach out to me if she needs anything.  Patient verbally understood.     Lifestyle Modifications:    1.Dietary Modifications (Sodium intake <2,000mg/day, food labels, dining out):   Deferred    2.Physical Activity:   Deferred    3.Medication Therapy:   Patient has been compliant with the medication regimen.    4.Patient has the following medication side effects/concerns: None  (Frequency/Alleviating factors/Precipitating factors, etc.)     Follow up with Ms. Graciela Aranda completed. No further questions or concerns. Will continue to follow up to achieve health goals.  Patient is currently at goal, 126/69 mmHg does not exceed <130/80 mmHg.              "

## 2019-01-17 DIAGNOSIS — I10 HYPERTENSION, ESSENTIAL: ICD-10-CM

## 2019-01-17 RX ORDER — AMLODIPINE BESYLATE 10 MG/1
TABLET ORAL
Qty: 90 TABLET | Refills: 0 | Status: SHIPPED | OUTPATIENT
Start: 2019-01-17 | End: 2019-04-17 | Stop reason: SDUPTHER

## 2019-01-18 ENCOUNTER — PES CALL (OUTPATIENT)
Dept: ADMINISTRATIVE | Facility: CLINIC | Age: 69
End: 2019-01-18

## 2019-01-21 ENCOUNTER — TELEPHONE (OUTPATIENT)
Dept: SMOKING CESSATION | Facility: CLINIC | Age: 69
End: 2019-01-21

## 2019-01-21 NOTE — TELEPHONE ENCOUNTER
Called patient to let her know that her benefits for tobacco cessation program will renew in May of this year. Asked her to try to continue with rate reduction until she can be seen again. No answer, left message.

## 2019-02-11 DIAGNOSIS — I10 ESSENTIAL HYPERTENSION: ICD-10-CM

## 2019-02-11 RX ORDER — LISINOPRIL 40 MG/1
TABLET ORAL
Qty: 90 TABLET | Refills: 0 | Status: SHIPPED | OUTPATIENT
Start: 2019-02-11 | End: 2019-02-20 | Stop reason: SINTOL

## 2019-02-12 ENCOUNTER — TELEPHONE (OUTPATIENT)
Dept: INTERNAL MEDICINE | Facility: CLINIC | Age: 69
End: 2019-02-12

## 2019-02-12 NOTE — TELEPHONE ENCOUNTER
----- Message from Salome Sandoval sent at 2/12/2019  9:13 AM CST -----  Contact: self/ [phone]  Patient would like to know when she is to come in for her next appointment and there is  nothing  noted under the recall tab. Please call tomorrow.

## 2019-02-20 ENCOUNTER — OFFICE VISIT (OUTPATIENT)
Dept: INTERNAL MEDICINE | Facility: CLINIC | Age: 69
End: 2019-02-20
Attending: FAMILY MEDICINE
Payer: MEDICARE

## 2019-02-20 VITALS
TEMPERATURE: 99 F | WEIGHT: 213.19 LBS | HEIGHT: 68 IN | OXYGEN SATURATION: 99 % | DIASTOLIC BLOOD PRESSURE: 60 MMHG | SYSTOLIC BLOOD PRESSURE: 124 MMHG | HEART RATE: 101 BPM | BODY MASS INDEX: 32.31 KG/M2

## 2019-02-20 DIAGNOSIS — I10 HYPERTENSION, ESSENTIAL: Primary | ICD-10-CM

## 2019-02-20 DIAGNOSIS — Z17.0 MALIGNANT NEOPLASM OF UPPER-OUTER QUADRANT OF RIGHT BREAST IN FEMALE, ESTROGEN RECEPTOR POSITIVE: ICD-10-CM

## 2019-02-20 DIAGNOSIS — H26.9 CATARACT, UNSPECIFIED CATARACT TYPE, UNSPECIFIED LATERALITY: ICD-10-CM

## 2019-02-20 DIAGNOSIS — Z90.13 S/P BILATERAL MASTECTOMY: ICD-10-CM

## 2019-02-20 DIAGNOSIS — C50.411 MALIGNANT NEOPLASM OF UPPER-OUTER QUADRANT OF RIGHT BREAST IN FEMALE, ESTROGEN RECEPTOR POSITIVE: ICD-10-CM

## 2019-02-20 DIAGNOSIS — C50.011 MALIGNANT NEOPLASM OF NIPPLE OF RIGHT BREAST IN FEMALE, UNSPECIFIED ESTROGEN RECEPTOR STATUS: ICD-10-CM

## 2019-02-20 DIAGNOSIS — E78.5 HYPERLIPIDEMIA, UNSPECIFIED HYPERLIPIDEMIA TYPE: ICD-10-CM

## 2019-02-20 DIAGNOSIS — F17.200 SMOKER: ICD-10-CM

## 2019-02-20 PROCEDURE — 99214 PR OFFICE/OUTPT VISIT, EST, LEVL IV, 30-39 MIN: ICD-10-PCS | Mod: S$PBB,,, | Performed by: FAMILY MEDICINE

## 2019-02-20 PROCEDURE — 99214 OFFICE O/P EST MOD 30 MIN: CPT | Mod: S$PBB,,, | Performed by: FAMILY MEDICINE

## 2019-02-20 PROCEDURE — 99999 PR PBB SHADOW E&M-EST. PATIENT-LVL III: CPT | Mod: PBBFAC,,, | Performed by: FAMILY MEDICINE

## 2019-02-20 PROCEDURE — 99999 PR PBB SHADOW E&M-EST. PATIENT-LVL III: ICD-10-PCS | Mod: PBBFAC,,, | Performed by: FAMILY MEDICINE

## 2019-02-20 PROCEDURE — 99213 OFFICE O/P EST LOW 20 MIN: CPT | Mod: PBBFAC | Performed by: FAMILY MEDICINE

## 2019-02-20 RX ORDER — ANASTROZOLE 1 MG/1
TABLET ORAL
Qty: 30 TABLET | Refills: 11 | Status: SHIPPED | OUTPATIENT
Start: 2019-02-20 | End: 2020-02-11 | Stop reason: SDUPTHER

## 2019-02-20 RX ORDER — LOSARTAN POTASSIUM 100 MG/1
100 TABLET ORAL DAILY
Qty: 90 TABLET | Refills: 1 | Status: SHIPPED | OUTPATIENT
Start: 2019-02-20 | End: 2019-06-11 | Stop reason: SDUPTHER

## 2019-02-20 NOTE — PROGRESS NOTES
Subjective:       Patient ID: Graciela Aranda is a 68 y.o. female.    Chief Complaint: Follow-up    HPI  Review of Systems   Constitutional: Negative for chills, fatigue, fever and unexpected weight change.   HENT: Negative for congestion and trouble swallowing.    Eyes: Positive for visual disturbance. Negative for redness.   Respiratory: Negative for cough, chest tightness and shortness of breath.    Cardiovascular: Negative for chest pain, palpitations and leg swelling.   Gastrointestinal: Negative for abdominal pain and blood in stool.   Genitourinary: Negative for difficulty urinating, hematuria and menstrual problem.   Musculoskeletal: Negative for arthralgias, back pain, gait problem, joint swelling, myalgias and neck pain.   Skin: Negative for color change and rash.   Neurological: Negative for tremors, speech difficulty, weakness, numbness and headaches.   Hematological: Negative for adenopathy. Does not bruise/bleed easily.   Psychiatric/Behavioral: Positive for dysphoric mood. Negative for behavioral problems, confusion and sleep disturbance. The patient is not nervous/anxious.        Objective:      Physical Exam   Constitutional: She is oriented to person, place, and time. She appears well-developed and well-nourished.   Eyes: No scleral icterus.   Neck: Normal range of motion. Neck supple.   Cardiovascular: Normal rate, normal heart sounds and intact distal pulses. Exam reveals no gallop and no friction rub.   No murmur heard.  Pulmonary/Chest: Effort normal and breath sounds normal. No respiratory distress. She has no wheezes. She has no rales.   Abdominal: Soft. She exhibits no abdominal bruit.   Musculoskeletal: She exhibits no edema.   Neurological: She is alert and oriented to person, place, and time. She displays no tremor. No cranial nerve deficit. Coordination and gait normal.   Skin: Skin is warm and dry. No rash noted. She is not diaphoretic. No erythema.   Psychiatric: She has a normal mood  and affect. Her behavior is normal. Judgment and thought content normal.   Nursing note and vitals reviewed.      Assessment:       1. Hypertension, essential    2. Hyperlipidemia, unspecified hyperlipidemia type    3. Malignant neoplasm of upper-outer quadrant of right breast in female, estrogen receptor positive    4. S/P bilateral mastectomy    5. Smoker    6. Cataract, unspecified cataract type, unspecified laterality        Plan:   Graciela was seen today for follow-up.    Diagnoses and all orders for this visit:    Hypertension, essential    Hyperlipidemia, unspecified hyperlipidemia type    Malignant neoplasm of upper-outer quadrant of right breast in female, estrogen receptor positive    S/P bilateral mastectomy  Comments:  2014    Smoker    Cataract, unspecified cataract type, unspecified laterality    Other orders  -     losartan (COZAAR) 100 MG tablet; Take 1 tablet (100 mg total) by mouth once daily.      See meds, orders, follow up, routing and instructions sections of encounter.  The patient is in for semi-annual followup.  She is in the Digital Hypertension   Management program.  We discussed trips she makes to [place], [place] and   [facility].  It is okay to discontinue the medication on those days.  I did discuss   lisinopril.  We will discontinue that at this time, switch to losartan.  I did   review her blood pressure measurements.  For the most part, they are good.  She   is diligent about doing this weekly or biweekly.  I think that is all she needs   to do.  She has had some stressful situations recently, checked her blood   pressure afterwards noting it to be high.    She had a DEXA scan in 2016 with a four-year followup recommendation and she has   been good with her visits with Hematology/Oncology.    Follow up with me in six months.  She has an outside ophthalmologist and a   potential for cataract surgery in the fall.  She will keep me posted will do a   preop at that time.      TEE/HN  dd:  02/20/2019 10:44:51 (CST)  td: 02/21/2019 06:11:41 (CST)  Doc ID   #[id number]  Job ID #[id number]    CC:

## 2019-02-26 ENCOUNTER — PATIENT OUTREACH (OUTPATIENT)
Dept: OTHER | Facility: OTHER | Age: 69
End: 2019-02-26

## 2019-02-26 ENCOUNTER — OFFICE VISIT (OUTPATIENT)
Dept: HEMATOLOGY/ONCOLOGY | Facility: CLINIC | Age: 69
End: 2019-02-26
Payer: MEDICARE

## 2019-02-26 ENCOUNTER — LAB VISIT (OUTPATIENT)
Dept: LAB | Facility: HOSPITAL | Age: 69
End: 2019-02-26
Attending: INTERNAL MEDICINE
Payer: MEDICARE

## 2019-02-26 VITALS
DIASTOLIC BLOOD PRESSURE: 64 MMHG | HEART RATE: 82 BPM | TEMPERATURE: 99 F | RESPIRATION RATE: 18 BRPM | BODY MASS INDEX: 23.19 KG/M2 | OXYGEN SATURATION: 99 % | HEIGHT: 68 IN | SYSTOLIC BLOOD PRESSURE: 144 MMHG | WEIGHT: 153 LBS

## 2019-02-26 DIAGNOSIS — C50.411 MALIGNANT NEOPLASM OF UPPER-OUTER QUADRANT OF RIGHT BREAST IN FEMALE, ESTROGEN RECEPTOR POSITIVE: ICD-10-CM

## 2019-02-26 DIAGNOSIS — M81.8 OTHER OSTEOPOROSIS WITHOUT CURRENT PATHOLOGICAL FRACTURE: ICD-10-CM

## 2019-02-26 DIAGNOSIS — E55.9 VITAMIN D DEFICIENCY: ICD-10-CM

## 2019-02-26 DIAGNOSIS — F17.200 SMOKER: ICD-10-CM

## 2019-02-26 DIAGNOSIS — Z17.0 MALIGNANT NEOPLASM OF UPPER-OUTER QUADRANT OF LEFT BREAST IN FEMALE, ESTROGEN RECEPTOR POSITIVE: Primary | ICD-10-CM

## 2019-02-26 DIAGNOSIS — M85.9 DISORDER OF BONE DENSITY AND STRUCTURE, UNSPECIFIED: ICD-10-CM

## 2019-02-26 DIAGNOSIS — C50.412 MALIGNANT NEOPLASM OF UPPER-OUTER QUADRANT OF LEFT BREAST IN FEMALE, ESTROGEN RECEPTOR POSITIVE: Primary | ICD-10-CM

## 2019-02-26 DIAGNOSIS — E83.52 HYPERCALCEMIA: ICD-10-CM

## 2019-02-26 DIAGNOSIS — Z17.0 MALIGNANT NEOPLASM OF UPPER-OUTER QUADRANT OF RIGHT BREAST IN FEMALE, ESTROGEN RECEPTOR POSITIVE: ICD-10-CM

## 2019-02-26 LAB
ALBUMIN SERPL BCP-MCNC: 4.4 G/DL
ALP SERPL-CCNC: 88 U/L
ALT SERPL W/O P-5'-P-CCNC: 29 U/L
ANION GAP SERPL CALC-SCNC: 10 MMOL/L
AST SERPL-CCNC: 23 U/L
BILIRUB SERPL-MCNC: 0.7 MG/DL
BUN SERPL-MCNC: 17 MG/DL
CALCIUM SERPL-MCNC: 10.8 MG/DL
CHLORIDE SERPL-SCNC: 103 MMOL/L
CO2 SERPL-SCNC: 25 MMOL/L
CREAT SERPL-MCNC: 1.2 MG/DL
ERYTHROCYTE [DISTWIDTH] IN BLOOD BY AUTOMATED COUNT: 14.3 %
EST. GFR  (AFRICAN AMERICAN): 53.3 ML/MIN/1.73 M^2
EST. GFR  (NON AFRICAN AMERICAN): 46.2 ML/MIN/1.73 M^2
GLUCOSE SERPL-MCNC: 101 MG/DL
HCT VFR BLD AUTO: 41.1 %
HGB BLD-MCNC: 13.6 G/DL
IMM GRANULOCYTES # BLD AUTO: 0.03 K/UL
MCH RBC QN AUTO: 33.4 PG
MCHC RBC AUTO-ENTMCNC: 33.1 G/DL
MCV RBC AUTO: 101 FL
NEUTROPHILS # BLD AUTO: 5.2 K/UL
PLATELET # BLD AUTO: 224 K/UL
PMV BLD AUTO: 10 FL
POTASSIUM SERPL-SCNC: 4 MMOL/L
PROT SERPL-MCNC: 7.5 G/DL
PTH-INTACT SERPL-MCNC: 60 PG/ML
RBC # BLD AUTO: 4.07 M/UL
SODIUM SERPL-SCNC: 138 MMOL/L
WBC # BLD AUTO: 7.24 K/UL

## 2019-02-26 PROCEDURE — 99999 PR PBB SHADOW E&M-EST. PATIENT-LVL III: ICD-10-PCS | Mod: PBBFAC,,, | Performed by: INTERNAL MEDICINE

## 2019-02-26 PROCEDURE — 80053 COMPREHEN METABOLIC PANEL: CPT

## 2019-02-26 PROCEDURE — 99213 OFFICE O/P EST LOW 20 MIN: CPT | Mod: PBBFAC | Performed by: INTERNAL MEDICINE

## 2019-02-26 PROCEDURE — 99214 OFFICE O/P EST MOD 30 MIN: CPT | Mod: S$PBB,,, | Performed by: INTERNAL MEDICINE

## 2019-02-26 PROCEDURE — 36415 COLL VENOUS BLD VENIPUNCTURE: CPT

## 2019-02-26 PROCEDURE — 83970 ASSAY OF PARATHORMONE: CPT

## 2019-02-26 PROCEDURE — 85027 COMPLETE CBC AUTOMATED: CPT

## 2019-02-26 PROCEDURE — 99214 PR OFFICE/OUTPT VISIT, EST, LEVL IV, 30-39 MIN: ICD-10-PCS | Mod: S$PBB,,, | Performed by: INTERNAL MEDICINE

## 2019-02-26 PROCEDURE — 99999 PR PBB SHADOW E&M-EST. PATIENT-LVL III: CPT | Mod: PBBFAC,,, | Performed by: INTERNAL MEDICINE

## 2019-02-26 NOTE — PROGRESS NOTES
Subjective:       Patient ID: Graciela Aranda is a 68 y.o. female.    Chief Complaint: No chief complaint on file.    HPI     This is a 68 year old female with T2N1 ER/KY + left breast cancer, currently on adjuvant Arimidex, here for follow up.   Doing well in the interval.  Weight stable.  Good energy level- active with grandchildren.  Spends a lot of time in [place] at her other home.  States always stressed when she comes to see us but feels she is overall handling better.  Tolerating Arimidex well.  No musculoskeletal complaints.   Her mother passed away last year and that has relieved some stress. Required a lot of physical and emotional support.  She started smoking cessation program yesterday, has patch on.  She is not taking calcium supplement due to past hypercalcemia but remains on Vitamin D.      Diagnosis:This is a 68 year old female with T2N1 left breast cancer (ER/KY positive, HER-2 negative) history outlined below and currently on adjuvant Arimidex.  Oncology History:  The patient is a 68-year-old white female with left invasive breast cancer (T2 N1 M0) ER+, KY+, Her2 negative with positive sentinel lymph node from left axilla who completed 4 cycles of AC and 4 cycles of Taxol in the neoadjuvant setting.  Post bilateral mastectomy on 05/29/2015 with Dr. Monte.. Her final pathology revealed:  The left breast had a 2.2 cm of residual invasive ductal carcinoma of overall grade 2 disease. Surgical margins were negative.  The right breast revealed no evidence of invasive carcinoma. +ADH  There were six left sentinel lymph nodes sampled within four sentinel lymph node specimens;  three of these lymph nodes were positive, all of which were noted intraoperatively to be positive. The sizes of the metastatic foci were 3 mm, 2    mm and 3 mm respectively.   She had been consented for the NSABP B-51, an Pollock trial with the positive lymph node intraoperatively. She was randomized to no    axillary lymph node  dissection and will be proceeding with adjuvant left axillary radiotherapy.  She was initially scheduled for a left total complete mastectomy without reconstruction and with sentinel lymph node biopsy 11/03/2014. But the preop MRI, revealed a contralateral suspicious right-sided abnormality located at the 3 o'clock position of the right breast. The MRI also revealed a suspicious lymph node in the left axilla. We obtained target ultrasound of both areas, which confirmed the areas of abnormality in both the right medial breast at the 3 o'clock position as well as a 15 mm left axillary lymph node.She has undergone ultrasound-guided core needle biopsy of the right medial breast mass as well as the left axillary lymph node.   The pathology from right breast mass show fibroadenoma but axillary lymph nodes is positive for involvement with adenocarcinoma ER/ND positive, HER-2/anderson negative tumor.    Her left-sided breast cancer also revealed an ER/ND positive, HER-2/anderson negative tumor that was approximately 5 cm on clinical presentation and on MRI, this mass also measured 41 mm in the middle lower outer region of the left breast located 5 cm from the nipple.       Health maintenance:  BMD 1/2017  Lumbar Spine: Lumbar bone mineral density L1-L4 is 1.190g/cm2, which is a t-score of 1.3. The z-score is 3.2.  Total Hip: The total hip bone mineral density is 0.872g/cm2.  The t-score is -0.6, and the z-score is 0.8.  Femoral neck BMD is 0.770g/cm2 and the t-score is -0.7.  COMPARISONS:  Date Location BMD T-score  10/08/15 L-spine 1.179 1.2  Total Hip 0.873 -0.6  Impression   Elevated BMD of the lumbar spine. No significant change since prior study.  Recommendations:  1) Adequate calcium and Vitamin D therapy  2) Appropriate exercise  3) Consider x-ray of lumbar spine to evaluate elevated Z score of 3.2.  4) Stop smoking  5) Consider repeat BMD in 4 years     Colonoscopy - Had her colonoscopy in the interval which revealed 1 - 3mm  polyp that was removed, hyperplastic on pathology. Repeat recommended in 5 years (2021).    Review of Systems   Constitutional: Negative for activity change, appetite change, chills, diaphoresis, fatigue, fever and unexpected weight change.   HENT: Negative for congestion, dental problem, ear pain, hearing loss, mouth sores, nosebleeds, rhinorrhea, sinus pressure, sinus pain, sneezing, sore throat, tinnitus and trouble swallowing.    Eyes: Negative for redness and visual disturbance.   Respiratory: Negative for cough, chest tightness, shortness of breath and wheezing.    Cardiovascular: Negative for chest pain, palpitations and leg swelling.   Gastrointestinal: Negative for abdominal distention, abdominal pain, blood in stool, constipation, diarrhea, nausea and vomiting.   Genitourinary: Negative for decreased urine volume, difficulty urinating, dysuria, frequency, hematuria and urgency.   Musculoskeletal: Negative for arthralgias, back pain, gait problem, joint swelling and neck pain.   Skin: Negative for color change, pallor, rash and wound.   Neurological: Negative for dizziness, weakness, light-headedness, numbness and headaches.   Hematological: Negative for adenopathy. Does not bruise/bleed easily.   Psychiatric/Behavioral: Negative for dysphoric mood and sleep disturbance. The patient is nervous/anxious.        Objective:      Physical Exam   Constitutional: She is oriented to person, place, and time. She appears well-developed and well-nourished. No distress.   Presents alone   HENT:   Head: Normocephalic and atraumatic.   Right Ear: External ear normal.   Left Ear: External ear normal.   Nose: Nose normal.   Mouth/Throat: Oropharynx is clear and moist. No oropharyngeal exudate.   Eyes: Conjunctivae and EOM are normal. Pupils are equal, round, and reactive to light. Right eye exhibits no discharge. Left eye exhibits no discharge. No scleral icterus.   Neck: Normal range of motion. Neck supple. No tracheal  deviation present. No thyromegaly present.   Cardiovascular: Normal rate, regular rhythm, normal heart sounds and intact distal pulses. Exam reveals no gallop and no friction rub.   No murmur heard.  Pulmonary/Chest: Effort normal and breath sounds normal. No respiratory distress. She has no wheezes. She has no rales. She exhibits no tenderness. Right breast exhibits no skin change. Left breast exhibits no mass, no skin change and no tenderness. Breasts are symmetrical.   Post bilateral mastectomy without evidence of chest wall recurrence  No lymphadenopathy   Abdominal: Soft. Bowel sounds are normal. She exhibits no distension and no mass. There is no tenderness. There is no rebound and no guarding.   No organomegaly   Musculoskeletal: Normal range of motion. She exhibits no edema, tenderness or deformity.   Lymphadenopathy:     She has no cervical adenopathy.   Neurological: She is alert and oriented to person, place, and time. No cranial nerve deficit. She exhibits normal muscle tone. Coordination normal.   Skin: Skin is warm and dry. No rash noted. She is not diaphoretic. No erythema. No pallor.   Psychiatric: She has a normal mood and affect. Her behavior is normal. Judgment and thought content normal.   Nursing note and vitals reviewed.    Labs- reviewed  Assessment:       1. Malignant neoplasm of upper-outer quadrant of left breast in female, estrogen receptor positive    2. Vitamin D deficiency    3. Smoker        Plan:     1. ANU clinically  Continue Arimidex  RTC 6 months with labs  Knows to call for any issues  2. On replacement  Due for repeat BMD this year  3. Has on patch, still actively trying to quit      Distress Screening Results: Psychosocial Distress screening score of Distress Score: 0 noted and reviewed. No intervention indicated.

## 2019-02-26 NOTE — PROGRESS NOTES
Last 5 Patient Entered Readings                                      Current 30 Day Average: 124/66     Recent Readings 2/23/2019 2/16/2019 2/10/2019 2/10/2019 2/3/2019    SBP (mmHg) 123 116 151 143 134    DBP (mmHg) 65 69 68 68 79    Pulse 81 81 89 93 100        Digital Medicine: Health  Follow Up    Encounter was brief  BP is well controlled.  Patient reports she is leaving for [place] during [place] gr[place].   Encounter also consisted of talking about anjel gras and pets. Patient was grateful for the check in.      Lifestyle Modifications:    1.Dietary Modifications (Sodium intake <2,000mg/day, food labels, dining out):   Deferred    2.Physical Activity:   Deferred    3.Medication Therapy:   Patient has been compliant with the medication regimen.    4.Patient has the following medication side effects/concerns: None  (Frequency/Alleviating factors/Precipitating factors, etc.)     Follow up with Ms. Graciela Aranda completed. No further questions or concerns. Will continue to follow up to achieve health goals.  Patient is currently at goal, 124/66 mmHg does not exceed <130/80 mmHg.

## 2019-02-27 ENCOUNTER — TELEPHONE (OUTPATIENT)
Dept: HEMATOLOGY/ONCOLOGY | Facility: CLINIC | Age: 69
End: 2019-02-27

## 2019-02-27 NOTE — TELEPHONE ENCOUNTER
Called patient we scheduled appointment at [facility] for the BMD on 3/12. Mailed appt slip      ----- Message from Silvia Collins MD sent at 2/26/2019 12:10 PM CST -----  - due for bone mineral density but please call when ready to schedule

## 2019-03-14 ENCOUNTER — TELEPHONE (OUTPATIENT)
Dept: HEMATOLOGY/ONCOLOGY | Facility: CLINIC | Age: 69
End: 2019-03-14

## 2019-03-14 RX ORDER — ATORVASTATIN CALCIUM 20 MG/1
TABLET, FILM COATED ORAL
Qty: 90 TABLET | Refills: 0 | Status: SHIPPED | OUTPATIENT
Start: 2019-03-14 | End: 2019-06-11 | Stop reason: SDUPTHER

## 2019-03-14 NOTE — TELEPHONE ENCOUNTER
Call to pt.   Informed pt that BMD WNL.   Pt verbalized understanding.         ----- Message from Silvia Collins MD sent at 3/14/2019  8:51 AM CDT -----  Please let her know BMD is normal  Thanks

## 2019-03-21 ENCOUNTER — PATIENT OUTREACH (OUTPATIENT)
Dept: OTHER | Facility: OTHER | Age: 69
End: 2019-03-21

## 2019-04-12 ENCOUNTER — PATIENT OUTREACH (OUTPATIENT)
Dept: OTHER | Facility: OTHER | Age: 69
End: 2019-04-12

## 2019-04-12 NOTE — PROGRESS NOTES
"Last 5 Patient Entered Readings                                      Current 30 Day Average: 131/73     Recent Readings 4/7/2019 3/31/2019 3/24/2019 3/16/2019 3/16/2019    SBP (mmHg) 136 123 132 132 140    DBP (mmHg) 78 70 78 66 72    Pulse 104 88 103 86 87        Digital Medicine: Health  Follow Up    Patient reports she has been dealing with "settling her mother's succession" and also dealing with stress from income taxes.  Patient reports she has been stressed and anxious about everything.   Patient is also stressed about driving back from [place] with the weather that is coming in.    Will follow up in 4 weeks.     Lifestyle Modifications:    1.Dietary Modifications (Sodium intake <2,000mg/day, food labels, dining out):   Deferred    2.Physical Activity:   Deferred    3.Medication Therapy:   Patient has been compliant with the medication regimen.    4.Patient has the following medication side effects/concerns: None  (Frequency/Alleviating factors/Precipitating factors, etc.)     Follow up with Ms. Graciela Aranda completed. No further questions or concerns. Will continue to follow up to achieve health goals.  Patient is currently not at goal, 131/73 mmHg does exceed <130/80 mmHg.      "

## 2019-04-15 ENCOUNTER — DOCUMENTATION ONLY (OUTPATIENT)
Dept: OTHER | Facility: OTHER | Age: 69
End: 2019-04-15

## 2019-04-15 NOTE — PROGRESS NOTES
Last 5 Patient Entered Readings                                      Current 30 Day Average: 131/73     Recent Readings 4/7/2019 3/31/2019 3/24/2019 3/16/2019 3/16/2019    SBP (mmHg) 136 123 132 132 140    DBP (mmHg) 78 70 78 66 72    Pulse 104 88 103 86 87        4/15: Patient called stating she received a message from our automated system that she did submit a BP reading in 8 days.  Patient reports she took a reading on 4/14.  After trying to trouble shoot with patient, transferred call to tech support.

## 2019-04-17 ENCOUNTER — PATIENT OUTREACH (OUTPATIENT)
Dept: OTHER | Facility: OTHER | Age: 69
End: 2019-04-17

## 2019-04-17 DIAGNOSIS — I10 HYPERTENSION, ESSENTIAL: ICD-10-CM

## 2019-04-17 RX ORDER — AMLODIPINE BESYLATE 10 MG/1
10 TABLET ORAL DAILY
Qty: 90 TABLET | Refills: 1 | Status: SHIPPED | OUTPATIENT
Start: 2019-04-17 | End: 2019-09-16 | Stop reason: SDUPTHER

## 2019-04-17 NOTE — PROGRESS NOTES
HPI:  Patient called to request refill for amlodipine as she will run out on Tuesday. Reviewed blood pressure readings and current list of medication with patient. She has not started losartan at this time has she still has some lisinopril.      Last 5 Patient Entered Readings                                      Current 30 Day Average: 130/73     Recent Readings 4/14/2019 4/7/2019 3/31/2019 3/24/2019 3/16/2019    SBP (mmHg) 129 136 123 132 132    DBP (mmHg) 67 78 70 78 66    Pulse 84 104 88 103 86          Patient denies s/s of hypotension (lightheadedness, dizziness, nausea, fatigue) associated with low readings. Instructed patient to inform me if this occurs, patient confirms understanding.    Patient denies s/s of hypertension (SOB, CP, severe headaches, changes in vision) associated with high readings. Instructed patient to go to the ED if BP >180/110 and accompanied by hypertensive s/s, patient confirms understanding.    Assessment:  Patient's current 30-day average is right at goal of <130/80 mmHg.    Plan:  Continue current regimen.   Patient will let me know when she begins the new medication and will monitor blood pressure readings more frequently.   Patients health , [facility], will be following up every 3-4 weeks.   I will continue to monitor regularly and will follow-up in 6 weeks, sooner if blood pressure begins to trend upward or downward.     Current medication regimen:  Hypertension Medications             amLODIPine (NORVASC) 10 MG tablet Take 1 tablet (10 mg total) by mouth once daily.    hydroCHLOROthiazide (HYDRODIURIL) 12.5 MG Tab Take 1 tablet (12.5 mg total) by mouth once daily.    losartan (COZAAR) 100 MG tablet Take 1 tablet (100 mg total) by mouth once daily.        Patient has my contact information and knows to call with any concerns or clinical changes.

## 2019-05-07 ENCOUNTER — PATIENT MESSAGE (OUTPATIENT)
Dept: HEMATOLOGY/ONCOLOGY | Facility: CLINIC | Age: 69
End: 2019-05-07

## 2019-05-07 ENCOUNTER — PATIENT OUTREACH (OUTPATIENT)
Dept: OTHER | Facility: OTHER | Age: 69
End: 2019-05-07

## 2019-05-07 ENCOUNTER — TELEPHONE (OUTPATIENT)
Dept: HEMATOLOGY/ONCOLOGY | Facility: CLINIC | Age: 69
End: 2019-05-07

## 2019-05-07 DIAGNOSIS — G62.9 PERIPHERAL POLYNEUROPATHY: Primary | ICD-10-CM

## 2019-05-07 NOTE — PROGRESS NOTES
"HPI:  Returned call to Graciela Aranda. States that she is having foot pain and wanted to know if she can take "Advil". Patient has not started losartan and she continues to take lisinopril. She plans to contact provider to discuss PT.     Last 5 Patient Entered Readings                                      Current 30 Day Average: 129/67     Recent Readings 5/3/2019 4/26/2019 4/26/2019 4/21/2019 4/14/2019    SBP (mmHg) 122 132 131 128 129    DBP (mmHg) 66 65 67 58 67    Pulse 88 88 87 84 84          Patient denies s/s of hypotension (lightheadedness, dizziness, nausea, fatigue) associated with low readings. Instructed patient to inform me if this occurs, patient confirms understanding.    Patient denies s/s of hypertension (SOB, CP, severe headaches, changes in vision) associated with high readings. Instructed patient to go to the ED if BP >180/110 and accompanied by hypertensive s/s, patient confirms understanding.    Assessment:  Patient's current 30-day average is at goal of <130/80 mmHg.    Plan:  Continue current regimen.  Informed patient that Advil does not interact with her medication however, it can cause elevated BP if taking consistently for long periods.  Advised to take Tylenol when possible.   Patients health , [facility], will be following up every 3-4 weeks.   I will continue to monitor regularly and will follow-up in 6 weeks, sooner if blood pressure begins to trend upward or downward.     Current medication regimen:  Hypertension Medications             amLODIPine (NORVASC) 10 MG tablet Take 1 tablet (10 mg total) by mouth once daily.    hydroCHLOROthiazide (HYDRODIURIL) 12.5 MG Tab Take 1 tablet (12.5 mg total) by mouth once daily.    losartan (COZAAR) 100 MG tablet Take 1 tablet (100 mg total) by mouth once daily.        Patient has my contact information and knows to call with any concerns or clinical changes.        "

## 2019-05-07 NOTE — TELEPHONE ENCOUNTER
Communicated with pt via [facility].       ----- Message from Murphy Marino sent at 5/7/2019 11:56 AM CDT -----  Contact: Patient  Pt would like a call from the nurse in regards to getting a referral for physical therapy.      Contact:: [phone]

## 2019-05-09 ENCOUNTER — PATIENT MESSAGE (OUTPATIENT)
Dept: HEMATOLOGY/ONCOLOGY | Facility: CLINIC | Age: 69
End: 2019-05-09

## 2019-05-21 DIAGNOSIS — I10 ESSENTIAL HYPERTENSION: ICD-10-CM

## 2019-05-21 RX ORDER — LISINOPRIL 40 MG/1
TABLET ORAL
Qty: 90 TABLET | Refills: 0 | OUTPATIENT
Start: 2019-05-21

## 2019-05-23 ENCOUNTER — PATIENT OUTREACH (OUTPATIENT)
Dept: OTHER | Facility: OTHER | Age: 69
End: 2019-05-23

## 2019-05-23 NOTE — PROGRESS NOTES
"Last 5 Patient Entered Readings                                      Current 30 Day Average: 126/69     Recent Readings 5/21/2019 5/16/2019 5/9/2019 5/3/2019 4/26/2019    SBP (mmHg) 126 125 124 122 132    DBP (mmHg) 75 69 73 66 65    Pulse 100 99 102 88 88          Digital Medicine: Health  Follow Up    Patient reports her pressures are lower in the mornings.  Patient reports she is "hanging in there".    Lifestyle Modifications:    1.Dietary Modifications (Sodium intake <2,000mg/day, food labels, dining out):   Patient reports no changes and denies needing any other help with nutrition at this time.    2.Physical Activity:   Patient reports she is starting physical therapy for her balance. Patient reports she has been having trouble with her balance because of the neuropathy in her feet.     3.Medication Therapy:   Patient has been compliant with the medication regimen.  Patient reports she will be starting Losartan "next week".    4.Patient has the following medication side effects/concerns (Frequency/Alleviating factors/Precipitating factors, etc.):  Patient reports "the fluid pill" makes her use the restroom a lot.    Follow up with Ms. Graciela Rosi completed. No further questions or concerns. Will continue to follow up to achieve health goals.  Patient is currently at goal, 126/69 mmHg does not exceed <130/80 mmHg.    "

## 2019-06-03 ENCOUNTER — PATIENT OUTREACH (OUTPATIENT)
Dept: OTHER | Facility: OTHER | Age: 69
End: 2019-06-03

## 2019-06-03 ENCOUNTER — PATIENT MESSAGE (OUTPATIENT)
Dept: HEMATOLOGY/ONCOLOGY | Facility: CLINIC | Age: 69
End: 2019-06-03

## 2019-06-03 NOTE — PROGRESS NOTES
HPI:  Ms. Graciela Aranda called to discuss medication regimen. Started losartan last week and has noticed frequent urination with some dizziness. /60 mmHg yesterday and patient did not tolerate lower reading well.     Last 5 Patient Entered Readings                                      Current 30 Day Average: 123/69     Recent Readings 6/1/2019 5/29/2019 5/26/2019 5/21/2019 5/16/2019    SBP (mmHg) 121 115 125 126 125    DBP (mmHg) 67 71 61 75 69    Pulse 86 95 86 100 99        Patient denies s/s of hypertension (SOB, CP, severe headaches, changes in vision) associated with high readings. Instructed patient to go to the ED if BP >180/110 and accompanied by hypertensive s/s, patient confirms understanding.    Assessment:  Patient's current 30-day average is at goal of <130/80 mmHg.     Plan:  Hold hydrochlorothiazide and continue losartan with amlodipine nightly.    Patients health , [facility], will be following up every 3-4 weeks.   I will continue to monitor regularly and will follow-up in 4 weeks, sooner if blood pressure begins to trend upward or downward.     Current medication regimen:  Hypertension Medications             amLODIPine (NORVASC) 10 MG tablet Take 1 tablet (10 mg total) by mouth once daily.    hydroCHLOROthiazide (HYDRODIURIL) 12.5 MG Tab Take 1 tablet (12.5 mg total) by mouth once daily.    losartan (COZAAR) 100 MG tablet Take 1 tablet (100 mg total) by mouth once daily.        Patient has my contact information and knows to call with any concerns or clinical changes.

## 2019-06-11 DIAGNOSIS — I10 HYPERTENSION, ESSENTIAL: ICD-10-CM

## 2019-06-11 RX ORDER — HYDROCHLOROTHIAZIDE 12.5 MG/1
TABLET ORAL
Qty: 90 TABLET | Refills: 0 | Status: SHIPPED | OUTPATIENT
Start: 2019-06-11 | End: 2019-09-16 | Stop reason: SDUPTHER

## 2019-06-11 RX ORDER — LOSARTAN POTASSIUM 100 MG/1
TABLET ORAL
Qty: 90 TABLET | Refills: 0 | Status: SHIPPED | OUTPATIENT
Start: 2019-06-11 | End: 2019-09-16 | Stop reason: SDUPTHER

## 2019-06-11 RX ORDER — ATORVASTATIN CALCIUM 20 MG/1
TABLET, FILM COATED ORAL
Qty: 90 TABLET | Refills: 0 | Status: SHIPPED | OUTPATIENT
Start: 2019-06-11 | End: 2019-09-16 | Stop reason: SDUPTHER

## 2019-07-01 ENCOUNTER — PATIENT OUTREACH (OUTPATIENT)
Dept: OTHER | Facility: OTHER | Age: 69
End: 2019-07-01

## 2019-07-01 NOTE — PROGRESS NOTES
"HPI:  Called Ms. Graciela Aranda for hypertension digital medicine follow-up. Stopped hydrochlorothiazide for 1 week and resumed because BP was higher than she "liked". She feels well with current regimen and has no questions or concerns at this time. Patient will be out next week.     Last 5 Patient Entered Readings                                      Current 30 Day Average: 127/71     Recent Readings 6/28/2019 6/24/2019 6/24/2019 6/19/2019 6/14/2019    SBP (mmHg) 120 130 138 118 112    DBP (mmHg) 71 68 74 71 63    Pulse 82 86 82 99 83          Patient denies s/s of hypotension (lightheadedness, dizziness, nausea, fatigue) associated with low readings. Instructed patient to inform me if this occurs, patient confirms understanding.    Patient denies s/s of hypertension (SOB, CP, severe headaches, changes in vision) associated with high readings. Instructed patient to go to the ED if BP >180/110 and accompanied by hypertensive s/s, patient confirms understanding.    Assessment:  Patient's current 30-day average is at goal of <130/80 mmHg.       Plan:  Continue current regimen.   Patients health , [facility], will follow-up as scheduled.    I will continue to monitor regularly and will follow-up in 12 weeks, sooner if blood pressure begins to trend upward or downward.     Current medication regimen:  Hypertension Medications             amLODIPine (NORVASC) 10 MG tablet Take 1 tablet (10 mg total) by mouth once daily.    hydroCHLOROthiazide (HYDRODIURIL) 12.5 MG Tab TAKE 1 TABLET(12.5 MG) BY MOUTH EVERY DAY    losartan (COZAAR) 100 MG tablet TAKE 1 TABLET(100 MG) BY MOUTH EVERY DAY        Patient has my contact information and knows to call with any concerns or clinical changes.      "

## 2019-07-18 ENCOUNTER — PATIENT OUTREACH (OUTPATIENT)
Dept: OTHER | Facility: OTHER | Age: 69
End: 2019-07-18

## 2019-07-18 NOTE — PROGRESS NOTES
Last 5 Patient Entered Readings                                      Current 30 Day Average: 120/69     Recent Readings 7/16/2019 7/11/2019 7/6/2019 7/2/2019 6/28/2019    SBP (mmHg) 110 120 127 116 120    DBP (mmHg) 59 68 70 68 71    Pulse 85 84 96 94 82        Digital Medicine: Health  Follow Up    Patient reports she is doing well.  BP is well controlled.  Patient is grateful for the follow up calls.     Lifestyle Modifications:    1.Dietary Modifications (Sodium intake <2,000mg/day, food labels, dining out):   Patient reports no changes and denies needing any other help with nutrition at this time.    2.Physical Activity:   Patient reports her physical therapy has been working and think it is effective.  Patient reports she has been working on her balance and on an old ankle injury.  Patient reports she has been doing some of the exercises at home as well.  Gave encouragement to patient.      3.Medication Therapy:   Patient has been compliant with the medication regimen.    4.Patient has the following medication side effects/concerns: None  (Frequency/Alleviating factors/Precipitating factors, etc.)     Follow up with MsTd Graciela Teteskylar completed. No further questions or concerns. Will continue to follow up to achieve health goals.  Patient is currently at goal, 120/69 mmHg does not exceed <130/80 mmHg.

## 2019-08-23 NOTE — PROGRESS NOTES
Subjective:       Patient ID: Graciela Aranda is a 68 y.o. female.    Chief Complaint: Malignant neoplasm of upper-outer quadrant of left breast in    HPI     This is a 68 year old female with T2N1 ER/OH + left breast cancer, currently on adjuvant Arimidex, here for follow up.       +fatigue in evenings.   +peripheral neuropathy about the same- PT helping with balance.   No bone pain or other pain issues.   2 recent deaths- cousin and close friend.   No new complaints.   Stressed when she comes to her appts.   Tolerating Arimidex well.  No musculoskeletal complaints.   Continues to smoke  She is not taking calcium supplement due to past hypercalcemia but remains on Vitamin D.  BP meds recently switched.         BMD 3/12/19:  Impression       1.  Normal bone mineralization of the lumbar spine.    2.  Normal bone mineralization of the hips bilaterally.           Diagnosis:This is a 68 year old female with T2N1 left breast cancer (ER/OH positive, HER-2 negative) history outlined below and currently on adjuvant Arimidex.  Oncology History:  The patient is a 68-year-old white female with left invasive breast cancer (T2 N1 M0) ER+, OH+, Her2 negative with positive sentinel lymph node from left axilla who completed 4 cycles of AC and 4 cycles of Taxol in the neoadjuvant setting.  Post bilateral mastectomy on 05/29/2015 with Dr. Monte.. Her final pathology revealed:  The left breast had a 2.2 cm of residual invasive ductal carcinoma of overall grade 2 disease. Surgical margins were negative.  The right breast revealed no evidence of invasive carcinoma. +ADH  There were six left sentinel lymph nodes sampled within four sentinel lymph node specimens;  three of these lymph nodes were positive, all of which were noted intraoperatively to be positive. The sizes of the metastatic foci were 3 mm, 2    mm and 3 mm respectively.   She had been consented for the NSABP B-51, an [place] trial with the positive lymph node  intraoperatively. She was randomized to no    axillary lymph node dissection and will be proceeding with adjuvant left axillary radiotherapy.  She was initially scheduled for a left total complete mastectomy without reconstruction and with sentinel lymph node biopsy 11/03/2014. But the preop MRI, revealed a contralateral suspicious right-sided abnormality located at the 3 o'clock position of the right breast. The MRI also revealed a suspicious lymph node in the left axilla. We obtained target ultrasound of both areas, which confirmed the areas of abnormality in both the right medial breast at the 3 o'clock position as well as a 15 mm left axillary lymph node.She has undergone ultrasound-guided core needle biopsy of the right medial breast mass as well as the left axillary lymph node.   The pathology from right breast mass show fibroadenoma but axillary lymph nodes is positive for involvement with adenocarcinoma ER/WI positive, HER-2/anderson negative tumor.    Her left-sided breast cancer also revealed an ER/WI positive, HER-2/anderson negative tumor that was approximately 5 cm on clinical presentation and on MRI, this mass also measured 41 mm in the middle lower outer region of the left breast located 5 cm from the nipple.       Health maintenance:  BMD 1/2017  Lumbar Spine: Lumbar bone mineral density L1-L4 is 1.190g/cm2, which is a t-score of 1.3. The z-score is 3.2.  Total Hip: The total hip bone mineral density is 0.872g/cm2.  The t-score is -0.6, and the z-score is 0.8.  Femoral neck BMD is 0.770g/cm2 and the t-score is -0.7.  COMPARISONS:  Date Location BMD T-score  10/08/15 L-spine 1.179 1.2  Total Hip 0.873 -0.6  Impression   Elevated BMD of the lumbar spine. No significant change since prior study.  Recommendations:  1) Adequate calcium and Vitamin D therapy  2) Appropriate exercise  3) Consider x-ray of lumbar spine to evaluate elevated Z score of 3.2.  4) Stop smoking  5) Consider repeat BMD in 4  years     Colonoscopy - Had her colonoscopy in the interval which revealed 1 - 3mm polyp that was removed, hyperplastic on pathology. Repeat recommended in 5 years (2021).    Review of Systems   Constitutional: Positive for fatigue. Negative for activity change, appetite change, chills, diaphoresis, fever and unexpected weight change.   HENT: Negative for congestion, dental problem, ear pain, hearing loss, mouth sores, nosebleeds, rhinorrhea, sinus pressure, sinus pain, sneezing, sore throat, tinnitus and trouble swallowing.    Eyes: Negative for redness and visual disturbance.   Respiratory: Negative for cough, chest tightness, shortness of breath and wheezing.    Cardiovascular: Negative for chest pain, palpitations and leg swelling.   Gastrointestinal: Negative for abdominal distention, abdominal pain, blood in stool, constipation, diarrhea, nausea and vomiting.   Genitourinary: Negative for decreased urine volume, difficulty urinating, dysuria, frequency, hematuria and urgency.   Musculoskeletal: Negative for arthralgias, back pain, gait problem, joint swelling and neck pain.   Skin: Negative for color change, pallor, rash and wound.   Neurological: Positive for numbness (and tingling in feet since chemo). Negative for dizziness, weakness, light-headedness and headaches.   Hematological: Negative for adenopathy. Does not bruise/bleed easily.   Psychiatric/Behavioral: Negative for dysphoric mood and sleep disturbance. The patient is nervous/anxious.        Objective:      Physical Exam   Constitutional: She is oriented to person, place, and time. She appears well-developed and well-nourished. No distress.   Presents alone   HENT:   Head: Normocephalic and atraumatic.   Right Ear: External ear normal.   Left Ear: External ear normal.   Nose: Nose normal.   Mouth/Throat: Oropharynx is clear and moist. No oropharyngeal exudate.   Eyes: Pupils are equal, round, and reactive to light. Conjunctivae and EOM are normal.  Right eye exhibits no discharge. Left eye exhibits no discharge. No scleral icterus.   Neck: Normal range of motion. Neck supple. No tracheal deviation present. No thyromegaly present.   Cardiovascular: Normal rate, regular rhythm, normal heart sounds and intact distal pulses. Exam reveals no gallop and no friction rub.   No murmur heard.  Pulmonary/Chest: Effort normal and breath sounds normal. No respiratory distress. She has no wheezes. She has no rales. She exhibits no tenderness. Right breast exhibits no skin change. Left breast exhibits no mass, no skin change and no tenderness. Breasts are symmetrical.   Post bilateral mastectomy without evidence of chest wall recurrence  No lymphadenopathy   Abdominal: Soft. Bowel sounds are normal. She exhibits no distension and no mass. There is no tenderness. There is no rebound and no guarding.   No organomegaly   Musculoskeletal: Normal range of motion. She exhibits no edema, tenderness or deformity.   Lymphadenopathy:     She has no cervical adenopathy.   Neurological: She is alert and oriented to person, place, and time. No cranial nerve deficit. She exhibits normal muscle tone. Coordination normal.   Skin: Skin is warm and dry. No rash noted. She is not diaphoretic. No erythema. No pallor.   Psychiatric: She has a normal mood and affect. Her behavior is normal. Judgment and thought content normal.   Nursing note and vitals reviewed.      Results for orders placed or performed in visit on 08/27/19   CBC W/ AUTO DIFFERENTIAL   Result Value Ref Range    WBC 8.74 3.90 - 12.70 K/uL    RBC 4.15 4.00 - 5.40 M/uL    Hemoglobin 14.4 12.0 - 16.0 g/dL    Hematocrit 43.6 37.0 - 48.5 %    Mean Corpuscular Volume 105 (H) 82 - 98 fL    Mean Corpuscular Hemoglobin 34.7 (H) 27.0 - 31.0 pg    Mean Corpuscular Hemoglobin Conc 33.0 32.0 - 36.0 g/dL    RDW 13.7 11.5 - 14.5 %    Platelets 191 150 - 350 K/uL    MPV 10.0 9.2 - 12.9 fL    Immature Granulocytes 0.6 (H) 0.0 - 0.5 %    Gran #  (ANC) 6.7 1.8 - 7.7 K/uL    Immature Grans (Abs) 0.05 (H) 0.00 - 0.04 K/uL    Lymph # 1.3 1.0 - 4.8 K/uL    Mono # 0.6 0.3 - 1.0 K/uL    Eos # 0.1 0.0 - 0.5 K/uL    Baso # 0.05 0.00 - 0.20 K/uL    nRBC 0 0 /100 WBC    Gran% 76.6 (H) 38.0 - 73.0 %    Lymph% 15.0 (L) 18.0 - 48.0 %    Mono% 6.4 4.0 - 15.0 %    Eosinophil% 0.8 0.0 - 8.0 %    Basophil% 0.6 0.0 - 1.9 %    Differential Method Automated    Comprehensive metabolic panel   Result Value Ref Range    Sodium 138 136 - 145 mmol/L    Potassium 4.6 3.5 - 5.1 mmol/L    Chloride 102 95 - 110 mmol/L    CO2 24 23 - 29 mmol/L    Glucose 127 (H) 70 - 110 mg/dL    BUN, Bld 20 8 - 23 mg/dL    Creatinine 1.2 0.5 - 1.4 mg/dL    Calcium 11.1 (H) 8.7 - 10.5 mg/dL    Total Protein 8.0 6.0 - 8.4 g/dL    Albumin 4.8 3.5 - 5.2 g/dL    Total Bilirubin 0.7 0.1 - 1.0 mg/dL    Alkaline Phosphatase 92 55 - 135 U/L    AST 42 (H) 10 - 40 U/L    ALT 69 (H) 10 - 44 U/L    Anion Gap 12 8 - 16 mmol/L    eGFR if African American 53.3 (A) >60 mL/min/1.73 m^2    eGFR if non  46.2 (A) >60 mL/min/1.73 m^2   Vitamin D   Result Value Ref Range    Vit D, 25-Hydroxy 56 30 - 96 ng/mL     Corrected calcium   10.5 mg/dL      Assessment:       1. Malignant neoplasm of upper-outer quadrant of left breast in female, estrogen receptor positive    2. S/P bilateral mastectomy    3. Vitamin D deficiency    4. Aromatase inhibitor use        Plan:       Doing well, ANU clinically.   CBC reviewed.   Continue Arimidex.   RTC 6 months to see Dr. Collins with CBC, CMP.   BMD reviewed. Repeat 3/2021.  Continue to follow up with PCP for other health maintenance.        At the time patient left, CMP and Vit d were pending. Patient asked to be messaged via portal.   CMP reviewed. Corrected calcium normal. Liver enzymes slightly elevated- will message PCP for further workup. Recent medication changes noted.       Addendum:  MD Arnel George NP             Will do, thank you     Previous Messages      ----- Message -----   From: Arnel Barksdale NP   Sent: 8/27/2019  12:32 PM   To: Moy Patel MD     Hi there! Patient had CMP today that revealed slightly elevated enzymes. She tells me that BP medication changes were made recently. Not sure if this would be the reasoning but Shweta advised she follow up with you for repeat.   Calcium elevated but corrected is normal.   Thanks,   PJ           Patient is in agreement with the proposed treatment plan. All questions were answered to the patient's satisfaction. Pt knows to call clinic for any new or worsening symptoms and if anything is needed before the next clinic visit.      Arnel Barksdale, ALECP-C  Hematology & Oncology  [facility] [place] [address]  ph. [phone]  Fax. [phone]     I spent 30 minutes (face to face) with the patient, more than 50% was in counseling and coordination of care as detailed above.

## 2019-08-27 ENCOUNTER — LAB VISIT (OUTPATIENT)
Dept: LAB | Facility: HOSPITAL | Age: 69
End: 2019-08-27
Attending: INTERNAL MEDICINE
Payer: MEDICARE

## 2019-08-27 ENCOUNTER — OFFICE VISIT (OUTPATIENT)
Dept: HEMATOLOGY/ONCOLOGY | Facility: CLINIC | Age: 69
End: 2019-08-27
Payer: MEDICARE

## 2019-08-27 VITALS
OXYGEN SATURATION: 100 % | WEIGHT: 148.38 LBS | TEMPERATURE: 99 F | BODY MASS INDEX: 22.49 KG/M2 | HEART RATE: 88 BPM | RESPIRATION RATE: 18 BRPM | DIASTOLIC BLOOD PRESSURE: 70 MMHG | HEIGHT: 68 IN | SYSTOLIC BLOOD PRESSURE: 149 MMHG

## 2019-08-27 DIAGNOSIS — E55.9 VITAMIN D DEFICIENCY: ICD-10-CM

## 2019-08-27 DIAGNOSIS — C50.412 MALIGNANT NEOPLASM OF UPPER-OUTER QUADRANT OF LEFT BREAST IN FEMALE, ESTROGEN RECEPTOR POSITIVE: Primary | ICD-10-CM

## 2019-08-27 DIAGNOSIS — C50.412 MALIGNANT NEOPLASM OF UPPER-OUTER QUADRANT OF LEFT BREAST IN FEMALE, ESTROGEN RECEPTOR POSITIVE: ICD-10-CM

## 2019-08-27 DIAGNOSIS — Z90.13 S/P BILATERAL MASTECTOMY: ICD-10-CM

## 2019-08-27 DIAGNOSIS — Z17.0 MALIGNANT NEOPLASM OF UPPER-OUTER QUADRANT OF LEFT BREAST IN FEMALE, ESTROGEN RECEPTOR POSITIVE: Primary | ICD-10-CM

## 2019-08-27 DIAGNOSIS — Z79.811 AROMATASE INHIBITOR USE: ICD-10-CM

## 2019-08-27 DIAGNOSIS — Z17.0 MALIGNANT NEOPLASM OF UPPER-OUTER QUADRANT OF LEFT BREAST IN FEMALE, ESTROGEN RECEPTOR POSITIVE: ICD-10-CM

## 2019-08-27 LAB
25(OH)D3+25(OH)D2 SERPL-MCNC: 56 NG/ML (ref 30–96)
ALBUMIN SERPL BCP-MCNC: 4.8 G/DL (ref 3.5–5.2)
ALP SERPL-CCNC: 92 U/L (ref 55–135)
ALT SERPL W/O P-5'-P-CCNC: 69 U/L (ref 10–44)
ANION GAP SERPL CALC-SCNC: 12 MMOL/L (ref 8–16)
AST SERPL-CCNC: 42 U/L (ref 10–40)
BASOPHILS # BLD AUTO: 0.05 K/UL (ref 0–0.2)
BASOPHILS NFR BLD: 0.6 % (ref 0–1.9)
BILIRUB SERPL-MCNC: 0.7 MG/DL (ref 0.1–1)
BUN SERPL-MCNC: 20 MG/DL (ref 8–23)
CALCIUM SERPL-MCNC: 11.1 MG/DL (ref 8.7–10.5)
CHLORIDE SERPL-SCNC: 102 MMOL/L (ref 95–110)
CO2 SERPL-SCNC: 24 MMOL/L (ref 23–29)
CREAT SERPL-MCNC: 1.2 MG/DL (ref 0.5–1.4)
DIFFERENTIAL METHOD: ABNORMAL
EOSINOPHIL # BLD AUTO: 0.1 K/UL (ref 0–0.5)
EOSINOPHIL NFR BLD: 0.8 % (ref 0–8)
ERYTHROCYTE [DISTWIDTH] IN BLOOD BY AUTOMATED COUNT: 13.7 % (ref 11.5–14.5)
EST. GFR  (AFRICAN AMERICAN): 53.3 ML/MIN/1.73 M^2
EST. GFR  (NON AFRICAN AMERICAN): 46.2 ML/MIN/1.73 M^2
GLUCOSE SERPL-MCNC: 127 MG/DL (ref 70–110)
HCT VFR BLD AUTO: 43.6 % (ref 37–48.5)
HGB BLD-MCNC: 14.4 G/DL (ref 12–16)
IMM GRANULOCYTES # BLD AUTO: 0.05 K/UL (ref 0–0.04)
IMM GRANULOCYTES NFR BLD AUTO: 0.6 % (ref 0–0.5)
LYMPHOCYTES # BLD AUTO: 1.3 K/UL (ref 1–4.8)
LYMPHOCYTES NFR BLD: 15 % (ref 18–48)
MCH RBC QN AUTO: 34.7 PG (ref 27–31)
MCHC RBC AUTO-ENTMCNC: 33 G/DL (ref 32–36)
MCV RBC AUTO: 105 FL (ref 82–98)
MONOCYTES # BLD AUTO: 0.6 K/UL (ref 0.3–1)
MONOCYTES NFR BLD: 6.4 % (ref 4–15)
NEUTROPHILS # BLD AUTO: 6.7 K/UL (ref 1.8–7.7)
NEUTROPHILS NFR BLD: 76.6 % (ref 38–73)
NRBC BLD-RTO: 0 /100 WBC
PLATELET # BLD AUTO: 191 K/UL (ref 150–350)
PMV BLD AUTO: 10 FL (ref 9.2–12.9)
POTASSIUM SERPL-SCNC: 4.6 MMOL/L (ref 3.5–5.1)
PROT SERPL-MCNC: 8 G/DL (ref 6–8.4)
RBC # BLD AUTO: 4.15 M/UL (ref 4–5.4)
SODIUM SERPL-SCNC: 138 MMOL/L (ref 136–145)
WBC # BLD AUTO: 8.74 K/UL (ref 3.9–12.7)

## 2019-08-27 PROCEDURE — 36415 COLL VENOUS BLD VENIPUNCTURE: CPT

## 2019-08-27 PROCEDURE — 99214 PR OFFICE/OUTPT VISIT, EST, LEVL IV, 30-39 MIN: ICD-10-PCS | Mod: S$PBB,,, | Performed by: NURSE PRACTITIONER

## 2019-08-27 PROCEDURE — 99999 PR PBB SHADOW E&M-EST. PATIENT-LVL III: CPT | Mod: PBBFAC,,, | Performed by: NURSE PRACTITIONER

## 2019-08-27 PROCEDURE — 99214 OFFICE O/P EST MOD 30 MIN: CPT | Mod: S$PBB,,, | Performed by: NURSE PRACTITIONER

## 2019-08-27 PROCEDURE — 85025 COMPLETE CBC W/AUTO DIFF WBC: CPT

## 2019-08-27 PROCEDURE — 82306 VITAMIN D 25 HYDROXY: CPT

## 2019-08-27 PROCEDURE — 80053 COMPREHEN METABOLIC PANEL: CPT

## 2019-08-27 PROCEDURE — 99999 PR PBB SHADOW E&M-EST. PATIENT-LVL III: ICD-10-PCS | Mod: PBBFAC,,, | Performed by: NURSE PRACTITIONER

## 2019-08-27 PROCEDURE — 99213 OFFICE O/P EST LOW 20 MIN: CPT | Mod: PBBFAC | Performed by: NURSE PRACTITIONER

## 2019-08-27 NOTE — Clinical Note
Hi there! Patient had CMP today that revealed slightly elevated enzymes. She tells me that BP medication changes were made recently. Not sure if this would be the reasoning but Shweta advised she follow up with you for repeat. Calcium elevated but corrected is normal. Thanks, WILLIAM

## 2019-08-28 ENCOUNTER — PATIENT MESSAGE (OUTPATIENT)
Dept: HEMATOLOGY/ONCOLOGY | Facility: CLINIC | Age: 69
End: 2019-08-28

## 2019-09-03 ENCOUNTER — TELEPHONE (OUTPATIENT)
Dept: INTERNAL MEDICINE | Facility: CLINIC | Age: 69
End: 2019-09-03

## 2019-09-03 DIAGNOSIS — E78.5 HYPERLIPIDEMIA, UNSPECIFIED HYPERLIPIDEMIA TYPE: ICD-10-CM

## 2019-09-03 DIAGNOSIS — R94.5 ABNORMAL RESULTS OF LIVER FUNCTION STUDIES: ICD-10-CM

## 2019-09-03 DIAGNOSIS — Z00.00 ANNUAL PHYSICAL EXAM: Primary | ICD-10-CM

## 2019-09-03 NOTE — TELEPHONE ENCOUNTER
Spoke to pt and scheduled the lab appt for next Tue. Pt said she may have to re-schedule the labs since [place] now.

## 2019-09-03 NOTE — TELEPHONE ENCOUNTER
Patient has an existing appointment next week - See lab orders and please call patient to schedule labs before appointment. Thank you

## 2019-09-13 ENCOUNTER — OFFICE VISIT (OUTPATIENT)
Dept: INTERNAL MEDICINE | Facility: CLINIC | Age: 69
End: 2019-09-13
Attending: FAMILY MEDICINE
Payer: MEDICARE

## 2019-09-13 VITALS
OXYGEN SATURATION: 97 % | HEART RATE: 90 BPM | BODY MASS INDEX: 21.88 KG/M2 | SYSTOLIC BLOOD PRESSURE: 134 MMHG | WEIGHT: 144.38 LBS | TEMPERATURE: 99 F | HEIGHT: 68 IN | DIASTOLIC BLOOD PRESSURE: 80 MMHG

## 2019-09-13 DIAGNOSIS — G62.0 NEUROPATHY DUE TO CHEMOTHERAPEUTIC DRUG: ICD-10-CM

## 2019-09-13 DIAGNOSIS — E78.5 HYPERLIPIDEMIA, UNSPECIFIED HYPERLIPIDEMIA TYPE: ICD-10-CM

## 2019-09-13 DIAGNOSIS — C50.412 MALIGNANT NEOPLASM OF UPPER-OUTER QUADRANT OF LEFT BREAST IN FEMALE, ESTROGEN RECEPTOR POSITIVE: ICD-10-CM

## 2019-09-13 DIAGNOSIS — F17.200 SMOKER: ICD-10-CM

## 2019-09-13 DIAGNOSIS — I10 HYPERTENSION, ESSENTIAL: ICD-10-CM

## 2019-09-13 DIAGNOSIS — E55.9 VITAMIN D DEFICIENCY: ICD-10-CM

## 2019-09-13 DIAGNOSIS — Z17.0 MALIGNANT NEOPLASM OF UPPER-OUTER QUADRANT OF LEFT BREAST IN FEMALE, ESTROGEN RECEPTOR POSITIVE: ICD-10-CM

## 2019-09-13 DIAGNOSIS — Z79.811 AROMATASE INHIBITOR USE: ICD-10-CM

## 2019-09-13 DIAGNOSIS — M85.80 OSTEOPENIA, UNSPECIFIED LOCATION: ICD-10-CM

## 2019-09-13 DIAGNOSIS — T45.1X5A NEUROPATHY DUE TO CHEMOTHERAPEUTIC DRUG: ICD-10-CM

## 2019-09-13 DIAGNOSIS — R35.0 URINARY FREQUENCY: Primary | ICD-10-CM

## 2019-09-13 DIAGNOSIS — R94.5 ABNORMAL RESULTS OF LIVER FUNCTION STUDIES: ICD-10-CM

## 2019-09-13 LAB
BILIRUB UR QL STRIP: NEGATIVE
CLARITY UR REFRACT.AUTO: CLEAR
COLOR UR AUTO: YELLOW
GLUCOSE UR QL STRIP: NEGATIVE
HGB UR QL STRIP: NEGATIVE
KETONES UR QL STRIP: NEGATIVE
LEUKOCYTE ESTERASE UR QL STRIP: NEGATIVE
MICROSCOPIC COMMENT: NORMAL
NITRITE UR QL STRIP: NEGATIVE
PH UR STRIP: 6 [PH] (ref 5–8)
PROT UR QL STRIP: NEGATIVE
RBC #/AREA URNS AUTO: 0 /HPF (ref 0–4)
SP GR UR STRIP: 1.01 (ref 1–1.03)
SQUAMOUS #/AREA URNS AUTO: 1 /HPF
URN SPEC COLLECT METH UR: NORMAL
WBC #/AREA URNS AUTO: 0 /HPF (ref 0–5)

## 2019-09-13 PROCEDURE — 99214 PR OFFICE/OUTPT VISIT, EST, LEVL IV, 30-39 MIN: ICD-10-PCS | Mod: S$PBB,,, | Performed by: FAMILY MEDICINE

## 2019-09-13 PROCEDURE — 99999 PR PBB SHADOW E&M-EST. PATIENT-LVL III: ICD-10-PCS | Mod: PBBFAC,,, | Performed by: FAMILY MEDICINE

## 2019-09-13 PROCEDURE — 87086 URINE CULTURE/COLONY COUNT: CPT

## 2019-09-13 PROCEDURE — 99999 PR PBB SHADOW E&M-EST. PATIENT-LVL III: CPT | Mod: PBBFAC,,, | Performed by: FAMILY MEDICINE

## 2019-09-13 PROCEDURE — 99214 OFFICE O/P EST MOD 30 MIN: CPT | Mod: S$PBB,,, | Performed by: FAMILY MEDICINE

## 2019-09-13 PROCEDURE — 81001 URINALYSIS AUTO W/SCOPE: CPT

## 2019-09-13 PROCEDURE — 99213 OFFICE O/P EST LOW 20 MIN: CPT | Mod: PBBFAC | Performed by: FAMILY MEDICINE

## 2019-09-13 NOTE — PROGRESS NOTES
Subjective:       Patient ID: Graciela Aranda is a 68 y.o. female.    Chief Complaint: Follow-up    HPI  Review of Systems   Constitutional: Negative for chills, fatigue, fever and unexpected weight change.   HENT: Negative for congestion and trouble swallowing.    Eyes: Negative for redness and visual disturbance.   Respiratory: Negative for cough, chest tightness and shortness of breath.    Cardiovascular: Negative for chest pain, palpitations and leg swelling.   Gastrointestinal: Negative for abdominal pain and blood in stool.   Genitourinary: Positive for frequency. Negative for difficulty urinating, hematuria and menstrual problem.   Musculoskeletal: Negative for arthralgias, back pain, gait problem, joint swelling, myalgias and neck pain.   Skin: Negative for color change and rash.   Neurological: Positive for weakness and numbness. Negative for tremors, speech difficulty and headaches.   Hematological: Negative for adenopathy. Does not bruise/bleed easily.   Psychiatric/Behavioral: Negative for behavioral problems, confusion and sleep disturbance. The patient is not nervous/anxious.        Objective:      Physical Exam   Constitutional: She is oriented to person, place, and time. She appears well-developed and well-nourished.   HENT:   Head: Normocephalic and atraumatic.   Eyes: Conjunctivae are normal. No scleral icterus.   Neck: Normal range of motion. Neck supple.   Cardiovascular: Normal rate, normal heart sounds and intact distal pulses. Exam reveals no gallop and no friction rub.   No murmur heard.  Pulmonary/Chest: Effort normal and breath sounds normal. No respiratory distress. She has no wheezes. She has no rales.   Abdominal: She exhibits no distension and no abdominal bruit. There is no tenderness.   Musculoskeletal: She exhibits no edema or deformity.   Neurological: She is alert and oriented to person, place, and time. She displays no tremor. No cranial nerve deficit. Coordination and gait normal.    Skin: Skin is warm and dry. No rash noted. She is not diaphoretic. No erythema.   Psychiatric: She has a normal mood and affect. Her behavior is normal. Judgment and thought content normal.   Nursing note and vitals reviewed.      Assessment:       1. Urinary frequency    2. Hypertension, essential    3. Hyperlipidemia, unspecified hyperlipidemia type    4. Malignant neoplasm of upper-outer quadrant of left breast in female, estrogen receptor positive    5. Osteopenia, unspecified location    6. Vitamin D deficiency    7. Aromatase inhibitor use    8. Abnormal results of liver function studies    9. Smoker    10. Neuropathy due to chemotherapeutic drug        Plan:     Medication List with Changes/Refills   Current Medications    AMLODIPINE (NORVASC) 10 MG TABLET    Take 1 tablet (10 mg total) by mouth once daily.    ANASTROZOLE (ARIMIDEX) 1 MG TAB    TAKE 1 TABLET(1 MG) BY MOUTH EVERY DAY    ATORVASTATIN (LIPITOR) 20 MG TABLET    TAKE 1 TABLET(20 MG) BY MOUTH EVERY DAY    EPINEPHRINE (EPIPEN) 0.3 MG/0.3 ML ATIN    Inject 0.3 mLs (0.3 mg total) into the muscle daily as needed.    HYDROCHLOROTHIAZIDE (HYDRODIURIL) 12.5 MG TAB    TAKE 1 TABLET(12.5 MG) BY MOUTH EVERY DAY    LOSARTAN (COZAAR) 100 MG TABLET    TAKE 1 TABLET(100 MG) BY MOUTH EVERY DAY     Graciela was seen today for follow-up.    Diagnoses and all orders for this visit:    Urinary frequency  -     Urinalysis  -     Urinalysis Microscopic  -     Urine culture    Hypertension, essential    Hyperlipidemia, unspecified hyperlipidemia type    Malignant neoplasm of upper-outer quadrant of left breast in female, estrogen receptor positive    Osteopenia, unspecified location    Vitamin D deficiency    Aromatase inhibitor use    Abnormal results of liver function studies  Comments:  resolved    Smoker  Comments:  declines LDCT    Neuropathy due to chemotherapeutic drug      See meds, orders, follow up, routing and instructions sections of encounter.  A 68-year-old  established female patient.  She is here for essentially an annual   physical examination.  She is complaining of decreased range of motion to the   left shoulder on review of systems.  We do relate this to her bilateral   mastectomy.  She [facility] follow[facility] with [facility] without fail.  She had two   recent events.  The death of a friend/neighbor in [facility] and also   the death of a cousin, both of these were due to complications regarding heart   rhythm or pacemaker placement.  She likes to watch old movies.  She does have   some friends in [place] that she sees every three weeks or so.  I did discuss   webb opportunities with her.  She does swim three times a week and she   declines physical therapy at this time to look at the range of motion with her   shoulder, etc.  She would like to follow up in one year.  Please see laboratory.      TEE/JOHANNA  dd: 09/13/2019 16:33:49 (CDT)  td: 09/14/2019 13:17:33 (CDT)  Doc ID   #[id number]  Job ID #[id number]    CC:

## 2019-09-14 LAB — BACTERIA UR CULT: NORMAL

## 2019-09-16 ENCOUNTER — PATIENT OUTREACH (OUTPATIENT)
Dept: OTHER | Facility: OTHER | Age: 69
End: 2019-09-16

## 2019-09-16 DIAGNOSIS — I10 HYPERTENSION, ESSENTIAL: ICD-10-CM

## 2019-09-16 RX ORDER — AMLODIPINE BESYLATE 10 MG/1
10 TABLET ORAL DAILY
Qty: 90 TABLET | Refills: 1 | Status: SHIPPED | OUTPATIENT
Start: 2019-09-16 | End: 2020-03-10

## 2019-09-16 RX ORDER — HYDROCHLOROTHIAZIDE 12.5 MG/1
TABLET ORAL
Qty: 90 TABLET | Refills: 0 | Status: SHIPPED | OUTPATIENT
Start: 2019-09-16 | End: 2019-12-19 | Stop reason: SDUPTHER

## 2019-09-16 RX ORDER — LOSARTAN POTASSIUM 100 MG/1
TABLET ORAL
Qty: 90 TABLET | Refills: 0 | Status: SHIPPED | OUTPATIENT
Start: 2019-09-16 | End: 2019-11-18 | Stop reason: SDUPTHER

## 2019-09-16 RX ORDER — ATORVASTATIN CALCIUM 20 MG/1
20 TABLET, FILM COATED ORAL DAILY
Qty: 90 TABLET | Refills: 1 | Status: SHIPPED | OUTPATIENT
Start: 2019-09-16 | End: 2020-03-10

## 2019-09-16 RX ORDER — ATORVASTATIN CALCIUM 20 MG/1
TABLET, FILM COATED ORAL
Qty: 90 TABLET | Refills: 0 | Status: SHIPPED | OUTPATIENT
Start: 2019-09-16 | End: 2020-03-10 | Stop reason: SDUPTHER

## 2019-09-16 NOTE — TELEPHONE ENCOUNTER
----- Message from Danette mSith PharmD sent at 9/16/2019 11:34 AM CDT -----  Hi Dr. rBiceño,      Patient requesting refill for atorvastatin 20 mg.    Thanks!    Danette Smith, PharmD  Digital Medicine Clinical Pharmacist   [phone]

## 2019-09-16 NOTE — PROGRESS NOTES
Digital Medicine: Clinician Follow-Up    Called patient for follow-up. She reports adherence to medication with no complaints. Requested refill for atorvastatin.     The history is provided by the patient. No  was used.     Follow Up  Follow-up reason(s): routine education          Assessment/Plan  Current 30-day average is at goal of <130/80 mmHg.    Intervention/Plan  Continue current regimen.  Follow-up in 3-6 months.    There are no preventive care reminders to display for this patient.    Last 5 Patient Entered Readings                                      Current 30 Day Average: 118/65     Recent Readings 9/15/2019 9/11/2019 9/6/2019 9/1/2019 8/28/2019    SBP (mmHg) 114 122 124 114 116    DBP (mmHg) 63 63 68 67 65    Pulse 80 77 82 80 95             Hypertension Medications             amLODIPine (NORVASC) 10 MG tablet Take 1 tablet (10 mg total) by mouth once daily.    hydroCHLOROthiazide (HYDRODIURIL) 12.5 MG Tab TAKE 1 TABLET(12.5 MG) BY MOUTH EVERY DAY    losartan (COZAAR) 100 MG tablet TAKE 1 TABLET(100 MG) BY MOUTH EVERY DAY

## 2019-09-25 ENCOUNTER — PATIENT OUTREACH (OUTPATIENT)
Dept: OTHER | Facility: OTHER | Age: 69
End: 2019-09-25

## 2019-09-25 NOTE — PROGRESS NOTES
Called patient for follow-up on  regarding losartan recall. Informed patient that new recall included products from [employer]. Asked her to contact dispensing pharmacy for specific information regarding her  ad lot number and to contact me if an alternative is needed. Patient verbalized understanding.

## 2019-10-18 ENCOUNTER — PATIENT OUTREACH (OUTPATIENT)
Dept: OTHER | Facility: OTHER | Age: 69
End: 2019-10-18

## 2019-10-18 NOTE — PROGRESS NOTES
Digital Medicine: Health  Follow-Up    Patient reports her foot is still bothering her.  Patient reports she has Neuropathy in her feet. Patient reports she has two more sessions of therapy.         Follow Up  Follow-up reason(s): reading review            Physical Activity:       Encouraged patient to try some compression socks/foot sleeves to help with the pain and aching in her feet.        INTERVENTION(S)  encouragement/support      There are no preventive care reminders to display for this patient.    Last 5 Patient Entered Readings                                      Current 30 Day Average: 123/68     Recent Readings 10/11/2019 10/5/2019 9/29/2019 9/25/2019 9/20/2019    SBP (mmHg) 117 131 124 117 127    DBP (mmHg) 68 69 69 64 72    Pulse 87 89 82 83 85

## 2019-10-28 ENCOUNTER — PATIENT OUTREACH (OUTPATIENT)
Dept: OTHER | Facility: OTHER | Age: 69
End: 2019-10-28

## 2019-10-28 NOTE — PROGRESS NOTES
Called patient back per request of VM. Reports increased walking and has experienced some leg pain and asked about OTC medications. Informed patient that she can take Tylenol, patient verbalized understanding.

## 2019-11-18 ENCOUNTER — PATIENT OUTREACH (OUTPATIENT)
Dept: OTHER | Facility: OTHER | Age: 69
End: 2019-11-18

## 2019-11-18 DIAGNOSIS — I10 HYPERTENSION, ESSENTIAL: Primary | ICD-10-CM

## 2019-11-18 RX ORDER — LOSARTAN POTASSIUM 100 MG/1
TABLET ORAL
Qty: 90 TABLET | Refills: 1 | Status: SHIPPED | OUTPATIENT
Start: 2019-11-18 | End: 2020-02-11 | Stop reason: SDUPTHER

## 2019-11-18 NOTE — PROGRESS NOTES
Patient called to request that I send new prescription for losartan 100 mg to preferred pharmacy. Refill request sent. Will continue monitoring and follow-up as scheduled.    Last 5 Patient Entered Readings                                      Current 30 Day Average: 125/69     Recent Readings 11/15/2019 11/8/2019 11/1/2019 10/25/2019 10/19/2019    SBP (mmHg) 121 128 124 119 132    DBP (mmHg) 67 68 61 64 77    Pulse 92 80 88 80 92        Hypertension Medications             amLODIPine (NORVASC) 10 MG tablet Take 1 tablet (10 mg total) by mouth once daily.    hydroCHLOROthiazide (HYDRODIURIL) 12.5 MG Tab TAKE 1 TABLET(12.5 MG) BY MOUTH EVERY DAY    losartan (COZAAR) 100 MG tablet TAKE 1 TABLET(100 MG) BY MOUTH EVERY DAY

## 2019-11-25 NOTE — TELEPHONE ENCOUNTER
----- Message from Arnel Barksdale NP sent at 8/27/2019 12:32 PM CDT -----  Hi there! Patient had CMP today that revealed slightly elevated enzymes. She tells me that BP medication changes were made recently. Not sure if this would be the reasoning but Shweta advised she follow up with you for repeat.   Calcium elevated but corrected is normal.   Thanks,   WILLIAM   No

## 2019-12-10 NOTE — PROGRESS NOTES
HPI:  Called Ms. Graciela Aranda for hypertension follow-up. Patient is doing well overall. She plans to start losartan in about 2 months and has no additional questions or concerns at this time.     Last 5 Patient Entered Readings                                      Current 30 Day Average: 127/69     Recent Readings 3/16/2019 3/16/2019 3/10/2019 3/4/2019 2/23/2019    SBP (mmHg) 132 140 126 126 123    DBP (mmHg) 66 72 73 69 65    Pulse 86 87 100 100 81        Patient denies s/s of hypotension (lightheadedness, dizziness, nausea, fatigue) associated with low readings. Instructed patient to inform me if this occurs, patient confirms understanding.    Patient denies s/s of hypertension (SOB, CP, severe headaches, changes in vision) associated with high readings. Instructed patient to go to the ED if BP >180/110 and accompanied by hypertensive s/s, patient confirms understanding.    Assessment:  Patient's current 30-day average is at goal of <130/80 mmHg.    Plan:  Continue current regimen and start losartan when lisinopril complete.  Patients health , [facility], will be following up every 3-4 weeks.   I will continue to monitor regularly and will follow-up in 10 weeks, sooner if blood pressure begins to trend upward or downward.     Current medication regimen:  Hypertension Medications             amLODIPine (NORVASC) 10 MG tablet TAKE 1 TABLET(10 MG) BY MOUTH EVERY DAY    hydroCHLOROthiazide (HYDRODIURIL) 12.5 MG Tab Take 1 tablet (12.5 mg total) by mouth once daily.    losartan (COZAAR) 100 MG tablet Take 1 tablet (100 mg total) by mouth once daily.        Patient has my contact information and knows to call with any concerns or clinical changes.      
never

## 2019-12-19 DIAGNOSIS — I10 HYPERTENSION, ESSENTIAL: ICD-10-CM

## 2019-12-19 RX ORDER — HYDROCHLOROTHIAZIDE 12.5 MG/1
TABLET ORAL
Qty: 90 TABLET | Refills: 0 | Status: SHIPPED | OUTPATIENT
Start: 2019-12-19 | End: 2020-03-10

## 2019-12-19 RX ORDER — HYDROCHLOROTHIAZIDE 12.5 MG/1
TABLET ORAL
Qty: 90 TABLET | Refills: 0 | OUTPATIENT
Start: 2019-12-19

## 2019-12-19 NOTE — PROGRESS NOTES
Quick DC. Duplicate Request  Refill Authorization Note     is requesting a refill authorization.    Brief assessment and rationale for refill: QUICK DC: duplicate request          Medication Therapy Plan: Handled in a previous encounter; Quick dc, duplicate request           Comments:   Last Prescribed Info:    Authorizing Provider: Moy Patel MD KAMALA #:  [id number] NPI:  [id number]    Ordering User:  Jacinda PowellD      Cosigner:  Moy Patel MD Signed:  12/19/2019 15:16           Diagnosis Association: Hypertension, essential (I10)      Original Order:  hydroCHLOROthiazide (HYDRODIURIL) 12.5 MG Tab [[id number]]      Pharmacy:  [id number] - [place], [place] - [address] AT [facility] [address]        hydroCHLOROthiazide (HYDRODIURIL) 12.5 MG Tab 90 tablet 0 12/19/2019     Sig: TAKE 1 TABLET(12.5 MG) BY MOUTH EVERY DAY    Sent to pharmacy as: hydroCHLOROthiazide (HYDRODIURIL) 12.5 MG Tab    Cosign for Ordering: Accepted by Moy Patel MD on 12/19/2019  3:16 PM    E-Prescribing Status: Receipt confirmed by pharmacy (12/19/2019  3:13 PM CST)

## 2019-12-26 ENCOUNTER — TELEPHONE (OUTPATIENT)
Dept: HEMATOLOGY/ONCOLOGY | Facility: CLINIC | Age: 69
End: 2019-12-26

## 2019-12-26 NOTE — TELEPHONE ENCOUNTER
spoke with pt on today in regards to upcoming appointments, pt is aware and has confirm new appointments.

## 2020-01-02 ENCOUNTER — CLINICAL SUPPORT (OUTPATIENT)
Dept: SMOKING CESSATION | Facility: CLINIC | Age: 70
End: 2020-01-02
Payer: COMMERCIAL

## 2020-01-02 DIAGNOSIS — F17.200 NICOTINE DEPENDENCE: Primary | ICD-10-CM

## 2020-01-02 PROCEDURE — 99407 BEHAV CHNG SMOKING > 10 MIN: CPT | Mod: S$GLB,,, | Performed by: INTERNAL MEDICINE

## 2020-01-02 PROCEDURE — 99407 PR TOBACCO USE CESSATION INTENSIVE >10 MINUTES: ICD-10-PCS | Mod: S$GLB,,, | Performed by: INTERNAL MEDICINE

## 2020-01-02 NOTE — PROGRESS NOTES
"Spoke with patient today in regard to smoking cessation progress for 12 month telephone follow up, she states not tobacco free. Patient states smoking about 1 ppd of cigarettes and no interest in returning to the program at this time. She states "I want to think about it". Informed patient of benefit period and contact information if any further help or support is needed. Will resolve episode and complete smart form for Quit attempt #4 and 5.    "

## 2020-01-03 ENCOUNTER — PATIENT OUTREACH (OUTPATIENT)
Dept: OTHER | Facility: OTHER | Age: 70
End: 2020-01-03

## 2020-01-03 NOTE — PROGRESS NOTES
Digital Medicine: Health  Follow-Up    Patient and I spoke about the holidays and being glad they over.  Gave praises to patient for keep her BP average down during the holidays.  Patient denies any other concerns at this time.           Follow Up  Follow-up reason(s): reading review        INTERVENTION(S)  encouragement/support and denied further coaching    PLAN  continue monitoring      There are no preventive care reminders to display for this patient.    Last 5 Patient Entered Readings                                      Current 30 Day Average: 122/67     Recent Readings 1/3/2020 12/27/2019 12/21/2019 12/19/2019 12/13/2019    SBP (mmHg) 121 114 137 109 126    DBP (mmHg) 62 67 75 67 65    Pulse 79 92 81 80 83            Diet Screening   No change to diet.      Physical Activity Screening   No change to exercise routine.

## 2020-02-11 DIAGNOSIS — C50.011 MALIGNANT NEOPLASM OF NIPPLE OF RIGHT BREAST IN FEMALE, UNSPECIFIED ESTROGEN RECEPTOR STATUS: ICD-10-CM

## 2020-02-11 DIAGNOSIS — I10 HYPERTENSION, ESSENTIAL: ICD-10-CM

## 2020-02-11 RX ORDER — ANASTROZOLE 1 MG/1
TABLET ORAL
Qty: 90 TABLET | Refills: 4 | Status: SHIPPED | OUTPATIENT
Start: 2020-02-11 | End: 2021-04-26

## 2020-02-11 RX ORDER — LOSARTAN POTASSIUM 100 MG/1
TABLET ORAL
Qty: 90 TABLET | Refills: 0 | Status: SHIPPED | OUTPATIENT
Start: 2020-02-11 | End: 2020-05-15

## 2020-02-12 ENCOUNTER — TELEPHONE (OUTPATIENT)
Dept: HEMATOLOGY/ONCOLOGY | Facility: CLINIC | Age: 70
End: 2020-02-12

## 2020-02-12 NOTE — TELEPHONE ENCOUNTER
----- Message from Murphy Marino sent at 2/12/2020  9:00 AM CST -----  Contact: Patient  Reschedule existing appt      Appt date rescheduling:: 02/26    Is appt today or next day appt::  No    Type of appt :: lab and f/u    Provider:: Vidya    Contact:: [phone]    Addition info::

## 2020-02-12 NOTE — TELEPHONE ENCOUNTER
PT called to reschedule her appointment which is the day after [place]. PT prefers mid-morning. Will keep current appointment.

## 2020-02-12 NOTE — PROGRESS NOTES
Subjective:       Patient ID: Graciela Aranda is a 69 y.o. female.    Chief Complaint: Malignant neoplasm of upper-outer quadrant of left breast in    HPI  This is a 69 year old female with T2N1 ER/SC + left breast cancer, currently on adjuvant Arimidex, here for follow up.     She notes she still has peripheral neuropathy in the fingers and toes.    +fatigue in evenings.   +peripheral neuropathy about the same- PT helped - swelling of the right ankle occasionally.   No bone pain or other pain issues.   Stressed when she comes to her appts.   Tolerating Arimidex well.  No musculoskeletal complaints.   Continues to smoke      Oncology History:  The patient is a 68-year-old white female with left invasive breast cancer (T2 N1 M0) ER+, SC+, Her2 negative with positive sentinel lymph node from left axilla who completed 4 cycles of AC and 4 cycles of Taxol in the neoadjuvant setting.  Post bilateral mastectomy on 05/29/2015 with Dr. Monte. Her final pathology revealed:  The left breast had a 2.2 cm of residual invasive ductal carcinoma of overall grade 2 disease. Surgical margins were negative.  The right breast revealed no evidence of invasive carcinoma. +ADH  There were six left sentinel lymph nodes sampled within four sentinel lymph node specimens;  three of these lymph nodes were positive, all of which were noted intraoperatively to be positive. The sizes of the metastatic foci were 3 mm, 2    mm and 3 mm respectively.   She had been consented for the NSABP B-51, an [place] trial with the positive lymph node intraoperatively. She was randomized to no    axillary lymph node dissection and will be proceeding with adjuvant left axillary radiotherapy.  She was initially scheduled for a left total complete mastectomy without reconstruction and with sentinel lymph node biopsy 11/03/2014. But the preop MRI, revealed a contralateral suspicious right-sided abnormality located at the 3 o'clock position of the right breast.  The MRI also revealed a suspicious lymph node in the left axilla. We obtained target ultrasound of both areas, which confirmed the areas of abnormality in both the right medial breast at the 3 o'clock position as well as a 15 mm left axillary lymph node.She has undergone ultrasound-guided core needle biopsy of the right medial breast mass as well as the left axillary lymph node.   The pathology from right breast mass show fibroadenoma but axillary lymph nodes is positive for involvement with adenocarcinoma ER/IL positive, HER-2/anderson negative tumor.    Her left-sided breast cancer also revealed an ER/IL positive, HER-2/anderson negative tumor that was approximately 5 cm on clinical presentation and on MRI, this mass also measured 41 mm in the middle lower outer region of the left breast located 5 cm from the nipple.       Health maintenance:  [facility] 3/2019 - due in March 2021     Colonoscopy - Had her colonoscopy in the interval which revealed 1 - 3mm polyp that was removed, hyperplastic on pathology. Repeat recommended in 5 years (2021).    Review of Systems   Constitutional: Positive for fatigue. Negative for activity change, appetite change, chills, diaphoresis, fever and unexpected weight change.   HENT: Negative for congestion, dental problem, ear pain, hearing loss, mouth sores, nosebleeds, rhinorrhea, sinus pressure, sinus pain, sneezing, sore throat, tinnitus and trouble swallowing.    Eyes: Negative for redness and visual disturbance.   Respiratory: Negative for cough, chest tightness, shortness of breath and wheezing.    Cardiovascular: Negative for chest pain, palpitations and leg swelling.   Gastrointestinal: Negative for abdominal distention, abdominal pain, blood in stool, constipation, diarrhea, nausea and vomiting.   Genitourinary: Negative for decreased urine volume, difficulty urinating, dysuria, frequency, hematuria and urgency.   Musculoskeletal: Negative for arthralgias, back pain, gait problem, joint  swelling and neck pain.   Skin: Negative for color change, pallor, rash and wound.   Neurological: Positive for numbness (and tingling in feet since chemo). Negative for dizziness, weakness, light-headedness and headaches.   Hematological: Negative for adenopathy. Does not bruise/bleed easily.   Psychiatric/Behavioral: Negative for dysphoric mood and sleep disturbance. The patient is nervous/anxious.        Objective:      Physical Exam   Constitutional: She is oriented to person, place, and time. She appears well-developed and well-nourished. No distress.   Presents alone  ECOG 0    HENT:   Head: Normocephalic and atraumatic.   Right Ear: External ear normal.   Left Ear: External ear normal.   Nose: Nose normal.   Mouth/Throat: Oropharynx is clear and moist. No oropharyngeal exudate.   Eyes: Pupils are equal, round, and reactive to light. Conjunctivae are normal. Right eye exhibits no discharge. Left eye exhibits no discharge. No scleral icterus.   Neck: Normal range of motion. Neck supple.   Cardiovascular: Normal rate, regular rhythm, normal heart sounds and intact distal pulses. Exam reveals no gallop and no friction rub.   No murmur heard.  Pulmonary/Chest: Effort normal and breath sounds normal. No respiratory distress. She has no wheezes. She has no rales.   Post bilateral mastectomy without evidence of chest wall recurrence     Abdominal: Soft. Bowel sounds are normal. She exhibits no distension and no mass. There is no tenderness. There is no rebound and no guarding.   No organomegaly   Musculoskeletal: Normal range of motion. She exhibits no edema, tenderness or deformity.   Lymphadenopathy:        Head (right side): No submental and no submandibular adenopathy present.        Head (left side): No submental and no submandibular adenopathy present.     She has no cervical adenopathy.     She has no axillary adenopathy.   Neurological: She is alert and oriented to person, place, and time.   Skin: Skin is  warm and dry. No rash noted. She is not diaphoretic. No erythema. No pallor.   Psychiatric: She has a normal mood and affect. Her behavior is normal. Judgment and thought content normal.   Nursing note and vitals reviewed.      Corrected calcium   10.4 mg/dL      Assessment:       1. Malignant neoplasm of upper-outer quadrant of left breast in female, estrogen receptor positive    2. Aromatase inhibitor use    3. S/P bilateral mastectomy    4. Hypertension, essential    5. Hyperlipidemia, unspecified hyperlipidemia type    6. Osteopenia, unspecified location    7. Stage 3 chronic kidney disease        Plan:     1-3. Doing well, NAU clinically. Continue Arimidex.   4. Monitored today; continue current medication and follow up with PCP   5. Continue current medication and follow up with PCP  6. BMD reviewed. Repeat 3/2021.  7. Monitored; stable continue to follow up with Dr. Patel  Continue to follow up with PCP for other health maintenance.      RTC 6 months to see Dr. Collins with CBC, CMP.     Return to clinic in 6 months with MD appointment and labs.     Patient is in agreement with the proposed treatment plan. All questions were answered to the patient's satisfaction. Patient knows to call clinic for any new or worsening symptoms and if anything is needed before the next clinic visit.          Danette Dasilva, ALECP-C  Hematology & Medical Oncology   [facility] [place] [address]  ph. [phone]  Fax. [phone]    Patient dicussed with collaborating physician, Dr. Collins.

## 2020-02-18 ENCOUNTER — PATIENT MESSAGE (OUTPATIENT)
Dept: OTHER | Facility: OTHER | Age: 70
End: 2020-02-18

## 2020-02-26 ENCOUNTER — OFFICE VISIT (OUTPATIENT)
Dept: HEMATOLOGY/ONCOLOGY | Facility: CLINIC | Age: 70
End: 2020-02-26
Payer: MEDICARE

## 2020-02-26 ENCOUNTER — LAB VISIT (OUTPATIENT)
Dept: LAB | Facility: HOSPITAL | Age: 70
End: 2020-02-26
Payer: MEDICARE

## 2020-02-26 VITALS
HEIGHT: 69 IN | RESPIRATION RATE: 18 BRPM | BODY MASS INDEX: 22.08 KG/M2 | DIASTOLIC BLOOD PRESSURE: 70 MMHG | OXYGEN SATURATION: 99 % | TEMPERATURE: 99 F | SYSTOLIC BLOOD PRESSURE: 146 MMHG | HEART RATE: 84 BPM | WEIGHT: 149.06 LBS

## 2020-02-26 DIAGNOSIS — M85.80 OSTEOPENIA, UNSPECIFIED LOCATION: ICD-10-CM

## 2020-02-26 DIAGNOSIS — Z17.0 MALIGNANT NEOPLASM OF UPPER-OUTER QUADRANT OF LEFT BREAST IN FEMALE, ESTROGEN RECEPTOR POSITIVE: ICD-10-CM

## 2020-02-26 DIAGNOSIS — I10 HYPERTENSION, ESSENTIAL: ICD-10-CM

## 2020-02-26 DIAGNOSIS — E78.5 HYPERLIPIDEMIA, UNSPECIFIED HYPERLIPIDEMIA TYPE: ICD-10-CM

## 2020-02-26 DIAGNOSIS — Z79.811 AROMATASE INHIBITOR USE: ICD-10-CM

## 2020-02-26 DIAGNOSIS — Z90.13 S/P BILATERAL MASTECTOMY: ICD-10-CM

## 2020-02-26 DIAGNOSIS — C50.412 MALIGNANT NEOPLASM OF UPPER-OUTER QUADRANT OF LEFT BREAST IN FEMALE, ESTROGEN RECEPTOR POSITIVE: ICD-10-CM

## 2020-02-26 DIAGNOSIS — C50.412 MALIGNANT NEOPLASM OF UPPER-OUTER QUADRANT OF LEFT BREAST IN FEMALE, ESTROGEN RECEPTOR POSITIVE: Primary | ICD-10-CM

## 2020-02-26 DIAGNOSIS — Z17.0 MALIGNANT NEOPLASM OF UPPER-OUTER QUADRANT OF LEFT BREAST IN FEMALE, ESTROGEN RECEPTOR POSITIVE: Primary | ICD-10-CM

## 2020-02-26 DIAGNOSIS — N18.30 STAGE 3 CHRONIC KIDNEY DISEASE: ICD-10-CM

## 2020-02-26 LAB
ALBUMIN SERPL BCP-MCNC: 4.6 G/DL (ref 3.5–5.2)
ALP SERPL-CCNC: 95 U/L (ref 55–135)
ALT SERPL W/O P-5'-P-CCNC: 36 U/L (ref 10–44)
ANION GAP SERPL CALC-SCNC: 11 MMOL/L (ref 8–16)
AST SERPL-CCNC: 27 U/L (ref 10–40)
BILIRUB SERPL-MCNC: 0.7 MG/DL (ref 0.1–1)
BUN SERPL-MCNC: 18 MG/DL (ref 8–23)
CALCIUM SERPL-MCNC: 10.9 MG/DL (ref 8.7–10.5)
CHLORIDE SERPL-SCNC: 101 MMOL/L (ref 95–110)
CO2 SERPL-SCNC: 25 MMOL/L (ref 23–29)
CREAT SERPL-MCNC: 1.2 MG/DL (ref 0.5–1.4)
ERYTHROCYTE [DISTWIDTH] IN BLOOD BY AUTOMATED COUNT: 14.5 % (ref 11.5–14.5)
EST. GFR  (AFRICAN AMERICAN): 52.9 ML/MIN/1.73 M^2
EST. GFR  (NON AFRICAN AMERICAN): 45.9 ML/MIN/1.73 M^2
GLUCOSE SERPL-MCNC: 124 MG/DL (ref 70–110)
HCT VFR BLD AUTO: 44.7 % (ref 37–48.5)
HGB BLD-MCNC: 14.8 G/DL (ref 12–16)
IMM GRANULOCYTES # BLD AUTO: 0.03 K/UL (ref 0–0.04)
MCH RBC QN AUTO: 33.7 PG (ref 27–31)
MCHC RBC AUTO-ENTMCNC: 33.1 G/DL (ref 32–36)
MCV RBC AUTO: 102 FL (ref 82–98)
NEUTROPHILS # BLD AUTO: 5.7 K/UL (ref 1.8–7.7)
PLATELET # BLD AUTO: 218 K/UL (ref 150–350)
PMV BLD AUTO: 10.1 FL (ref 9.2–12.9)
POTASSIUM SERPL-SCNC: 3.9 MMOL/L (ref 3.5–5.1)
PROT SERPL-MCNC: 7.8 G/DL (ref 6–8.4)
RBC # BLD AUTO: 4.39 M/UL (ref 4–5.4)
SODIUM SERPL-SCNC: 137 MMOL/L (ref 136–145)
WBC # BLD AUTO: 7.84 K/UL (ref 3.9–12.7)

## 2020-02-26 PROCEDURE — 99214 OFFICE O/P EST MOD 30 MIN: CPT | Mod: PBBFAC | Performed by: NURSE PRACTITIONER

## 2020-02-26 PROCEDURE — 36415 COLL VENOUS BLD VENIPUNCTURE: CPT

## 2020-02-26 PROCEDURE — 80053 COMPREHEN METABOLIC PANEL: CPT

## 2020-02-26 PROCEDURE — 85027 COMPLETE CBC AUTOMATED: CPT

## 2020-02-26 PROCEDURE — 99214 PR OFFICE/OUTPT VISIT, EST, LEVL IV, 30-39 MIN: ICD-10-PCS | Mod: S$PBB,,, | Performed by: NURSE PRACTITIONER

## 2020-02-26 PROCEDURE — 99999 PR PBB SHADOW E&M-EST. PATIENT-LVL IV: CPT | Mod: PBBFAC,,, | Performed by: NURSE PRACTITIONER

## 2020-02-26 PROCEDURE — 99999 PR PBB SHADOW E&M-EST. PATIENT-LVL IV: ICD-10-PCS | Mod: PBBFAC,,, | Performed by: NURSE PRACTITIONER

## 2020-02-26 PROCEDURE — 99214 OFFICE O/P EST MOD 30 MIN: CPT | Mod: S$PBB,,, | Performed by: NURSE PRACTITIONER

## 2020-03-08 DIAGNOSIS — I10 HYPERTENSION, ESSENTIAL: ICD-10-CM

## 2020-03-10 NOTE — PROGRESS NOTES
Refill Authorization Note     is requesting a refill authorization.    Brief assessment and rationale for refill: APPROVE: prr     Medication-related problems identified: Therapeutic duplication    Medication Therapy Plan: Therapeutic duplication(lipitor) removed from med list; Approve 6 more months                              Comments:   Requested Prescriptions   Pending Prescriptions Disp Refills    amLODIPine (NORVASC) 10 MG tablet [Pharmacy Med Name: AMLODIPINE BESYLATE 10MG TABLETS] 90 tablet 1     Sig: TAKE 1 TABLET BY MOUTH EVERY DAY       Cardiovascular:  Calcium Channel Blockers Failed - 3/8/2020  5:09 AM        Failed - Last BP in normal range within 360 days.     BP Readings from Last 3 Encounters:   02/26/20 (!) 146/70   09/13/19 134/80   08/27/19 (!) 149/70              Passed - Patient is at least 18 years old        Passed - Office visit in past 12 months or future 90 days.     Recent Outpatient Visits            1 week ago Malignant neoplasm of upper-outer quadrant of left breast in female, estrogen receptor positive    [facility] - Hematology Oncology Danette Dasilva NP    5 months ago Urinary frequency    [facility] Internal Medicine Moy Patel MD    6 months ago Malignant neoplasm of upper-outer quadrant of left breast in female, estrogen receptor positive    [facility] - Hematology Oncology Arnel Barksdale NP    1 year ago Malignant neoplasm of upper-outer quadrant of left breast in female, estrogen receptor positive    [facility] - Hematology Oncology Silvia Collins MD    1 year ago Hypertension, essential    [facility] Internal Medicine Moy Patel MD                   hydroCHLOROthiazide (HYDRODIURIL) 12.5 MG Tab [Pharmacy Med Name: HYDROCHLOROTHIAZIDE 12.5MG TABLETS] 90 tablet 1     Sig: TAKE 1 TABLET BY MOUTH EVERY DAY       Cardiovascular: Diuretics - Thiazide Failed - 3/10/2020  3:25 PM        Failed - Last BP in normal range within 360 days.     BP Readings from  Last 3 Encounters:   02/26/20 (!) 146/70   09/13/19 134/80   08/27/19 (!) 149/70              Failed - Ca in normal range and within 360 days     Calcium   Date Value Ref Range Status   02/26/2020 10.9 (H) 8.7 - 10.5 mg/dL Final   08/27/2019 11.1 (H) 8.7 - 10.5 mg/dL Final   02/26/2019 10.8 (H) 8.7 - 10.5 mg/dL Final              Passed - Patient is at least 18 years old        Passed - Office visit in past 12 months or future 90 days.     Recent Outpatient Visits            1 week ago Malignant neoplasm of upper-outer quadrant of left breast in female, estrogen receptor positive    [facility] - Hematology Oncology Danette Dasilva NP    5 months ago Urinary frequency    [facility] - Internal Medicine Moy Patel MD    6 months ago Malignant neoplasm of upper-outer quadrant of left breast in female, estrogen receptor positive    [facility] - Hematology Oncology Arnel Barksdale NP    1 year ago Malignant neoplasm of upper-outer quadrant of left breast in female, estrogen receptor positive    [facility] - Hematology Oncology Silvia Collins MD    1 year ago Hypertension, essential    [facility] - Internal Medicine Moy Patel MD                    Passed - Cr is 1.3 or below and within 180 days     Creatinine   Date Value Ref Range Status   02/26/2020 1.2 0.5 - 1.4 mg/dL Final   08/27/2019 1.2 0.5 - 1.4 mg/dL Final   02/26/2019 1.2 0.5 - 1.4 mg/dL Final              Passed - K in normal range and within 180 days     Potassium   Date Value Ref Range Status   02/26/2020 3.9 3.5 - 5.1 mmol/L Final   08/27/2019 4.6 3.5 - 5.1 mmol/L Final   02/26/2019 4.0 3.5 - 5.1 mmol/L Final              Passed - Na is between 130 and 148 and within 180 days     Sodium   Date Value Ref Range Status   02/26/2020 137 136 - 145 mmol/L Final   08/27/2019 138 136 - 145 mmol/L Final   02/26/2019 138 136 - 145 mmol/L Final              Passed - eGFR within 180 days     eGFR if non    Date Value Ref Range Status    02/26/2020 45.9 (A) >60 mL/min/1.73 m^2 Final     Comment:     Calculation used to obtain the estimated glomerular filtration  rate (eGFR) is the CKD-EPI equation.      08/27/2019 46.2 (A) >60 mL/min/1.73 m^2 Final     Comment:     Calculation used to obtain the estimated glomerular filtration  rate (eGFR) is the CKD-EPI equation.      02/26/2019 46.2 (A) >60 mL/min/1.73 m^2 Final     Comment:     Calculation used to obtain the estimated glomerular filtration  rate (eGFR) is the CKD-EPI equation.        eGFR if    Date Value Ref Range Status   02/26/2020 52.9 (A) >60 mL/min/1.73 m^2 Final   08/27/2019 53.3 (A) >60 mL/min/1.73 m^2 Final   02/26/2019 53.3 (A) >60 mL/min/1.73 m^2 Final             atorvastatin (LIPITOR) 20 MG tablet [Pharmacy Med Name: ATORVASTATIN 20MG TABLETS] 90 tablet 1     Sig: TAKE 1 TABLET(20 MG) BY MOUTH EVERY DAY       Cardiovascular:  Antilipid - Statins Passed - 3/8/2020  5:09 AM        Passed - Patient is at least 18 years old        Passed - Office visit in past 12 months or future 90 days.     Recent Outpatient Visits            1 week ago Malignant neoplasm of upper-outer quadrant of left breast in female, estrogen receptor positive    [facility] - Hematology Oncology Danette Dasilva NP    5 months ago Urinary frequency    [facility] - Internal Medicine Moy Patel MD    6 months ago Malignant neoplasm of upper-outer quadrant of left breast in female, estrogen receptor positive    [facility] - Hematology Oncology Arnel Barksdale NP    1 year ago Malignant neoplasm of upper-outer quadrant of left breast in female, estrogen receptor positive    [facility] - Hematology Oncology Silvia Collins MD    1 year ago Hypertension, essential    [facility] Moy Patel MD                    Passed - Lipid Panel completed in last 360 days     Lab Results   Component Value Date    CHOL 141 09/10/2019    HDL 59 09/10/2019    LDLCALC 66 09/10/2019    TRIG 81  09/10/2019             Passed - ALT is 94 or below and within 360 days     ALT   Date Value Ref Range Status   02/26/2020 36 10 - 44 U/L Final   09/10/2019 39 14 - 54 U/L Final   08/27/2019 69 (H) 10 - 44 U/L Final              Passed - AST is 54 or below and within 360 days     AST   Date Value Ref Range Status   02/26/2020 27 10 - 40 U/L Final   09/10/2019 34 15 - 41 U/L Final   08/27/2019 42 (H) 10 - 40 U/L Final               Appointments  past 12m or future 3m with PCP    Date Provider   Last Visit   9/13/2019 Moy Patel MD   Next Visit   Visit date not found Moy Patel MD   .  ED visits in past 90 days: 0       Note composed:3:32 PM 03/10/2020

## 2020-03-11 RX ORDER — HYDROCHLOROTHIAZIDE 12.5 MG/1
TABLET ORAL
Qty: 90 TABLET | Refills: 1 | Status: SHIPPED | OUTPATIENT
Start: 2020-03-11 | End: 2020-09-07

## 2020-03-11 RX ORDER — ATORVASTATIN CALCIUM 20 MG/1
TABLET, FILM COATED ORAL
Qty: 90 TABLET | Refills: 1 | Status: SHIPPED | OUTPATIENT
Start: 2020-03-11 | End: 2020-09-06

## 2020-03-11 RX ORDER — AMLODIPINE BESYLATE 10 MG/1
TABLET ORAL
Qty: 90 TABLET | Refills: 1 | Status: SHIPPED | OUTPATIENT
Start: 2020-03-11 | End: 2020-09-06

## 2020-03-27 ENCOUNTER — PATIENT OUTREACH (OUTPATIENT)
Dept: OTHER | Facility: OTHER | Age: 70
End: 2020-03-27

## 2020-05-15 ENCOUNTER — PATIENT MESSAGE (OUTPATIENT)
Dept: OTHER | Facility: OTHER | Age: 70
End: 2020-05-15

## 2020-05-15 DIAGNOSIS — I10 HYPERTENSION, ESSENTIAL: ICD-10-CM

## 2020-05-15 RX ORDER — LOSARTAN POTASSIUM 100 MG/1
TABLET ORAL
Qty: 90 TABLET | Refills: 1 | Status: SHIPPED | OUTPATIENT
Start: 2020-05-15 | End: 2020-08-14

## 2020-06-02 ENCOUNTER — PES CALL (OUTPATIENT)
Dept: ADMINISTRATIVE | Facility: CLINIC | Age: 70
End: 2020-06-02

## 2020-06-17 ENCOUNTER — PATIENT OUTREACH (OUTPATIENT)
Dept: OTHER | Facility: OTHER | Age: 70
End: 2020-06-17

## 2020-06-17 NOTE — PROGRESS NOTES
"Digital Medicine: Health  Follow-Up    The history is provided by the patient.   Follow Up  Follow-up reason(s): reading review and routine education    Called pt to introduce myself as her new health  for digital medicine. We spent the majority of our phone conversation getting to know each other.    Pt reports recent stress due to "another death in the family". She states she has had 4 deaths in her family recently. Gave my condolences. Says even though she has been stressed, she is pleased with her BP readings. Although she does say sometimes just taking a reading causes her to stress because she does not want to reading to be out of goal range.    She denies questions or concerns today. Provided my contact information to pt. Encouraged her to reach out with future questions. Gave encouragement and support.       INTERVENTION(S)  encouragement/support and denied questions    PLAN  patient verbalizes understanding and continue monitoring      There are no preventive care reminders to display for this patient.    Last 5 Patient Entered Readings                                      Current 30 Day Average: 128/66     Recent Readings 6/15/2020 6/10/2020 6/5/2020 5/28/2020 5/24/2020    SBP (mmHg) 114 122 132 131 133    DBP (mmHg) 51 67 69 66 70    Pulse 85 79 86 80 91                      Diet Screening   No change to diet.  She has the following dietary restrictions: low sodium diet    Physical Activity Screening   No change to exercise routine.          SDOH  "

## 2020-07-01 ENCOUNTER — TELEPHONE (OUTPATIENT)
Dept: HEMATOLOGY/ONCOLOGY | Facility: CLINIC | Age: 70
End: 2020-07-01

## 2020-07-01 NOTE — TELEPHONE ENCOUNTER
Message fwd to .     ----- Message from Cayla Cook sent at 7/1/2020  8:12 AM CDT -----  Regarding: Follow Up Appointment Request  Contact: PT  PT called in to schedule a follow up appointment    Callback: [phone]

## 2020-07-01 NOTE — TELEPHONE ENCOUNTER
----- Message from Marialuisa Mercedes sent at 7/1/2020  8:12 AM CDT -----  Regarding: FW: Follow Up Appointment Request  Contact: KE  August recall  ----- Message -----  From: Cayla Cook  Sent: 7/1/2020   8:12 AM CDT  To: Karina RUVALCABA Staff  Subject: Follow Up Appointment Request                    PT called in to schedule a follow up appointment    Callback: [phone]

## 2020-07-15 DIAGNOSIS — Z71.89 COMPLEX CARE COORDINATION: ICD-10-CM

## 2020-08-20 ENCOUNTER — TELEPHONE (OUTPATIENT)
Dept: HEMATOLOGY/ONCOLOGY | Facility: CLINIC | Age: 70
End: 2020-08-20
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
## 2020-08-21 ENCOUNTER — TELEPHONE (OUTPATIENT)
Dept: HEMATOLOGY/ONCOLOGY | Facility: CLINIC | Age: 70
End: 2020-08-21

## 2020-08-21 DIAGNOSIS — Z17.0 MALIGNANT NEOPLASM OF UPPER-OUTER QUADRANT OF LEFT BREAST IN FEMALE, ESTROGEN RECEPTOR POSITIVE: Primary | ICD-10-CM

## 2020-08-21 DIAGNOSIS — C50.412 MALIGNANT NEOPLASM OF UPPER-OUTER QUADRANT OF LEFT BREAST IN FEMALE, ESTROGEN RECEPTOR POSITIVE: Primary | ICD-10-CM
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
## 2020-08-26 ENCOUNTER — PES CALL (OUTPATIENT)
Dept: ADMINISTRATIVE | Facility: CLINIC | Age: 70
End: 2020-08-26

## 2020-08-28 ENCOUNTER — TELEPHONE (OUTPATIENT)
Dept: HEMATOLOGY/ONCOLOGY | Facility: CLINIC | Age: 70
End: 2020-08-28

## 2020-08-28 NOTE — TELEPHONE ENCOUNTER
"----- Message from Kennedy Cabello sent at 8/28/2020  8:31 AM CDT -----  Reschedule Existing Appointment    Appt Date: 09/04/20  Type of appt :  NON FASTING LAB [[id number]] & ESTABLISHED PATIENT [[id number]]  Physician: Dr Collins   Reason for rescheduling? Pt would like an appointment closer to 1030 AM if possible.  Please contact to discuss availability   Caller: Graciela   Contact Preference: [phone]    Additional Information:  "Thank you for all that you do for our patients'"       "

## 2020-09-04 ENCOUNTER — LAB VISIT (OUTPATIENT)
Dept: LAB | Facility: HOSPITAL | Age: 70
End: 2020-09-04
Payer: MEDICARE

## 2020-09-04 ENCOUNTER — OFFICE VISIT (OUTPATIENT)
Dept: HEMATOLOGY/ONCOLOGY | Facility: CLINIC | Age: 70
End: 2020-09-04
Payer: MEDICARE

## 2020-09-04 ENCOUNTER — RESEARCH ENCOUNTER (OUTPATIENT)
Dept: RESEARCH | Facility: HOSPITAL | Age: 70
End: 2020-09-04

## 2020-09-04 VITALS
OXYGEN SATURATION: 98 % | RESPIRATION RATE: 16 BRPM | WEIGHT: 148.38 LBS | BODY MASS INDEX: 21.98 KG/M2 | SYSTOLIC BLOOD PRESSURE: 146 MMHG | TEMPERATURE: 99 F | HEART RATE: 95 BPM | HEIGHT: 69 IN | DIASTOLIC BLOOD PRESSURE: 67 MMHG

## 2020-09-04 DIAGNOSIS — Z17.0 MALIGNANT NEOPLASM OF UPPER-OUTER QUADRANT OF LEFT BREAST IN FEMALE, ESTROGEN RECEPTOR POSITIVE: ICD-10-CM

## 2020-09-04 DIAGNOSIS — E55.9 VITAMIN D DEFICIENCY: ICD-10-CM

## 2020-09-04 DIAGNOSIS — M81.6 LOCALIZED OSTEOPOROSIS (LEQUESNE): ICD-10-CM

## 2020-09-04 DIAGNOSIS — Z17.0 MALIGNANT NEOPLASM OF UPPER-OUTER QUADRANT OF LEFT BREAST IN FEMALE, ESTROGEN RECEPTOR POSITIVE: Primary | ICD-10-CM

## 2020-09-04 DIAGNOSIS — C50.412 MALIGNANT NEOPLASM OF UPPER-OUTER QUADRANT OF LEFT BREAST IN FEMALE, ESTROGEN RECEPTOR POSITIVE: Primary | ICD-10-CM

## 2020-09-04 DIAGNOSIS — C50.412 MALIGNANT NEOPLASM OF UPPER-OUTER QUADRANT OF LEFT BREAST IN FEMALE, ESTROGEN RECEPTOR POSITIVE: ICD-10-CM

## 2020-09-04 DIAGNOSIS — M85.80 OSTEOPENIA, UNSPECIFIED LOCATION: ICD-10-CM

## 2020-09-04 DIAGNOSIS — I10 HYPERTENSION, ESSENTIAL: ICD-10-CM

## 2020-09-04 DIAGNOSIS — Z79.811 AROMATASE INHIBITOR USE: ICD-10-CM

## 2020-09-04 LAB
ALBUMIN SERPL BCP-MCNC: 4.6 G/DL (ref 3.5–5.2)
ALP SERPL-CCNC: 92 U/L (ref 55–135)
ALT SERPL W/O P-5'-P-CCNC: 49 U/L (ref 10–44)
ANION GAP SERPL CALC-SCNC: 8 MMOL/L (ref 8–16)
AST SERPL-CCNC: 34 U/L (ref 10–40)
BILIRUB SERPL-MCNC: 0.4 MG/DL (ref 0.1–1)
BUN SERPL-MCNC: 21 MG/DL (ref 8–23)
CALCIUM SERPL-MCNC: 10.4 MG/DL (ref 8.7–10.5)
CHLORIDE SERPL-SCNC: 100 MMOL/L (ref 95–110)
CO2 SERPL-SCNC: 26 MMOL/L (ref 23–29)
CREAT SERPL-MCNC: 1.4 MG/DL (ref 0.5–1.4)
ERYTHROCYTE [DISTWIDTH] IN BLOOD BY AUTOMATED COUNT: 13.1 % (ref 11.5–14.5)
EST. GFR  (AFRICAN AMERICAN): 43.9 ML/MIN/1.73 M^2
EST. GFR  (NON AFRICAN AMERICAN): 38.1 ML/MIN/1.73 M^2
GLUCOSE SERPL-MCNC: 136 MG/DL (ref 70–110)
HCT VFR BLD AUTO: 41.1 % (ref 37–48.5)
HGB BLD-MCNC: 14.3 G/DL (ref 12–16)
IMM GRANULOCYTES # BLD AUTO: 0.04 K/UL (ref 0–0.04)
MCH RBC QN AUTO: 35.2 PG (ref 27–31)
MCHC RBC AUTO-ENTMCNC: 34.8 G/DL (ref 32–36)
MCV RBC AUTO: 101 FL (ref 82–98)
NEUTROPHILS # BLD AUTO: 6.7 K/UL (ref 1.8–7.7)
PLATELET # BLD AUTO: 225 K/UL (ref 150–350)
PMV BLD AUTO: 9.8 FL (ref 9.2–12.9)
POTASSIUM SERPL-SCNC: 4.1 MMOL/L (ref 3.5–5.1)
PROT SERPL-MCNC: 7.6 G/DL (ref 6–8.4)
RBC # BLD AUTO: 4.06 M/UL (ref 4–5.4)
SODIUM SERPL-SCNC: 134 MMOL/L (ref 136–145)
WBC # BLD AUTO: 8.88 K/UL (ref 3.9–12.7)

## 2020-09-04 PROCEDURE — 36415 COLL VENOUS BLD VENIPUNCTURE: CPT

## 2020-09-04 PROCEDURE — 80053 COMPREHEN METABOLIC PANEL: CPT

## 2020-09-04 PROCEDURE — 99214 PR OFFICE/OUTPT VISIT, EST, LEVL IV, 30-39 MIN: ICD-10-PCS | Mod: S$PBB,,, | Performed by: INTERNAL MEDICINE

## 2020-09-04 PROCEDURE — 99999 PR PBB SHADOW E&M-EST. PATIENT-LVL IV: CPT | Mod: PBBFAC,,, | Performed by: INTERNAL MEDICINE

## 2020-09-04 PROCEDURE — 85027 COMPLETE CBC AUTOMATED: CPT

## 2020-09-04 PROCEDURE — 99999 PR PBB SHADOW E&M-EST. PATIENT-LVL IV: ICD-10-PCS | Mod: PBBFAC,,, | Performed by: INTERNAL MEDICINE

## 2020-09-04 PROCEDURE — 99214 OFFICE O/P EST MOD 30 MIN: CPT | Mod: S$PBB,,, | Performed by: INTERNAL MEDICINE

## 2020-09-04 PROCEDURE — 99214 OFFICE O/P EST MOD 30 MIN: CPT | Mod: PBBFAC | Performed by: INTERNAL MEDICINE

## 2020-09-04 NOTE — TELEPHONE ENCOUNTER
No new care gaps identified.  Powered by [employer]. Reference number: [id number]. 9/04/2020 5:20:24 AM CDT

## 2020-09-04 NOTE — PROGRESS NOTES
Friday, September 4, 2020    Protocol: [id number]  Investigator: SHRUTHI Monte MD  Pt Initials: HILARIA DESOUZA  Subject ID: [id number]  IRB #: [id number]     [id number]: A Randomized Phase III Trial Evaluating the Role of Axillary Lymph Node Dissection in Breast Cancer Patients (cT1-3 N1) Who Have Positive [place] Lymph Node Disease After Neoadjuvant Chemotherapy     Re-Consent Note:     Met with patient, who presented alone, in clinic this afternoon to re-consent to the above mentioned clinical trial. Patient alert and oriented, mood and affect appropriate to the situation. The new information was reviewed with the patient including being followed for up to 8 years and the increase in the number of participants. Patient understands there will be no change in tests or procedures during this period. Patient's questions answered to her satisfaction. Patient has agreed to be followed for up to 8 years. Patient willingly and independently signed consent on [id number]. A signed copy of the consent along with my contact information was given to the patient.

## 2020-09-04 NOTE — PROGRESS NOTES
Subjective:       Patient ID: Graciela Aranda is a 69 y.o. female.    Chief Complaint: No chief complaint on file.    HPI     This is a 69 year old female with T2N1 ER/MO + left breast cancer, currently on adjuvant Arimidex, here for follow up.      Reports significant stress through the COVID period  She lost 3 acquaintances to COVID and also lost close ones to cardiac issues and cancer- total of 13 and this has been tough on her  Son was redelpoyed to be an ER nurse during COVID and this stressed her out  Remains stressed about her health    She has chronic peripheral neuropathy in the fingers and toes but better.  She did complete most of her P for this but stopped.    No bone pain or other pain issues.   Tolerating Arimidex well- completes Arimidex this year.  No musculoskeletal complaints.   Continues to smoke       Oncology History:  The patient is a 69-year-old white female with left invasive breast cancer (T2 N1 M0) ER+, MO+, Her2 negative with positive sentinel lymph node from left axilla who completed 4 cycles of AC and 4 cycles of Taxol in the neoadjuvant setting.  Post bilateral mastectomy on 05/29/2015 with Dr. Monte. Her final pathology revealed:  The left breast had a 2.2 cm of residual invasive ductal carcinoma of overall grade 2 disease. Surgical margins were negative.  The right breast revealed no evidence of invasive carcinoma. +ADH  There were six left sentinel lymph nodes sampled within four sentinel lymph node specimens;  three of these lymph nodes were positive, all of which were noted intraoperatively to be positive. The sizes of the metastatic foci were 3 mm, 2    mm and 3 mm respectively.   She had been consented for the NSABP B-51, an [place] trial with the positive lymph node intraoperatively. She was randomized to no    axillary lymph node dissection and will be proceeding with adjuvant left axillary radiotherapy.  She was initially scheduled for a left total complete mastectomy  without reconstruction and with sentinel lymph node biopsy 11/03/2014. But the preop MRI, revealed a contralateral suspicious right-sided abnormality located at the 3 o'clock position of the right breast. The MRI also revealed a suspicious lymph node in the left axilla. We obtained target ultrasound of both areas, which confirmed the areas of abnormality in both the right medial breast at the 3 o'clock position as well as a 15 mm left axillary lymph node.She has undergone ultrasound-guided core needle biopsy of the right medial breast mass as well as the left axillary lymph node.   The pathology from right breast mass show fibroadenoma but axillary lymph nodes is positive for involvement with adenocarcinoma ER/VA positive, HER-2/anderson negative tumor.    Her left-sided breast cancer also revealed an ER/VA positive, HER-2/anderson negative tumor that was approximately 5 cm on clinical presentation and on MRI, this mass also measured 41 mm in the middle lower outer region of the left breast located 5 cm from the nipple.       Health maintenance:  [facility] 3/2019 - due in March 2021     Colonoscopy - Had her colonoscopy in the interval which revealed 1 - 3mm polyp that was removed, hyperplastic on pathology. Repeat recommended in 5 years (2021).    Review of Systems   Constitutional: Negative for activity change and appetite change.   HENT: Negative for nasal congestion.    Respiratory: Negative for cough, shortness of breath and wheezing.    Cardiovascular: Negative for chest pain, palpitations and leg swelling.   Gastrointestinal: Negative for abdominal distention, abdominal pain, blood in stool, constipation, diarrhea, nausea and vomiting.   Genitourinary: Negative for decreased urine volume, difficulty urinating, dysuria and urgency.   Musculoskeletal: Negative for arthralgias.   Neurological: Negative for weakness, light-headedness, numbness and headaches.   Psychiatric/Behavioral: The patient is nervous/anxious.           Objective:      Physical Exam  Vitals signs and nursing note reviewed.   Constitutional:       General: She is not in acute distress.     Appearance: Normal appearance. She is well-developed. She is not diaphoretic.      Comments: Presents alone  ECOG= 0    HENT:      Head: Normocephalic and atraumatic.      Right Ear: External ear normal.      Left Ear: External ear normal.      Nose: Nose normal.      Mouth/Throat:      Pharynx: No oropharyngeal exudate.   Eyes:      General: No scleral icterus.        Right eye: No discharge.         Left eye: No discharge.      Conjunctiva/sclera: Conjunctivae normal.      Pupils: Pupils are equal, round, and reactive to light.   Neck:      Musculoskeletal: Normal range of motion and neck supple.   Cardiovascular:      Rate and Rhythm: Normal rate and regular rhythm.      Heart sounds: Normal heart sounds. No murmur. No friction rub. No gallop.    Pulmonary:      Effort: Pulmonary effort is normal. No respiratory distress.      Breath sounds: Normal breath sounds. No wheezing or rales.   Abdominal:      General: Bowel sounds are normal. There is no distension.      Palpations: Abdomen is soft. There is no mass.      Tenderness: There is no abdominal tenderness. There is no guarding or rebound.      Comments: No organomegaly   Musculoskeletal: Normal range of motion.         General: No tenderness or deformity.   Lymphadenopathy:      Head:      Right side of head: No submental or submandibular adenopathy.      Left side of head: No submental or submandibular adenopathy.      Cervical: No cervical adenopathy.   Skin:     General: Skin is warm and dry.      Coloration: Skin is not pale.      Findings: No erythema or rash.   Neurological:      Mental Status: She is alert and oriented to person, place, and time.   Psychiatric:         Behavior: Behavior normal.         Thought Content: Thought content normal.         Judgment: Judgment normal.       Labs- reviewed  Assessment:        1. Malignant neoplasm of upper-outer quadrant of left breast in female, estrogen receptor positive    2. Osteopenia, unspecified location    3. Vitamin D deficiency    4. Aromatase inhibitor use    5. Hypertension, essential        Plan:     1. ANU  Completes Arimidex this year when current three month supply completes  RTC 1 year  Knows to call for any issues  2-4. In appropriate replacement  Repeat BMD in 2021  5. On appropriate therapy    25 minutes total

## 2020-09-06 RX ORDER — AMLODIPINE BESYLATE 10 MG/1
TABLET ORAL
Qty: 90 TABLET | Refills: 0 | Status: SHIPPED | OUTPATIENT
Start: 2020-09-06 | End: 2020-09-24 | Stop reason: SDUPTHER

## 2020-09-06 RX ORDER — ATORVASTATIN CALCIUM 20 MG/1
TABLET, FILM COATED ORAL
Qty: 90 TABLET | Refills: 0 | Status: SHIPPED | OUTPATIENT
Start: 2020-09-06 | End: 2020-09-24 | Stop reason: SDUPTHER

## 2020-09-06 NOTE — PROGRESS NOTES
Refill Authorization Note    is requesting a refill authorization.    Brief assessment and rationale for refill: DEFER: ABNORMAL LABS(HCTZ)/APPROVE: PRR          Medication Therapy Plan: CDMR; FOVS; SCr-ELEVATED(1.4) NOT TO ORC STANDARDS ABNORMAL LABS, PLEASE ADVISE DEFER TO YOU;  BP-ELEVATED(146/67) AT HEM/ONC, BP-WNL(134/80) LOV; APPROVE NORVASC AND LIPITOR PER PROTOCOL    Medication reconciliation completed: No                    Comments:          Requested Prescriptions   Pending Prescriptions Disp Refills    amLODIPine (NORVASC) 10 MG tablet [Pharmacy Med Name: AMLODIPINE BESYLATE 10MG TABLETS] 90 tablet 0     Sig: TAKE 1 TABLET BY MOUTH EVERY DAY       Cardiovascular:  Calcium Channel Blockers Failed - 9/6/2020 12:17 PM        Failed - Last BP in normal range within 360 days.     BP Readings from Last 3 Encounters:   09/04/20 (!) 146/67   02/26/20 (!) 146/70   09/13/19 134/80              Passed - Patient is at least 18 years old        Passed - Office visit in past 12 months or future 90 days.     Recent Outpatient Visits            2 days ago Malignant neoplasm of upper-outer quadrant of left breast in female, estrogen receptor positive    [place] [facility] Silvia Collins MD    6 months ago Malignant neoplasm of upper-outer quadrant of left breast in female, estrogen receptor positive    [place] [facility] Danette Dasilva NP    11 months ago Urinary frequency    [facility] Moy Patel MD    1 year ago Malignant neoplasm of upper-outer quadrant of left breast in female, estrogen receptor positive    [place] [facility] Arnel Barksdale NP    1 year ago Malignant neoplasm of upper-outer quadrant of left breast in female, estrogen receptor positive    [place] [facility] Silvia Collins MD          Future Appointments              In 2 weeks LAB, [facility], [facility]    In 2 weeks Moy GILLIAM  MD [facility] Pat[facility], [facility]                  hydroCHLOROthiazide (HYDRODIURIL) 12.5 MG Tab [Pharmacy Med Name: HYDROCHLOROTHIAZIDE 12.5MG TABLETS] 90 tablet 0     Sig: TAKE 1 TABLET BY MOUTH EVERY DAY       Cardiovascular: Diuretics - Thiazide Failed - 9/6/2020 12:17 PM        Failed - Last BP in normal range within 360 days.     BP Readings from Last 3 Encounters:   09/04/20 (!) 146/67   02/26/20 (!) 146/70   09/13/19 134/80              Failed - Cr is 1.3 or below and within 180 days     Creatinine   Date Value Ref Range Status   09/04/2020 1.4 0.5 - 1.4 mg/dL Final   02/26/2020 1.2 0.5 - 1.4 mg/dL Final   08/27/2019 1.2 0.5 - 1.4 mg/dL Final              Passed - Patient is at least 18 years old        Passed - Office visit in past 12 months or future 90 days.     Recent Outpatient Visits            2 days ago Malignant neoplasm of upper-outer quadrant of left breast in female, estrogen receptor positive    [place] [facility] Silvia Collins MD    6 months ago Malignant neoplasm of upper-outer quadrant of left breast in female, estrogen receptor positive    [place] [facility] Danette Dasilva NP    11 months ago Urinary frequency    [facility] Moy Patel MD    1 year ago Malignant neoplasm of upper-outer quadrant of left breast in female, estrogen receptor positive    [place] [facility] Arnel Barksdale NP    1 year ago Malignant neoplasm of upper-outer quadrant of left breast in female, estrogen receptor positive    [place] [facility] Silvia Collins MD          Future Appointments              In 2 weeks LAB, [facility], . [facility]    In 2 weeks MD [facility], [facility] Campos [facility]                Passed - Ca in normal range and within 360 days     Calcium   Date Value Ref Range Status   09/04/2020 10.4 8.7 - 10.5 mg/dL Final   02/26/2020 10.9  (H) 8.7 - 10.5 mg/dL Final   08/27/2019 11.1 (H) 8.7 - 10.5 mg/dL Final              Passed - K in normal range and within 180 days     Potassium   Date Value Ref Range Status   09/04/2020 4.1 3.5 - 5.1 mmol/L Final   02/26/2020 3.9 3.5 - 5.1 mmol/L Final   08/27/2019 4.6 3.5 - 5.1 mmol/L Final              Passed - Na is between 130 and 148 and within 180 days     Sodium   Date Value Ref Range Status   09/04/2020 134 (L) 136 - 145 mmol/L Final   02/26/2020 137 136 - 145 mmol/L Final   08/27/2019 138 136 - 145 mmol/L Final              Passed - eGFR within 180 days     eGFR if non    Date Value Ref Range Status   09/04/2020 38.1 (A) >60 mL/min/1.73 m^2 Final     Comment:     Calculation used to obtain the estimated glomerular filtration  rate (eGFR) is the CKD-EPI equation.      02/26/2020 45.9 (A) >60 mL/min/1.73 m^2 Final     Comment:     Calculation used to obtain the estimated glomerular filtration  rate (eGFR) is the CKD-EPI equation.      08/27/2019 46.2 (A) >60 mL/min/1.73 m^2 Final     Comment:     Calculation used to obtain the estimated glomerular filtration  rate (eGFR) is the CKD-EPI equation.        eGFR if    Date Value Ref Range Status   09/04/2020 43.9 (A) >60 mL/min/1.73 m^2 Final   02/26/2020 52.9 (A) >60 mL/min/1.73 m^2 Final   08/27/2019 53.3 (A) >60 mL/min/1.73 m^2 Final                atorvastatin (LIPITOR) 20 MG tablet [Pharmacy Med Name: ATORVASTATIN 20MG TABLETS] 90 tablet 0     Sig: TAKE 1 TABLET BY MOUTH EVERY DAY       Cardiovascular:  Antilipid - Statins Passed - 9/4/2020  5:20 AM        Passed - Patient is at least 18 years old        Passed - Office visit in past 12 months or future 90 days.     Recent Outpatient Visits            2 days ago Malignant neoplasm of upper-outer quadrant of left breast in female, estrogen receptor positive    [place] [facility] Silvia Collins MD    6 months ago Malignant neoplasm of upper-outer quadrant of left  breast in female, estrogen receptor positive    [place] [facility] Danette Dasilva, NP    11 months ago Urinary frequency    [facility] Moy Patel MD    1 year ago Malignant neoplasm of upper-outer quadrant of left breast in female, estrogen receptor positive    [place] [facility] Arnel Barksdale, NP    1 year ago Malignant neoplasm of upper-outer quadrant of left breast in female, estrogen receptor positive    [place] [facility] Silvia Collins MD          Future Appointments              In 2 weeks LAB, [facility], [facility]    In 2 weeks MD [facility], [facility]mo kimo [facility]                Passed - Lipid Panel completed in last 360 days     Lab Results   Component Value Date    CHOL 141 09/10/2019    HDL 59 09/10/2019    LDLCALC 66 09/10/2019    TRIG 81 09/10/2019             Passed - ALT is 94 or below and within 360 days     ALT   Date Value Ref Range Status   09/04/2020 49 (H) 10 - 44 U/L Final   02/26/2020 36 10 - 44 U/L Final   09/10/2019 39 14 - 54 U/L Final              Passed - AST is 54 or below and within 360 days     AST   Date Value Ref Range Status   09/04/2020 34 10 - 40 U/L Final   02/26/2020 27 10 - 40 U/L Final   09/10/2019 34 15 - 41 U/L Final                  Appointments  past 12m or future 3m with PCP    Date Provider   Last Visit   9/13/2019 Moy Patel MD   Next Visit   9/24/2020 Moy Patel MD   ED visits in past 90 days: 0     Note composed:12:23 PM 09/06/2020

## 2020-09-07 RX ORDER — HYDROCHLOROTHIAZIDE 12.5 MG/1
TABLET ORAL
Qty: 90 TABLET | Refills: 0 | Status: SHIPPED | OUTPATIENT
Start: 2020-09-07 | End: 2020-09-24 | Stop reason: SDUPTHER

## 2020-09-07 NOTE — TELEPHONE ENCOUNTER
Patient is due for a follow up appointment - please call patient to schedule appointment. Also, please review lab orders placed and schedule before appointment. Thank you.    Lipid panel.

## 2020-09-08 NOTE — TELEPHONE ENCOUNTER
Per patient chart, looks like patient was already scheduled for fasting lab-lipid panel on 09/21 Mon at 10 am along with follow up appointment on Thurs 09/24 at 10:40 am.   Patient was notified that all prescriptions requested by her were sent into pharmacy on file.

## 2020-09-09 ENCOUNTER — PATIENT OUTREACH (OUTPATIENT)
Dept: OTHER | Facility: OTHER | Age: 70
End: 2020-09-09
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
## 2020-09-10 ENCOUNTER — PATIENT OUTREACH (OUTPATIENT)
Dept: ADMINISTRATIVE | Facility: HOSPITAL | Age: 70
End: 2020-09-10

## 2020-09-24 ENCOUNTER — OFFICE VISIT (OUTPATIENT)
Dept: INTERNAL MEDICINE | Facility: CLINIC | Age: 70
End: 2020-09-24
Attending: FAMILY MEDICINE
Payer: MEDICARE

## 2020-09-24 VITALS
HEIGHT: 68 IN | WEIGHT: 148.13 LBS | OXYGEN SATURATION: 99 % | BODY MASS INDEX: 22.45 KG/M2 | HEART RATE: 65 BPM | SYSTOLIC BLOOD PRESSURE: 130 MMHG | DIASTOLIC BLOOD PRESSURE: 62 MMHG

## 2020-09-24 DIAGNOSIS — Z79.811 AROMATASE INHIBITOR USE: ICD-10-CM

## 2020-09-24 DIAGNOSIS — Z17.0 MALIGNANT NEOPLASM OF UPPER-OUTER QUADRANT OF LEFT BREAST IN FEMALE, ESTROGEN RECEPTOR POSITIVE: ICD-10-CM

## 2020-09-24 DIAGNOSIS — I10 HYPERTENSION, ESSENTIAL: ICD-10-CM

## 2020-09-24 DIAGNOSIS — G62.0 NEUROPATHY DUE TO CHEMOTHERAPEUTIC DRUG: ICD-10-CM

## 2020-09-24 DIAGNOSIS — T45.1X5A NEUROPATHY DUE TO CHEMOTHERAPEUTIC DRUG: ICD-10-CM

## 2020-09-24 DIAGNOSIS — E78.5 HYPERLIPIDEMIA, UNSPECIFIED HYPERLIPIDEMIA TYPE: ICD-10-CM

## 2020-09-24 DIAGNOSIS — C50.412 MALIGNANT NEOPLASM OF UPPER-OUTER QUADRANT OF LEFT BREAST IN FEMALE, ESTROGEN RECEPTOR POSITIVE: ICD-10-CM

## 2020-09-24 DIAGNOSIS — Z87.891 PERSONAL HISTORY OF NICOTINE DEPENDENCE: ICD-10-CM

## 2020-09-24 DIAGNOSIS — Z90.13 S/P BILATERAL MASTECTOMY: ICD-10-CM

## 2020-09-24 DIAGNOSIS — Z00.00 ANNUAL PHYSICAL EXAM: Primary | ICD-10-CM

## 2020-09-24 PROCEDURE — 99999 PR PBB SHADOW E&M-EST. PATIENT-LVL III: CPT | Mod: PBBFAC,,, | Performed by: FAMILY MEDICINE

## 2020-09-24 PROCEDURE — 99213 OFFICE O/P EST LOW 20 MIN: CPT | Mod: PBBFAC | Performed by: FAMILY MEDICINE

## 2020-09-24 PROCEDURE — 99214 PR OFFICE/OUTPT VISIT, EST, LEVL IV, 30-39 MIN: ICD-10-PCS | Mod: S$PBB,,, | Performed by: FAMILY MEDICINE

## 2020-09-24 PROCEDURE — 99214 OFFICE O/P EST MOD 30 MIN: CPT | Mod: S$PBB,,, | Performed by: FAMILY MEDICINE

## 2020-09-24 PROCEDURE — 99999 PR PBB SHADOW E&M-EST. PATIENT-LVL III: ICD-10-PCS | Mod: PBBFAC,,, | Performed by: FAMILY MEDICINE

## 2020-09-24 RX ORDER — HYDROCHLOROTHIAZIDE 12.5 MG/1
12.5 TABLET ORAL DAILY
Qty: 90 TABLET | Refills: 3 | Status: SHIPPED | OUTPATIENT
Start: 2020-09-24 | End: 2021-10-05

## 2020-09-24 RX ORDER — AMLODIPINE BESYLATE 10 MG/1
10 TABLET ORAL DAILY
Qty: 90 TABLET | Refills: 3 | Status: SHIPPED | OUTPATIENT
Start: 2020-09-24 | End: 2021-10-05 | Stop reason: SDUPTHER

## 2020-09-24 RX ORDER — LOSARTAN POTASSIUM 100 MG/1
TABLET ORAL
Qty: 90 TABLET | Refills: 3 | Status: SHIPPED | OUTPATIENT
Start: 2020-09-24 | End: 2021-10-05 | Stop reason: SDUPTHER

## 2020-09-24 RX ORDER — INFLUENZA A VIRUS A/MICHIGAN/45/2015 X-275 (H1N1) ANTIGEN (FORMALDEHYDE INACTIVATED), INFLUENZA A VIRUS A/SINGAPORE/INFIMH-16-0019/2016 IVR-186 (H3N2) ANTIGEN (FORMALDEHYDE INACTIVATED), INFLUENZA B VIRUS B/PHUKET/3073/2013 ANTIGEN (FORMALDEHYDE INACTIVATED), AND INFLUENZA B VIRUS B/MARYLAND/15/2016 BX-69A ANTIGEN (FORMALDEHYDE INACTIVATED) 60; 60; 60; 60 UG/.7ML; UG/.7ML; UG/.7ML; UG/.7ML
INJECTION, SUSPENSION INTRAMUSCULAR
COMMUNITY
Start: 2020-09-08 | End: 2020-09-24 | Stop reason: ALTCHOICE

## 2020-09-24 RX ORDER — ATORVASTATIN CALCIUM 20 MG/1
20 TABLET, FILM COATED ORAL DAILY
Qty: 90 TABLET | Refills: 3 | Status: SHIPPED | OUTPATIENT
Start: 2020-09-24 | End: 2021-11-22 | Stop reason: SDUPTHER

## 2020-09-24 NOTE — PROGRESS NOTES
Subjective:       Patient ID: Graciela Aranda is a 69 y.o. female.    Chief Complaint: Annual Exam (labs done 9-)    Established patient follows up for management of chronic medical illnesses with complaints today. Please see dictation and ROS for interval problems, specific complaints and disease management discussion.    P, S, Fm, Soc Hx's; Meds, allergies reviewed and reconciled.  Health maintenance file reviewed and addressed items due.      Established patient for an annual wellness check/physical exam and also chronic disease management. Specific complaints - see dictation and please see ROS.  P, S, Fm, Soc Hx's; Meds, allergies reviewed and reconciled.  Health maintenance file reviewed and addressed items due.        Review of Systems   Constitutional: Negative for chills, fatigue, fever and unexpected weight change.   HENT: Negative for congestion and trouble swallowing.    Eyes: Negative for redness and visual disturbance.   Respiratory: Negative for cough, chest tightness and shortness of breath.    Cardiovascular: Negative for chest pain, palpitations and leg swelling.   Gastrointestinal: Negative for abdominal pain and blood in stool.   Genitourinary: Negative for difficulty urinating, hematuria and menstrual problem.   Musculoskeletal: Negative for arthralgias, back pain, gait problem, joint swelling, myalgias and neck pain.   Skin: Negative for color change and rash.   Neurological: Positive for weakness and numbness. Negative for tremors, speech difficulty and headaches.   Hematological: Negative for adenopathy. Does not bruise/bleed easily.   Psychiatric/Behavioral: Negative for behavioral problems, confusion and sleep disturbance. The patient is not nervous/anxious.        Objective:      Physical Exam  Vitals signs and nursing note reviewed.   Constitutional:       Appearance: She is well-developed. She is not diaphoretic.   Eyes:      General: No scleral icterus.  Neck:      Musculoskeletal:  Normal range of motion and neck supple.      Thyroid: No thyromegaly.      Vascular: No JVD.      Trachea: No tracheal deviation.   Cardiovascular:      Rate and Rhythm: Normal rate.      Heart sounds: Normal heart sounds. No murmur. No friction rub. No gallop.    Pulmonary:      Effort: Pulmonary effort is normal. No respiratory distress.      Breath sounds: Normal breath sounds. No wheezing or rales.   Abdominal:      General: There is no distension or abdominal bruit.      Palpations: Abdomen is soft. There is no mass.      Tenderness: There is no abdominal tenderness. There is no guarding or rebound.   Lymphadenopathy:      Cervical: No cervical adenopathy.   Skin:     General: Skin is warm and dry.      Findings: No erythema or rash.   Neurological:      Mental Status: She is alert and oriented to person, place, and time.      Cranial Nerves: No cranial nerve deficit.      Motor: No tremor.      Coordination: Coordination normal.      Gait: Gait normal.   Psychiatric:         Behavior: Behavior normal.         Thought Content: Thought content normal.         Judgment: Judgment normal.         Assessment:       1. Annual physical exam    2. Malignant neoplasm of upper-outer quadrant of left breast in female, estrogen receptor positive    3. Hypertension, essential    4. Hyperlipidemia, unspecified hyperlipidemia type    5. S/P bilateral mastectomy    6. Aromatase inhibitor use    7. Neuropathy due to chemotherapeutic drug    8. Personal history of nicotine dependence        Plan:     Medication List with Changes/Refills   Current Medications    ANASTROZOLE (ARIMIDEX) 1 MG TAB    TAKE 1 TABLET(1 MG) BY MOUTH EVERY DAY.    EPINEPHRINE (EPIPEN) 0.3 MG/0.3 ML ATIN    Inject 0.3 mLs (0.3 mg total) into the muscle daily as needed.   Changed and/or Refilled Medications    Modified Medication Previous Medication    AMLODIPINE (NORVASC) 10 MG TABLET amLODIPine (NORVASC) 10 MG tablet       Take 1 tablet (10 mg total) by  mouth once daily.    TAKE 1 TABLET BY MOUTH EVERY DAY    ATORVASTATIN (LIPITOR) 20 MG TABLET atorvastatin (LIPITOR) 20 MG tablet       Take 1 tablet (20 mg total) by mouth once daily.    TAKE 1 TABLET BY MOUTH EVERY DAY    HYDROCHLOROTHIAZIDE (HYDRODIURIL) 12.5 MG TAB hydroCHLOROthiazide (HYDRODIURIL) 12.5 MG Tab       Take 1 tablet (12.5 mg total) by mouth once daily.    TAKE 1 TABLET BY MOUTH EVERY DAY    LOSARTAN (COZAAR) 100 MG TABLET losartan (COZAAR) 100 MG tablet       TAKE 1 TABLET BY MOUTH EVERY DAY    TAKE 1 TABLET BY MOUTH EVERY DAY   Discontinued Medications    FLUZONE HIGHDOSE QUAD 20-21  MCG/0.7 ML SYRG    ADM 0.7ML IM UTD     Graciela was seen today for annual exam.    Diagnoses and all orders for this visit:    Annual physical exam    Malignant neoplasm of upper-outer quadrant of left breast in female, estrogen receptor positive    Hypertension, essential  -     amLODIPine (NORVASC) 10 MG tablet; Take 1 tablet (10 mg total) by mouth once daily.  -     atorvastatin (LIPITOR) 20 MG tablet; Take 1 tablet (20 mg total) by mouth once daily.  -     hydroCHLOROthiazide (HYDRODIURIL) 12.5 MG Tab; Take 1 tablet (12.5 mg total) by mouth once daily.  -     losartan (COZAAR) 100 MG tablet; TAKE 1 TABLET BY MOUTH EVERY DAY    Hyperlipidemia, unspecified hyperlipidemia type    S/P bilateral mastectomy    Aromatase inhibitor use    Neuropathy due to chemotherapeutic drug    Personal history of nicotine dependence  Comments:  declined LDCT (9/24/2020)      See meds, orders, follow up, routing and instructions sections of encounter and AVS. Discussed with patient and provided on AVS.    Recent HO labs and lipids reveiwed.

## 2020-09-24 NOTE — PATIENT INSTRUCTIONS
Shingrix vaccine today.    Information about cholesterol, high blood pressure and healthy diet and activity recommendations can be found at the following links on the Internet:    http://www.nhlbi.nih.gov/health/health-topics/topics/hbc  http://www.nhlbi.nih.gov/health/educational/lose_wt/index.htm  Http://www.nhlbi.nih.gov/files/docs/public/heart/hbp_low.pdf  http://www.heart.org/HEARTORG/  http://diabetes.org/  https://www.cdc.gov/  Https://healthfinder.gov/  https://health.gov/dietaryguidelines/2015/guidelines/  https://health.gov/paguidelines/second-edition/pdf/Physical_Activity_Guidelines_2nd_edition.pdf

## 2020-10-12 ENCOUNTER — PATIENT OUTREACH (OUTPATIENT)
Dept: OTHER | Facility: OTHER | Age: 70
End: 2020-10-12
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
## 2020-10-29 ENCOUNTER — PATIENT OUTREACH (OUTPATIENT)
Dept: OTHER | Facility: OTHER | Age: 70
End: 2020-10-29
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
## 2020-11-12 ENCOUNTER — PATIENT OUTREACH (OUTPATIENT)
Dept: OTHER | Facility: OTHER | Age: 70
End: 2020-11-12
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
## 2020-11-17 ENCOUNTER — PATIENT OUTREACH (OUTPATIENT)
Dept: OTHER | Facility: OTHER | Age: 70
End: 2020-11-17
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
## 2020-12-01 ENCOUNTER — PATIENT OUTREACH (OUTPATIENT)
Dept: OTHER | Facility: OTHER | Age: 70
End: 2020-12-01
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
## 2020-12-18 ENCOUNTER — TELEPHONE (OUTPATIENT)
Dept: OTHER | Facility: OTHER | Age: 70
End: 2020-12-18

## 2020-12-18 NOTE — TELEPHONE ENCOUNTER
"Patient reports that 12/17 and 12/18 BP readings appear in her [employer] [facility] however she has received a message stating "readings not submitted." Readings not seen in chart today.     Attempted to walk patient through [employer] password reset but with multiple atttempts she repeatedly received "invalid information provided." Patient opts to be transferred for further tech support today and have CRM placed in case she does not have time to stay on the line with them today as she is low on internet data at the moment.      "

## 2020-12-22 ENCOUNTER — PATIENT OUTREACH (OUTPATIENT)
Dept: OTHER | Facility: OTHER | Age: 70
End: 2020-12-22
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
## 2020-12-31 PROCEDURE — 99457 PR MONITORING, PHYSIOL PARAM, REMOTE, 1ST 20 MINS, PER MONTH: ICD-10-PCS | Mod: S$PBB,,, | Performed by: FAMILY MEDICINE

## 2020-12-31 PROCEDURE — 99457 RPM TX MGMT 1ST 20 MIN: CPT | Mod: S$PBB,,, | Performed by: FAMILY MEDICINE

## 2021-02-09 ENCOUNTER — PATIENT MESSAGE (OUTPATIENT)
Dept: HEMATOLOGY/ONCOLOGY | Facility: CLINIC | Age: 71
End: 2021-02-09

## 2021-02-10 ENCOUNTER — PATIENT MESSAGE (OUTPATIENT)
Dept: HEMATOLOGY/ONCOLOGY | Facility: CLINIC | Age: 71
End: 2021-02-10

## 2021-03-12 ENCOUNTER — TELEPHONE (OUTPATIENT)
Dept: HEMATOLOGY/ONCOLOGY | Facility: CLINIC | Age: 71
End: 2021-03-12

## 2021-03-12 DIAGNOSIS — M81.0 OSTEOPOROSIS, UNSPECIFIED OSTEOPOROSIS TYPE, UNSPECIFIED PATHOLOGICAL FRACTURE PRESENCE: Primary | ICD-10-CM

## 2021-03-15 ENCOUNTER — TELEPHONE (OUTPATIENT)
Dept: HEMATOLOGY/ONCOLOGY | Facility: CLINIC | Age: 71
End: 2021-03-15

## 2021-03-17 ENCOUNTER — DOCUMENTATION ONLY (OUTPATIENT)
Dept: RESEARCH | Facility: HOSPITAL | Age: 71
End: 2021-03-17

## 2021-04-12 ENCOUNTER — PATIENT MESSAGE (OUTPATIENT)
Dept: ENDOCRINOLOGY | Facility: CLINIC | Age: 71
End: 2021-04-12

## 2021-04-26 ENCOUNTER — OFFICE VISIT (OUTPATIENT)
Dept: ENDOCRINOLOGY | Facility: CLINIC | Age: 71
End: 2021-04-26
Payer: MEDICARE

## 2021-04-26 VITALS
SYSTOLIC BLOOD PRESSURE: 112 MMHG | RESPIRATION RATE: 16 BRPM | WEIGHT: 143.75 LBS | HEIGHT: 68 IN | HEART RATE: 80 BPM | BODY MASS INDEX: 21.78 KG/M2 | DIASTOLIC BLOOD PRESSURE: 68 MMHG

## 2021-04-26 DIAGNOSIS — M85.80 OSTEOPENIA, UNSPECIFIED LOCATION: Primary | ICD-10-CM

## 2021-04-26 DIAGNOSIS — E55.9 VITAMIN D DEFICIENCY: ICD-10-CM

## 2021-04-26 PROCEDURE — 99999 PR PBB SHADOW E&M-EST. PATIENT-LVL III: CPT | Mod: PBBFAC,,, | Performed by: INTERNAL MEDICINE

## 2021-04-26 PROCEDURE — 99213 OFFICE O/P EST LOW 20 MIN: CPT | Mod: PBBFAC | Performed by: INTERNAL MEDICINE

## 2021-04-26 PROCEDURE — 99204 PR OFFICE/OUTPT VISIT, NEW, LEVL IV, 45-59 MIN: ICD-10-PCS | Mod: S$PBB,,, | Performed by: INTERNAL MEDICINE

## 2021-04-26 PROCEDURE — 99999 PR PBB SHADOW E&M-EST. PATIENT-LVL III: ICD-10-PCS | Mod: PBBFAC,,, | Performed by: INTERNAL MEDICINE

## 2021-04-26 PROCEDURE — 99204 OFFICE O/P NEW MOD 45 MIN: CPT | Mod: S$PBB,,, | Performed by: INTERNAL MEDICINE

## 2021-05-13 ENCOUNTER — PATIENT MESSAGE (OUTPATIENT)
Dept: ENDOCRINOLOGY | Facility: CLINIC | Age: 71
End: 2021-05-13

## 2021-07-14 ENCOUNTER — TELEPHONE (OUTPATIENT)
Dept: HEMATOLOGY/ONCOLOGY | Facility: CLINIC | Age: 71
End: 2021-07-14

## 2021-09-13 ENCOUNTER — LAB VISIT (OUTPATIENT)
Dept: LAB | Facility: HOSPITAL | Age: 71
End: 2021-09-13
Attending: INTERNAL MEDICINE
Payer: MEDICARE

## 2021-09-13 ENCOUNTER — OFFICE VISIT (OUTPATIENT)
Dept: HEMATOLOGY/ONCOLOGY | Facility: CLINIC | Age: 71
End: 2021-09-13
Payer: MEDICARE

## 2021-09-13 VITALS
HEIGHT: 69 IN | HEART RATE: 97 BPM | SYSTOLIC BLOOD PRESSURE: 119 MMHG | BODY MASS INDEX: 19.46 KG/M2 | OXYGEN SATURATION: 98 % | TEMPERATURE: 98 F | DIASTOLIC BLOOD PRESSURE: 56 MMHG | WEIGHT: 131.38 LBS | RESPIRATION RATE: 16 BRPM

## 2021-09-13 DIAGNOSIS — Z17.0 MALIGNANT NEOPLASM OF UPPER-OUTER QUADRANT OF LEFT BREAST IN FEMALE, ESTROGEN RECEPTOR POSITIVE: Primary | ICD-10-CM

## 2021-09-13 DIAGNOSIS — E55.9 VITAMIN D DEFICIENCY: ICD-10-CM

## 2021-09-13 DIAGNOSIS — C50.412 MALIGNANT NEOPLASM OF UPPER-OUTER QUADRANT OF LEFT BREAST IN FEMALE, ESTROGEN RECEPTOR POSITIVE: ICD-10-CM

## 2021-09-13 DIAGNOSIS — Z90.13 S/P BILATERAL MASTECTOMY: ICD-10-CM

## 2021-09-13 DIAGNOSIS — C50.412 MALIGNANT NEOPLASM OF UPPER-OUTER QUADRANT OF LEFT BREAST IN FEMALE, ESTROGEN RECEPTOR POSITIVE: Primary | ICD-10-CM

## 2021-09-13 DIAGNOSIS — Z17.0 MALIGNANT NEOPLASM OF UPPER-OUTER QUADRANT OF LEFT BREAST IN FEMALE, ESTROGEN RECEPTOR POSITIVE: ICD-10-CM

## 2021-09-13 DIAGNOSIS — F17.200 SMOKER: ICD-10-CM

## 2021-09-13 DIAGNOSIS — F17.210 NICOTINE DEPENDENCE, CIGARETTES, UNCOMPLICATED: ICD-10-CM

## 2021-09-13 DIAGNOSIS — R74.8 ELEVATED LIVER ENZYMES: ICD-10-CM

## 2021-09-13 LAB
25(OH)D3+25(OH)D2 SERPL-MCNC: 86 NG/ML (ref 30–96)
ALBUMIN SERPL BCP-MCNC: 4.3 G/DL (ref 3.5–5.2)
ALP SERPL-CCNC: 96 U/L (ref 55–135)
ALT SERPL W/O P-5'-P-CCNC: 85 U/L (ref 10–44)
ANION GAP SERPL CALC-SCNC: 14 MMOL/L (ref 8–16)
AST SERPL-CCNC: 115 U/L (ref 10–40)
BASOPHILS # BLD AUTO: 0.05 K/UL (ref 0–0.2)
BASOPHILS NFR BLD: 0.7 % (ref 0–1.9)
BILIRUB SERPL-MCNC: 0.7 MG/DL (ref 0.1–1)
BUN SERPL-MCNC: 15 MG/DL (ref 8–23)
CALCIUM SERPL-MCNC: 10.5 MG/DL (ref 8.7–10.5)
CHLORIDE SERPL-SCNC: 96 MMOL/L (ref 95–110)
CO2 SERPL-SCNC: 25 MMOL/L (ref 23–29)
CREAT SERPL-MCNC: 1.4 MG/DL (ref 0.5–1.4)
DIFFERENTIAL METHOD: ABNORMAL
EOSINOPHIL # BLD AUTO: 0.2 K/UL (ref 0–0.5)
EOSINOPHIL NFR BLD: 2.2 % (ref 0–8)
ERYTHROCYTE [DISTWIDTH] IN BLOOD BY AUTOMATED COUNT: 14.6 % (ref 11.5–14.5)
EST. GFR  (AFRICAN AMERICAN): 43.6 ML/MIN/1.73 M^2
EST. GFR  (NON AFRICAN AMERICAN): 37.8 ML/MIN/1.73 M^2
GLUCOSE SERPL-MCNC: 90 MG/DL (ref 70–110)
HCT VFR BLD AUTO: 39.8 % (ref 37–48.5)
HGB BLD-MCNC: 14 G/DL (ref 12–16)
IMM GRANULOCYTES # BLD AUTO: 0.05 K/UL (ref 0–0.04)
IMM GRANULOCYTES NFR BLD AUTO: 0.7 % (ref 0–0.5)
LYMPHOCYTES # BLD AUTO: 1.6 K/UL (ref 1–4.8)
LYMPHOCYTES NFR BLD: 22.4 % (ref 18–48)
MCH RBC QN AUTO: 34.3 PG (ref 27–31)
MCHC RBC AUTO-ENTMCNC: 35.2 G/DL (ref 32–36)
MCV RBC AUTO: 98 FL (ref 82–98)
MONOCYTES # BLD AUTO: 0.6 K/UL (ref 0.3–1)
MONOCYTES NFR BLD: 7.9 % (ref 4–15)
NEUTROPHILS # BLD AUTO: 4.8 K/UL (ref 1.8–7.7)
NEUTROPHILS NFR BLD: 66.1 % (ref 38–73)
NRBC BLD-RTO: 0 /100 WBC
PLATELET # BLD AUTO: 205 K/UL (ref 150–450)
PMV BLD AUTO: 10 FL (ref 9.2–12.9)
POTASSIUM SERPL-SCNC: 3.8 MMOL/L (ref 3.5–5.1)
PROT SERPL-MCNC: 7.5 G/DL (ref 6–8.4)
RBC # BLD AUTO: 4.08 M/UL (ref 4–5.4)
SODIUM SERPL-SCNC: 135 MMOL/L (ref 136–145)
WBC # BLD AUTO: 7.32 K/UL (ref 3.9–12.7)

## 2021-09-13 PROCEDURE — 99214 PR OFFICE/OUTPT VISIT, EST, LEVL IV, 30-39 MIN: ICD-10-PCS | Mod: S$PBB,,, | Performed by: INTERNAL MEDICINE

## 2021-09-13 PROCEDURE — 85025 COMPLETE CBC W/AUTO DIFF WBC: CPT | Performed by: INTERNAL MEDICINE

## 2021-09-13 PROCEDURE — 99214 OFFICE O/P EST MOD 30 MIN: CPT | Mod: S$PBB,,, | Performed by: INTERNAL MEDICINE

## 2021-09-13 PROCEDURE — 99213 OFFICE O/P EST LOW 20 MIN: CPT | Mod: PBBFAC | Performed by: INTERNAL MEDICINE

## 2021-09-13 PROCEDURE — 82306 VITAMIN D 25 HYDROXY: CPT | Performed by: INTERNAL MEDICINE

## 2021-09-13 PROCEDURE — 99999 PR PBB SHADOW E&M-EST. PATIENT-LVL III: CPT | Mod: PBBFAC,,, | Performed by: INTERNAL MEDICINE

## 2021-09-13 PROCEDURE — 36415 COLL VENOUS BLD VENIPUNCTURE: CPT | Performed by: INTERNAL MEDICINE

## 2021-09-13 PROCEDURE — 80053 COMPREHEN METABOLIC PANEL: CPT | Performed by: INTERNAL MEDICINE

## 2021-09-13 PROCEDURE — 99999 PR PBB SHADOW E&M-EST. PATIENT-LVL III: ICD-10-PCS | Mod: PBBFAC,,, | Performed by: INTERNAL MEDICINE

## 2021-09-23 ENCOUNTER — PES CALL (OUTPATIENT)
Dept: ADMINISTRATIVE | Facility: CLINIC | Age: 71
End: 2021-09-23

## 2021-10-04 ENCOUNTER — TELEPHONE (OUTPATIENT)
Dept: INTERNAL MEDICINE | Facility: CLINIC | Age: 71
End: 2021-10-04
Payer: MEDICARE

## 2021-10-04 DIAGNOSIS — C50.412 MALIGNANT NEOPLASM OF UPPER-OUTER QUADRANT OF LEFT BREAST IN FEMALE, ESTROGEN RECEPTOR POSITIVE: ICD-10-CM

## 2021-10-04 DIAGNOSIS — Z17.0 MALIGNANT NEOPLASM OF UPPER-OUTER QUADRANT OF LEFT BREAST IN FEMALE, ESTROGEN RECEPTOR POSITIVE: ICD-10-CM

## 2021-10-04 DIAGNOSIS — Z00.00 ANNUAL PHYSICAL EXAM: Primary | ICD-10-CM

## 2021-10-04 DIAGNOSIS — E78.5 HYPERLIPIDEMIA, UNSPECIFIED HYPERLIPIDEMIA TYPE: ICD-10-CM

## 2021-10-04 DIAGNOSIS — I10 HYPERTENSION, ESSENTIAL: ICD-10-CM

## 2021-10-28 ENCOUNTER — DOCUMENTATION ONLY (OUTPATIENT)
Dept: HEMATOLOGY/ONCOLOGY | Facility: CLINIC | Age: 71
End: 2021-10-28
Payer: MEDICARE

## 2021-10-28 DIAGNOSIS — R91.1 PULMONARY NODULE: ICD-10-CM

## 2021-10-28 DIAGNOSIS — Z17.0 MALIGNANT NEOPLASM OF UPPER-OUTER QUADRANT OF LEFT BREAST IN FEMALE, ESTROGEN RECEPTOR POSITIVE: Primary | ICD-10-CM

## 2021-10-28 DIAGNOSIS — C50.412 MALIGNANT NEOPLASM OF UPPER-OUTER QUADRANT OF LEFT BREAST IN FEMALE, ESTROGEN RECEPTOR POSITIVE: Primary | ICD-10-CM

## 2021-10-28 DIAGNOSIS — F17.210 NICOTINE DEPENDENCE, CIGARETTES, UNCOMPLICATED: ICD-10-CM

## 2021-11-01 ENCOUNTER — TELEPHONE (OUTPATIENT)
Dept: INFUSION THERAPY | Facility: HOSPITAL | Age: 71
End: 2021-11-01
Payer: MEDICARE

## 2021-11-01 ENCOUNTER — TELEPHONE (OUTPATIENT)
Dept: HEMATOLOGY/ONCOLOGY | Facility: CLINIC | Age: 71
End: 2021-11-01
Payer: MEDICARE

## 2021-11-22 ENCOUNTER — OFFICE VISIT (OUTPATIENT)
Dept: INTERNAL MEDICINE | Facility: CLINIC | Age: 71
End: 2021-11-22
Attending: FAMILY MEDICINE
Payer: MEDICARE

## 2021-11-22 VITALS
DIASTOLIC BLOOD PRESSURE: 56 MMHG | WEIGHT: 138.56 LBS | HEART RATE: 85 BPM | OXYGEN SATURATION: 99 % | HEIGHT: 68 IN | BODY MASS INDEX: 21 KG/M2 | SYSTOLIC BLOOD PRESSURE: 118 MMHG

## 2021-11-22 DIAGNOSIS — T45.1X5A NEUROPATHY DUE TO CHEMOTHERAPEUTIC DRUG: ICD-10-CM

## 2021-11-22 DIAGNOSIS — I10 HYPERTENSION, ESSENTIAL: Primary | ICD-10-CM

## 2021-11-22 DIAGNOSIS — R74.8 ELEVATED LIVER ENZYMES: ICD-10-CM

## 2021-11-22 DIAGNOSIS — C50.412 MALIGNANT NEOPLASM OF UPPER-OUTER QUADRANT OF LEFT BREAST IN FEMALE, ESTROGEN RECEPTOR POSITIVE: ICD-10-CM

## 2021-11-22 DIAGNOSIS — Z17.0 MALIGNANT NEOPLASM OF UPPER-OUTER QUADRANT OF LEFT BREAST IN FEMALE, ESTROGEN RECEPTOR POSITIVE: ICD-10-CM

## 2021-11-22 DIAGNOSIS — N18.32 STAGE 3B CHRONIC KIDNEY DISEASE: ICD-10-CM

## 2021-11-22 DIAGNOSIS — M85.80 OSTEOPENIA, UNSPECIFIED LOCATION: ICD-10-CM

## 2021-11-22 DIAGNOSIS — G62.0 NEUROPATHY DUE TO CHEMOTHERAPEUTIC DRUG: ICD-10-CM

## 2021-11-22 DIAGNOSIS — E78.5 HYPERLIPIDEMIA, UNSPECIFIED HYPERLIPIDEMIA TYPE: ICD-10-CM

## 2021-11-22 DIAGNOSIS — K63.5 POLYP OF COLON, UNSPECIFIED PART OF COLON, UNSPECIFIED TYPE: ICD-10-CM

## 2021-11-22 DIAGNOSIS — Z87.891 PERSONAL HISTORY OF NICOTINE DEPENDENCE: ICD-10-CM

## 2021-11-22 DIAGNOSIS — Z12.11 COLON CANCER SCREENING: ICD-10-CM

## 2021-11-22 DIAGNOSIS — Z90.13 S/P BILATERAL MASTECTOMY: ICD-10-CM

## 2021-11-22 PROCEDURE — 99999 PR PBB SHADOW E&M-EST. PATIENT-LVL IV: ICD-10-PCS | Mod: PBBFAC,,, | Performed by: FAMILY MEDICINE

## 2021-11-22 PROCEDURE — 99214 OFFICE O/P EST MOD 30 MIN: CPT | Mod: PBBFAC | Performed by: FAMILY MEDICINE

## 2021-11-22 PROCEDURE — 99214 OFFICE O/P EST MOD 30 MIN: CPT | Mod: S$PBB,,, | Performed by: FAMILY MEDICINE

## 2021-11-22 PROCEDURE — 99999 PR PBB SHADOW E&M-EST. PATIENT-LVL IV: CPT | Mod: PBBFAC,,, | Performed by: FAMILY MEDICINE

## 2021-11-22 PROCEDURE — 99214 PR OFFICE/OUTPT VISIT, EST, LEVL IV, 30-39 MIN: ICD-10-PCS | Mod: S$PBB,,, | Performed by: FAMILY MEDICINE

## 2021-11-22 RX ORDER — AMLODIPINE BESYLATE 10 MG/1
10 TABLET ORAL DAILY
Qty: 90 TABLET | Refills: 3 | Status: SHIPPED | OUTPATIENT
Start: 2021-11-22 | End: 2023-01-03

## 2021-11-22 RX ORDER — HYDROCHLOROTHIAZIDE 12.5 MG/1
12.5 TABLET ORAL EVERY OTHER DAY
Qty: 90 TABLET | Refills: 3 | Status: SHIPPED | OUTPATIENT
Start: 2021-11-22 | End: 2022-12-29

## 2021-11-22 RX ORDER — LOSARTAN POTASSIUM 100 MG/1
TABLET ORAL
Qty: 90 TABLET | Refills: 3 | Status: SHIPPED | OUTPATIENT
Start: 2021-11-22 | End: 2022-11-23

## 2021-11-22 RX ORDER — ATORVASTATIN CALCIUM 20 MG/1
20 TABLET, FILM COATED ORAL DAILY
Qty: 90 TABLET | Refills: 3 | Status: SHIPPED | OUTPATIENT
Start: 2021-11-22 | End: 2022-09-22

## 2021-12-03 ENCOUNTER — OFFICE VISIT (OUTPATIENT)
Dept: PSYCHIATRY | Facility: CLINIC | Age: 71
End: 2021-12-03
Payer: MEDICARE

## 2021-12-03 ENCOUNTER — RESEARCH ENCOUNTER (OUTPATIENT)
Dept: RESEARCH | Facility: HOSPITAL | Age: 71
End: 2021-12-03
Payer: MEDICARE

## 2021-12-03 DIAGNOSIS — F17.210 NICOTINE DEPENDENCE, CIGARETTES, UNCOMPLICATED: ICD-10-CM

## 2021-12-03 DIAGNOSIS — Z17.0 MALIGNANT NEOPLASM OF UPPER-OUTER QUADRANT OF LEFT BREAST IN FEMALE, ESTROGEN RECEPTOR POSITIVE: ICD-10-CM

## 2021-12-03 DIAGNOSIS — C50.412 MALIGNANT NEOPLASM OF UPPER-OUTER QUADRANT OF LEFT BREAST IN FEMALE, ESTROGEN RECEPTOR POSITIVE: ICD-10-CM

## 2021-12-03 DIAGNOSIS — F10.10 ALCOHOL ABUSE: ICD-10-CM

## 2021-12-03 DIAGNOSIS — F41.9 ANXIETY: Primary | ICD-10-CM

## 2021-12-03 PROCEDURE — 90791 PR PSYCHIATRIC DIAGNOSTIC EVALUATION: ICD-10-PCS | Mod: ,,, | Performed by: PSYCHOLOGIST

## 2021-12-03 PROCEDURE — 99999 PR PBB SHADOW E&M-EST. PATIENT-LVL II: ICD-10-PCS | Mod: PBBFAC,,, | Performed by: PSYCHOLOGIST

## 2021-12-03 PROCEDURE — 99999 PR PBB SHADOW E&M-EST. PATIENT-LVL II: CPT | Mod: PBBFAC,,, | Performed by: PSYCHOLOGIST

## 2021-12-03 PROCEDURE — 90791 PSYCH DIAGNOSTIC EVALUATION: CPT | Mod: ,,, | Performed by: PSYCHOLOGIST

## 2021-12-03 PROCEDURE — 99212 OFFICE O/P EST SF 10 MIN: CPT | Mod: PBBFAC | Performed by: PSYCHOLOGIST

## 2022-01-12 ENCOUNTER — DOCUMENTATION ONLY (OUTPATIENT)
Dept: TRANSPLANT | Facility: CLINIC | Age: 72
End: 2022-01-12
Payer: MEDICARE

## 2022-01-12 NOTE — LETTER
January 12, 2022    Graciela Aranda  [address] [place] [address]      Dear Graciela Aranda:    Your doctor has referred you to the [facility]. We are sending this letter to remind you to make an appointment with us to complete the referral process.     Please call us at [phone] to schedule an appointment. We look forward to seeing you soon.     If you received a call and have been scheduled, please disregard this letter.       Sincerely,        [facility]   [address] [place] [id number]  [phone]

## 2022-01-12 NOTE — NURSING
Pt records reviewed.   Pt will be referred to Hepatology.  Elevated liver enzymes  Initial referral received  from the [facility].   Referring provider  Moy Patel MD      Referral letter sent to patient.

## 2022-01-26 ENCOUNTER — PES CALL (OUTPATIENT)
Dept: ADMINISTRATIVE | Facility: CLINIC | Age: 72
End: 2022-01-26
Payer: MEDICARE

## 2022-04-11 ENCOUNTER — TELEPHONE (OUTPATIENT)
Dept: HEMATOLOGY/ONCOLOGY | Facility: CLINIC | Age: 72
End: 2022-04-11
Payer: MEDICARE

## 2022-04-13 ENCOUNTER — TELEPHONE (OUTPATIENT)
Dept: HEMATOLOGY/ONCOLOGY | Facility: CLINIC | Age: 72
End: 2022-04-13
Payer: MEDICARE

## 2022-05-13 ENCOUNTER — TELEPHONE (OUTPATIENT)
Dept: HEMATOLOGY/ONCOLOGY | Facility: CLINIC | Age: 72
End: 2022-05-13
Payer: MEDICARE

## 2022-05-13 NOTE — TELEPHONE ENCOUNTER
Reached out to pt to see how everything was going with her feeling overwhelmed with eye surgery and just a lot of things going on at home (I[employer] went out, not being able to see). Pt unable to drive and see at times. Told her ifthere was anything we can do to help her to give us a call. Pt is not yet ready to schedule her follow up with Dr. Collins yet. Wants her to know that she loves us and will see us soon. I will check in with her again next week.

## 2022-05-19 ENCOUNTER — TELEPHONE (OUTPATIENT)
Dept: HEMATOLOGY/ONCOLOGY | Facility: CLINIC | Age: 72
End: 2022-05-19
Payer: MEDICARE

## 2022-05-19 NOTE — TELEPHONE ENCOUNTER
"----- Message from Gadiel Echeverria sent at 5/19/2022  8:12 AM CDT -----  Regarding: Speak with office  Contact: Graciela  Consult/Advisory:       Name Of Caller: Graciela      Contact Preference?: [phone]       Does patient feel the need to be seen today? No      What is the nature of the call?: Pt was returning a call from around 7:15am this morning.       Additional Notes:  "Thank you for all that you do for our patients'"      "

## 2022-09-04 ENCOUNTER — TELEPHONE (OUTPATIENT)
Dept: INTERNAL MEDICINE | Facility: CLINIC | Age: 72
End: 2022-09-04
Payer: MEDICARE

## 2022-09-04 DIAGNOSIS — R73.9 ELEVATED BLOOD SUGAR: Primary | ICD-10-CM

## 2022-09-04 NOTE — TELEPHONE ENCOUNTER
Please call patient and explain that I would like to repeat some labs since there was a borderline result. The tests show high blood sugar.    Please see orders for A1C and please call patient to schedule soon.     Thank you.

## 2022-09-05 NOTE — TELEPHONE ENCOUNTER
Called and relayed message from pt. Pt states she was not fasting for that lab work as they told her it was not a fasting lab. She said she will do the lab, but wants to wait until she has to do other lab test for her upcoming procedure.

## 2022-09-20 ENCOUNTER — TELEPHONE (OUTPATIENT)
Dept: INTERNAL MEDICINE | Facility: CLINIC | Age: 72
End: 2022-09-20
Payer: MEDICARE

## 2022-09-20 NOTE — TELEPHONE ENCOUNTER
Called and discussed test results and recommendations with the pt. The pt expressed understanding.     The pt wants to wait until she schedules her pre op appt to get the blood drawn. The pt should be having cataract surgery soon

## 2022-09-20 NOTE — TELEPHONE ENCOUNTER
----- Message from Moy Patel MD sent at 9/17/2022  7:45 AM CDT -----  I received automated notification that patient has not read their results and comments on My Chart. Please CALL and advise patient of the comments below, and please confirm that patient has been contacted and received notification. Thank you.    (Per [facility] test results reporting protocols, results for patients with active My Chart patient portal accounts are reported to the  patient portal. Patient are responsible to log in and review results and recommendations made. If patient no longer wishes to use My Chart - then disable accound so we know to report by letter.)    Review specific comments below with patient:    Enclosed are your test results. I would like to repeat some labs since there was a borderline or mildly out of range result(s). The tests show elevated blood sugar.     I will order additional test(s) to be drawn soon.      I will forward a request to the staff to schedule. Please let me know if you don't hear back in a day or two. Thank you and please message if you have any questions.

## 2022-11-16 ENCOUNTER — TELEPHONE (OUTPATIENT)
Dept: INTERNAL MEDICINE | Facility: CLINIC | Age: 72
End: 2022-11-16

## 2022-11-16 DIAGNOSIS — R73.9 ELEVATED BLOOD SUGAR: ICD-10-CM

## 2022-11-16 DIAGNOSIS — I10 HYPERTENSION, ESSENTIAL: Primary | ICD-10-CM

## 2022-11-16 DIAGNOSIS — E78.5 HYPERLIPIDEMIA, UNSPECIFIED HYPERLIPIDEMIA TYPE: ICD-10-CM

## 2022-11-16 DIAGNOSIS — Z12.11 COLON CANCER SCREENING: ICD-10-CM

## 2022-11-16 NOTE — TELEPHONE ENCOUNTER
Patient is wanting to get labs done prior to annual appt in Jan.  Please review pended orders and add or remove what is needed---thanks

## 2022-11-16 NOTE — TELEPHONE ENCOUNTER
Patient has an existing appointment - See lab orders and please call patient to schedule labs before appointment. Thank you

## 2022-11-16 NOTE — TELEPHONE ENCOUNTER
----- Message from Thomas Ovalles sent at 11/16/2022  9:06 AM CST -----  Contact: pt [phone]  The patient scheduled her annual phys on 1/19/22. Please put the orders in.    Thank you

## 2023-01-30 ENCOUNTER — TELEPHONE (OUTPATIENT)
Dept: INTERNAL MEDICINE | Facility: CLINIC | Age: 73
End: 2023-01-30
Payer: MEDICARE

## 2023-01-30 DIAGNOSIS — R79.9 ABNORMAL BLOOD CHEMISTRY: Primary | ICD-10-CM

## 2023-01-31 ENCOUNTER — TELEPHONE (OUTPATIENT)
Dept: INTERNAL MEDICINE | Facility: CLINIC | Age: 73
End: 2023-01-31
Payer: MEDICARE

## 2023-01-31 NOTE — TELEPHONE ENCOUNTER
Please call patient and explain that I would like to repeat some labs since there was a borderline result. The tests show high calcium level.    Please see orders for repeat and please call patient to schedule soon.     Thank you.

## 2023-01-31 NOTE — TELEPHONE ENCOUNTER
----- Message from Kristin Portillo sent at 1/31/2023  9:17 AM CST -----  Contact: Pt [phone]  Patient is returning a phone call.  Who left a message for the patient: Sharon Woodard,  Does patient know what this is regarding:  Yes  Would you like a call back, or a response through your [facility] portal?:   call  Comments:

## 2023-02-17 ENCOUNTER — TELEPHONE (OUTPATIENT)
Dept: INTERNAL MEDICINE | Facility: CLINIC | Age: 73
End: 2023-02-17
Payer: MEDICARE

## 2023-02-17 NOTE — TELEPHONE ENCOUNTER
----- Message from Uma Kiran sent at 2/17/2023  2:07 PM CST -----  Contact: [phone]  Pt is calling she has an appt on 03/30 and will be having eye surgery 04/05 and is needing a clearance to be filled out that day please give return call as to if this can be done for her as well

## 2023-03-21 ENCOUNTER — TELEPHONE (OUTPATIENT)
Dept: ADMINISTRATIVE | Facility: OTHER | Age: 73
End: 2023-03-21
Payer: MEDICARE

## 2023-03-21 NOTE — TELEPHONE ENCOUNTER
Date: Tuesday, March 21, 2023    Trial: [id number]  ID: [id number]  MRN: [id number]  IRB #: [id number]   Initials: C, B     Follow Up 11  Contacted patient regarding vital status. Patient informed me she is no longer interested in participation    Emailed , Edith Mccauley, to inform her of patient's consent withdrawal. I inquired on how to proceed. I am awaiting a response

## 2023-03-21 NOTE — TELEPHONE ENCOUNTER
"Date: Tuesday, March 21, 2023     Trial: [id number]  ID: [id number]  MRN: [id number]  IRB #: [id number]   Initials: C, B      Follow Up 11  Edith Mccauley replied to my e-mail and stated "Thank you for letting me know. According to WILIAM there is nothing further you need to do. I will inactivate CFU 11."  "

## 2023-03-30 ENCOUNTER — OFFICE VISIT (OUTPATIENT)
Dept: INTERNAL MEDICINE | Facility: CLINIC | Age: 73
End: 2023-03-30
Attending: FAMILY MEDICINE
Payer: MEDICARE

## 2023-03-30 VITALS
WEIGHT: 125 LBS | HEIGHT: 68 IN | TEMPERATURE: 98 F | DIASTOLIC BLOOD PRESSURE: 76 MMHG | BODY MASS INDEX: 18.94 KG/M2 | OXYGEN SATURATION: 98 % | SYSTOLIC BLOOD PRESSURE: 120 MMHG | HEART RATE: 100 BPM

## 2023-03-30 DIAGNOSIS — Z00.00 ANNUAL PHYSICAL EXAM: ICD-10-CM

## 2023-03-30 DIAGNOSIS — M81.0 OSTEOPOROSIS, UNSPECIFIED OSTEOPOROSIS TYPE, UNSPECIFIED PATHOLOGICAL FRACTURE PRESENCE: ICD-10-CM

## 2023-03-30 DIAGNOSIS — I70.0 AORTIC ATHEROSCLEROSIS: ICD-10-CM

## 2023-03-30 DIAGNOSIS — Z87.891 PERSONAL HISTORY OF NICOTINE DEPENDENCE: ICD-10-CM

## 2023-03-30 DIAGNOSIS — M85.80 OSTEOPENIA, UNSPECIFIED LOCATION: ICD-10-CM

## 2023-03-30 DIAGNOSIS — T45.1X5A NEUROPATHY DUE TO CHEMOTHERAPEUTIC DRUG: ICD-10-CM

## 2023-03-30 DIAGNOSIS — G62.0 NEUROPATHY DUE TO CHEMOTHERAPEUTIC DRUG: ICD-10-CM

## 2023-03-30 DIAGNOSIS — I10 HYPERTENSION, ESSENTIAL: ICD-10-CM

## 2023-03-30 DIAGNOSIS — R35.0 URINARY FREQUENCY: ICD-10-CM

## 2023-03-30 DIAGNOSIS — N18.31 STAGE 3A CHRONIC KIDNEY DISEASE: ICD-10-CM

## 2023-03-30 DIAGNOSIS — E78.5 HYPERLIPIDEMIA, UNSPECIFIED HYPERLIPIDEMIA TYPE: ICD-10-CM

## 2023-03-30 DIAGNOSIS — C50.412 MALIGNANT NEOPLASM OF UPPER-OUTER QUADRANT OF LEFT BREAST IN FEMALE, ESTROGEN RECEPTOR POSITIVE: ICD-10-CM

## 2023-03-30 DIAGNOSIS — R29.898 WEAKNESS OF BOTH LOWER EXTREMITIES: ICD-10-CM

## 2023-03-30 DIAGNOSIS — Z17.0 MALIGNANT NEOPLASM OF UPPER-OUTER QUADRANT OF LEFT BREAST IN FEMALE, ESTROGEN RECEPTOR POSITIVE: ICD-10-CM

## 2023-03-30 DIAGNOSIS — H26.9 CATARACT, UNSPECIFIED CATARACT TYPE, UNSPECIFIED LATERALITY: ICD-10-CM

## 2023-03-30 DIAGNOSIS — Z01.818 PREOPERATIVE CLEARANCE: Primary | ICD-10-CM

## 2023-03-30 PROCEDURE — 99215 OFFICE O/P EST HI 40 MIN: CPT | Mod: PBBFAC | Performed by: FAMILY MEDICINE

## 2023-03-30 PROCEDURE — 99999 PR PBB SHADOW E&M-EST. PATIENT-LVL V: CPT | Mod: PBBFAC,,, | Performed by: FAMILY MEDICINE

## 2023-03-30 PROCEDURE — 99999 PR PBB SHADOW E&M-EST. PATIENT-LVL V: ICD-10-PCS | Mod: PBBFAC,,, | Performed by: FAMILY MEDICINE

## 2023-03-30 PROCEDURE — 99214 OFFICE O/P EST MOD 30 MIN: CPT | Mod: S$PBB,,, | Performed by: FAMILY MEDICINE

## 2023-03-30 PROCEDURE — 99214 PR OFFICE/OUTPT VISIT, EST, LEVL IV, 30-39 MIN: ICD-10-PCS | Mod: S$PBB,,, | Performed by: FAMILY MEDICINE

## 2023-03-30 RX ORDER — LOSARTAN POTASSIUM 100 MG/1
100 TABLET ORAL DAILY
Qty: 90 TABLET | Refills: 1 | Status: SHIPPED | OUTPATIENT
Start: 2023-03-30 | End: 2024-02-05

## 2023-03-30 RX ORDER — ATORVASTATIN CALCIUM 40 MG/1
40 TABLET, FILM COATED ORAL DAILY
Qty: 90 TABLET | Refills: 1 | Status: SHIPPED | OUTPATIENT
Start: 2023-03-30 | End: 2023-12-12

## 2023-03-30 RX ORDER — ASPIRIN 81 MG/1
81 TABLET ORAL DAILY
COMMUNITY

## 2023-03-30 RX ORDER — AMLODIPINE BESYLATE 10 MG/1
10 TABLET ORAL DAILY
Qty: 90 TABLET | Refills: 1 | Status: SHIPPED | OUTPATIENT
Start: 2023-03-30 | End: 2023-12-29

## 2023-03-30 RX ORDER — HYDROCHLOROTHIAZIDE 12.5 MG/1
12.5 TABLET ORAL EVERY OTHER DAY
Qty: 90 TABLET | Refills: 1 | Status: SHIPPED | OUTPATIENT
Start: 2023-03-30

## 2023-03-30 NOTE — PROGRESS NOTES
Subjective:       Patient ID: Graciela Aranda is a 72 y.o. female.    Chief Complaint: Annual Exam    Patient referred for a preoperative clearance. No acute complaints today.     Specific complaints - see dictation and please see ROS.    Past, Surgical, Family, Social histories; Medications, allergies - reviewed and reconciled.    Health maintenance file reviewed and addressed items due.    Problem list items reviewed and modified or added entries (in the overview section) may not be transcribed into this encounter note due to note writer format.      Cataracts - no CP or Dyspnea    LTFU w/ hematology, did not complete follow-up LDCT.    Still smoking.    Likely moving to [place], [place]    Some leg weakness reported, especially going downstairs.  Chronic intermittent numbness in the feet that has been previously attributed to chemotherapy in the past.  No back pain reported.  No falls reported.  Declines Neurology consult.  Weakness seems to occur with increasing distance?      Review of Systems   Constitutional:  Negative for appetite change, chills, diaphoresis, fatigue and fever.   HENT:  Negative for congestion, postnasal drip, rhinorrhea, sore throat and trouble swallowing.    Eyes:  Positive for visual disturbance.   Respiratory:  Negative for cough, choking, chest tightness, shortness of breath and wheezing.    Cardiovascular:  Negative for chest pain and leg swelling.   Gastrointestinal:  Negative for abdominal distention, abdominal pain, diarrhea, nausea and vomiting.   Genitourinary:  Negative for difficulty urinating and hematuria.   Musculoskeletal:  Negative for arthralgias and myalgias.   Skin:  Negative for rash.   Neurological:  Positive for weakness and numbness. Negative for light-headedness and headaches.   Hematological:  Does not bruise/bleed easily.   Psychiatric/Behavioral:  Negative for decreased concentration and dysphoric mood.      Objective:      Physical Exam  Vitals and nursing note  reviewed.   Constitutional:       Appearance: She is well-developed. She is not diaphoretic.   Eyes:      General: No scleral icterus.  Neck:      Thyroid: No thyromegaly.      Vascular: No JVD.      Trachea: No tracheal deviation.   Cardiovascular:      Rate and Rhythm: Normal rate.      Heart sounds: Heart sounds are distant. No murmur heard.    No friction rub. No gallop.   Pulmonary:      Effort: Pulmonary effort is normal. No respiratory distress.      Breath sounds: Decreased breath sounds present. No wheezing or rales.   Abdominal:      General: There is no distension or abdominal bruit.      Palpations: Abdomen is soft. There is no mass.      Tenderness: There is no abdominal tenderness. There is no guarding or rebound.   Musculoskeletal:      Cervical back: Normal range of motion and neck supple.   Lymphadenopathy:      Cervical: No cervical adenopathy.   Skin:     General: Skin is warm and dry.      Findings: No erythema or rash.   Neurological:      Mental Status: She is alert and oriented to person, place, and time.      Cranial Nerves: No cranial nerve deficit.      Motor: No tremor.      Coordination: Coordination normal.      Gait: Gait normal.   Psychiatric:         Behavior: Behavior normal.         Thought Content: Thought content normal.         Judgment: Judgment normal.       Assessment:       1. Preoperative clearance    2. Annual physical exam    3. Cataract, unspecified cataract type, unspecified laterality    4. Hypertension, essential    5. Hyperlipidemia, unspecified hyperlipidemia type    6. Malignant neoplasm of upper-outer quadrant of left breast in female, estrogen receptor positive    7. Aortic atherosclerosis    8. Stage 3a chronic kidney disease    9. Neuropathy due to chemotherapeutic drug    10. Urinary frequency    11. Personal history of nicotine dependence    12. Weakness of both lower extremities    13. Osteopenia, unspecified location    14. Osteoporosis, unspecified  osteoporosis type, unspecified pathological fracture presence          Plan:     Medication List with Changes/Refills   Current Medications    ASPIRIN (ECOTRIN) 81 MG EC TABLET    Take 81 mg by mouth once daily.    EPINEPHRINE (EPIPEN) 0.3 MG/0.3 ML ATIN    Inject 0.3 mLs (0.3 mg total) into the muscle daily as needed.   Changed and/or Refilled Medications    Modified Medication Previous Medication    AMLODIPINE (NORVASC) 10 MG TABLET amLODIPine (NORVASC) 10 MG tablet       Take 1 tablet (10 mg total) by mouth once daily.    TAKE 1 TABLET(10 MG) BY MOUTH EVERY DAY    ATORVASTATIN (LIPITOR) 40 MG TABLET atorvastatin (LIPITOR) 20 MG tablet       Take 1 tablet (40 mg total) by mouth once daily.    Take 1 tablet (20 mg total) by mouth once daily.    HYDROCHLOROTHIAZIDE (HYDRODIURIL) 12.5 MG TAB hydroCHLOROthiazide (HYDRODIURIL) 12.5 MG Tab       Take 1 tablet (12.5 mg total) by mouth every other day.    TAKE 1 TABLET BY MOUTH EVERY OTHER DAY    LOSARTAN (COZAAR) 100 MG TABLET losartan (COZAAR) 100 MG tablet       Take 1 tablet (100 mg total) by mouth once daily.    TAKE 1 TABLET BY MOUTH EVERY DAY     1. Preoperative clearance    2. Annual physical exam    3. Cataract, unspecified cataract type, unspecified laterality    4. Hypertension, essential  -     atorvastatin (LIPITOR) 40 MG tablet; Take 1 tablet (40 mg total) by mouth once daily.  Dispense: 90 tablet; Refill: 1  -     amLODIPine (NORVASC) 10 MG tablet; Take 1 tablet (10 mg total) by mouth once daily.  Dispense: 90 tablet; Refill: 1  -     hydroCHLOROthiazide (HYDRODIURIL) 12.5 MG Tab; Take 1 tablet (12.5 mg total) by mouth every other day.  Dispense: 90 tablet; Refill: 1  -     losartan (COZAAR) 100 MG tablet; Take 1 tablet (100 mg total) by mouth once daily.  Dispense: 90 tablet; Refill: 1    5. Hyperlipidemia, unspecified hyperlipidemia type    6. Malignant neoplasm of upper-outer quadrant of left breast in female, estrogen receptor  positive  Overview:  -[id number] - bilateral mastectomy. XRT and Chemo   -followed by H/O and summarized in their encounter notes.  -Completed 5 years of Arimidex    Orders:  -     Ambulatory referral/consult to Hematology / Oncology; Future; Expected date: 04/06/2023  -     CT Chest Lung Screening Low Dose; Future; Expected date: 03/30/2023    7. Aortic atherosclerosis  Overview:  -LDCT 10/28/2021 - on statin      8. Stage 3a chronic kidney disease    9. Neuropathy due to chemotherapeutic drug  Overview:  -breast cancer - [id number] - bilateral mastectomy. XRT and Chemo   -followed by H/O and summarized in their encounter notes.  -Completed 5 years of Arimidex    Assessment & Plan:  Declines neurology consult      10. Urinary frequency  -     Urinalysis; Future; Expected date: 03/30/2023  -     Urinalysis Microscopic; Future; Expected date: 03/30/2023    11. Personal history of nicotine dependence  Overview:  -1995 to current  -declined LDCT (9/24/2020), but did get scan in 2021 for oncology (LTFU for repeat)    Orders:  -     CT Chest Lung Screening Low Dose; Future; Expected date: 03/30/2023  -     Ambulatory referral/consult to Smoking Cessation Program; Future; Expected date: 04/06/2023    12. Weakness of both lower extremities  -     Segmental Pressure Lower Extremity    13. Osteopenia, unspecified location    14. Osteoporosis, unspecified osteoporosis type, unspecified pathological fracture presence  -     Ambulatory referral/consult to Endocrinology; Future; Expected date: 04/06/2023      See meds, orders, follow up, routing and instructions sections of encounter and AVS. Discussed with patient and provided on AVS.    Discussed diet and exercise and links provided on AVS for detailed information.  Lab Results   Component Value Date     01/12/2023    K 4.2 01/12/2023     01/12/2023    BUN 22 01/12/2023    CREATININE 1.2 01/12/2023     (H) 01/12/2023    HGBA1C 5.2 01/12/2023    MG 2.1  03/31/2015    AST 29 01/12/2023    ALT 38 01/12/2023    ALBUMIN 4.5 01/12/2023    PROT 7.9 01/12/2023    BILITOT 1.1 (H) 01/12/2023    CHOL 225 (H) 01/12/2023     (H) 01/12/2023    LDLCALC 102.8 01/12/2023    TRIG 81 01/12/2023    WBC 7.32 09/13/2021    HGB 14.0 09/13/2021    HCT 39.8 09/13/2021     09/13/2021    TSH 0.873 04/26/2016       Surgical Risk Assessment     Active cardiac issues:  Active decompensated heart failure? No   Unstable angina?  No   Significant uncontrolled arrhythmias? No   Severe valvular heart disease-Aortic or Mitral Stenosis? No   Recent MI or coronary revascularization < 30 days? No     Clinical risk factors predicting perioperative major adverse cardiac events per RCRI  High risk surgery (suprainguinal vascular, intraperitoneal, or intrathoracic surgery)? No   History of CAD/ischemic heart disease? Yes   History of cerebrovascular disease (CVA or TIA)? No   History of compensated heart failure? No   Type 2 diabetes requiring insulin? No   Serum Creatinine > 2? No   Total cardiac risk factors 1     0 predictors = 3.9%, 1 predictor = 6.0%, 2 predictors = 10.1%, =3 predictors = >15%    According to the revised cardiac risk index, the risk of jorge alberto-procedural major cardiac complications (cardiac death, nonfatal MI, nonfatal cardiac arrest, postoperative cardiogenic pulmonary edema, complete heart block) is: 6 .     From Ducpe 2017, based on pooled data from 5 high quality external validations (4 prospective). These numbers are higher than those often quoted from the now-outdated original study (Camacho 1999).    If patient has a low risk of MACE (<1%), proceed to surgery. If the patient is at elevated risk of MACE, then determine functional capacity (pt reported activity or DASI model).     If the patient has moderate, good, or excellent functional capacity (=4 METs), then proceed to surgery without further evaluation. If patient has poor or unknown functional capacity, will  further testing impact decision making or perioperative care? If yes, then pharmacological stress testing is appropriate. In those patients with unknown functional capacity, exercise stress testing may be reasonable to perform.     Patient's functional mets: 4    < 4 METs -unable to walk > 2 blocks on level ground without stopping due to symptoms  - eating, dressing, toileting, walking indoors, light housework. POOR   > 4 METs -climbing > 1 flight of stairs without stopping  -walking up hill > 1-2 blocks  -scrubbing floors  -moving furniture  - golf, bowling, dancing or tennis  -running short distance MODERATE to EXCELLENT     OR   [email or web address]-[email or web address]-[email or web address]      Clear for surgery and cc to referring physician. Will attach AVS - MAC for cataradt (low risk)

## 2023-07-28 ENCOUNTER — TELEPHONE (OUTPATIENT)
Dept: INTERNAL MEDICINE | Facility: CLINIC | Age: 73
End: 2023-07-28
Payer: MEDICARE

## 2023-07-28 NOTE — TELEPHONE ENCOUNTER
----- Message from Lorena Pollock sent at 7/28/2023  9:51 AM CDT -----  Contact: [phone]  Patient would like to get a referral.  Referral to what specialty:  Physical therapy   Does the patient want the referral with a specific physician:    [facility]  Reason (be specific):  Neuropathy  Does the patient already have the specialty clinic appointment scheduled:  no  If yes, what date is the appointment scheduled:     Is the insurance listed in Epic correct? (this is important for a referral):  yes  Advised patient that once provider approves this either a nurse or  will return their call?: yes  Would the patient like a call back, or a response through their [facility] portal?:   phone  Comments:  patient does not want to be e-mail patient just had surgery. Please call and advise. Thank you

## 2023-07-31 NOTE — TELEPHONE ENCOUNTER
Please call patient and schedule patient for an appointment with me, need more information, physical therapy is not a typical treatment for neuropathy.. Thank you.

## 2023-08-01 ENCOUNTER — TELEPHONE (OUTPATIENT)
Dept: INTERNAL MEDICINE | Facility: CLINIC | Age: 73
End: 2023-08-01
Payer: MEDICARE

## 2023-08-01 NOTE — TELEPHONE ENCOUNTER
----- Message from Mary Giang sent at 8/1/2023 11:07 AM CDT -----  Contact: patient @ [phone]  Patient is returning a phone call.  Who left a message for the patient: Cynthia Erazo MA   Does patient know what this is regarding:    Would you like a call back, or a response through your [facility] portal?:   call   Comments:

## 2023-09-25 ENCOUNTER — TELEPHONE (OUTPATIENT)
Dept: DERMATOLOGY | Facility: CLINIC | Age: 73
End: 2023-09-25
Payer: MEDICARE

## 2023-09-25 NOTE — TELEPHONE ENCOUNTER
Called for color photo of biopsy site (BCC nasal tip).  stated she will have Dr. Bolaños's staff send the photo

## 2023-09-27 ENCOUNTER — TELEPHONE (OUTPATIENT)
Dept: DERMATOLOGY | Facility: CLINIC | Age: 73
End: 2023-09-27
Payer: MEDICARE

## 2023-09-27 NOTE — TELEPHONE ENCOUNTER
NP is a referral from Dr. Bolaños with BCC on nasal tip. Scheduled for same day mohs on 11/8 at 800 am. Over the phone consult completed. Pt confirmed date, time, and location. New patient packet sent in the mail.

## 2023-09-27 NOTE — TELEPHONE ENCOUNTER
----- Message from Ange Barone sent at 9/27/2023  3:19 PM CDT -----  Regarding: Pt Appt  Contact: [phone]  Missed Callback         Pt returning callback from missed call. Requesting to speak with somebody in Dr. Zelaya office. Please call [phone].

## 2023-11-08 ENCOUNTER — PROCEDURE VISIT (OUTPATIENT)
Dept: DERMATOLOGY | Facility: CLINIC | Age: 73
End: 2023-11-08
Payer: MEDICARE

## 2023-11-08 VITALS
SYSTOLIC BLOOD PRESSURE: 139 MMHG | BODY MASS INDEX: 18.94 KG/M2 | DIASTOLIC BLOOD PRESSURE: 89 MMHG | HEIGHT: 68 IN | HEART RATE: 76 BPM | WEIGHT: 125 LBS

## 2023-11-08 DIAGNOSIS — C44.311 BASAL CELL CARCINOMA OF NASAL TIP: Primary | ICD-10-CM

## 2023-11-08 PROCEDURE — 17311: ICD-10-PCS | Mod: S$PBB,51,ICN, | Performed by: DERMATOLOGY

## 2023-11-08 PROCEDURE — 17312 MOHS ADDL STAGE: CPT | Mod: PBBFAC,ICN | Performed by: DERMATOLOGY

## 2023-11-08 PROCEDURE — 99499 UNLISTED E&M SERVICE: CPT | Mod: S$PBB,,, | Performed by: DERMATOLOGY

## 2023-11-08 PROCEDURE — 17312: ICD-10-PCS | Mod: S$PBB,ICN,, | Performed by: DERMATOLOGY

## 2023-11-08 PROCEDURE — 17312 MOHS ADDL STAGE: CPT | Mod: S$PBB,ICN,, | Performed by: DERMATOLOGY

## 2023-11-08 PROCEDURE — 17311 MOHS 1 STAGE H/N/HF/G: CPT | Mod: S$PBB,51,ICN, | Performed by: DERMATOLOGY

## 2023-11-08 PROCEDURE — 14060 PR ADJ TISS XFER LID,NOS,EAR <10 SQCM: ICD-10-PCS | Mod: S$PBB,ICN,, | Performed by: DERMATOLOGY

## 2023-11-08 PROCEDURE — 14060 TIS TRNFR E/N/E/L 10 SQ CM/<: CPT | Mod: PBBFAC,ICN | Performed by: DERMATOLOGY

## 2023-11-08 PROCEDURE — 99499 NO LOS: ICD-10-PCS | Mod: S$PBB,,, | Performed by: DERMATOLOGY

## 2023-11-08 PROCEDURE — 17311 MOHS 1 STAGE H/N/HF/G: CPT | Mod: PBBFAC,51,ICN | Performed by: DERMATOLOGY

## 2023-11-08 PROCEDURE — 14060 TIS TRNFR E/N/E/L 10 SQ CM/<: CPT | Mod: S$PBB,ICN,, | Performed by: DERMATOLOGY

## 2023-11-08 RX ORDER — HYDROCODONE BITARTRATE AND ACETAMINOPHEN 5; 325 MG/1; MG/1
1 TABLET ORAL EVERY 6 HOURS PRN
Qty: 10 TABLET | Refills: 0 | Status: SHIPPED | OUTPATIENT
Start: 2023-11-08

## 2023-11-08 RX ORDER — CEPHALEXIN 500 MG/1
500 CAPSULE ORAL 3 TIMES DAILY
Qty: 21 CAPSULE | Refills: 0 | Status: SHIPPED | OUTPATIENT
Start: 2023-11-08 | End: 2023-11-15

## 2023-11-08 NOTE — PROGRESS NOTES
New patient with a couple of years h/o growth on the nasal tip. (+) growing, crusted after biopsy. Denies bleeding, scabbing. Biopsy c/w BCC. No prior treatment. (+) smokes 1-2 ppd since late 1990's.    Physical Exam   HENT:   Nose:         L nasal supratip with a 7 x 7 mm pink pearly papule located 5.5 cm inferiorly from the mid-glabella and 3.5 cm superiorly from the left alar base.    Biopsy proven nodular basal cell carcinoma- Nasal tip, path# [id number].  Diagnosis, photograph, and pathology report were reviewed with patient.  Discussed risks, benefits, and alternatives of Mohs surgery.  Discussed repair options including complex closure, skin flap, skin graft, and second intention healing.  Strongly advised pt to stop smoking.  Patient agreed to proceed with Mohs surgery today.      PROCEDURE: Mohs' Micrographic Surgery    INDICATION: Location in mask areas of face including central face, nose, eyelids, eyebrows, lips, chin, preauricular, temple, and ear. Biopsy-proven skin cancer of cosmetically and functionally important areas, including head, neck, genital, hand, foot, or areas known for having difficulty in healing, such as the lower anterior legs. Tumors with aggressive clinical behavior (rapidly growing, greater than 1 cm in diameter). Tumor with ill-defined borders.    REFERRING PROVIDER:  Lauren Bolaños MD    CASE NUMBER:     ANESTHETIC: 2.5 cc 0.5% Lidocaine with Epi 1:200,000 mixed 1:1 with 0.5% Bupivacaine    SURGICAL PREP: Hibiclens    SURGEON: Bradford Zelaya MD    ASSISTANTS: Rhea Bojorquez PA-C, Brooke Gibson MA, and Ronel Carter, [facility] Tech    PREOPERATIVE DIAGNOSIS: basal cell carcinoma- nodular    POSTOPERATIVE DIAGNOSIS: basal cell carcinoma- nodular, infiltrating    PATHOLOGIC DIAGNOSIS: basal cell carcinoma- nodular, infiltrating    HISTOLOGY OF SPECIMENS IN FIRST STAGE:   Debulking tumor confirms nodular and infiltrating basal cell carcinoma.  Tumor Type: Tumor seen. Nodular basal cell  carcinoma: Nodular tumor in dermis composed of basaloid cells exhibiting peripheral palisading and retraction artifact.  Infiltrative basal cell carcinoma: Basaloid tumor in dermis characterized by thin elongated strands of basaloid cells infiltrating between dermal collagen bundles.   Depth of Invasion: subcutaneous tissue  Perineural Invasion: No    HISTOLOGY OF SPECIMENS IN SUBSEQUENT STAGES:  Tumor Type:  No tumor seen.    STAGES OF MOHS' SURGERY PERFORMED: 2    TUMOR-FREE PLANE ACHIEVED: Yes - with deep tissue removal    HEMOSTASIS: electrocoagulation     SPECIMENS: 3 (2 in stage A and 1 in stage B)    LOCATION: nasal tip (left supratip). Location verified with Dr. Bolaños's clinical photograph. Patient also verified location with hand held mirror.    INITIAL LESION SIZE: 0.7 x 0.7 cm    FINAL DEFECT SIZE: 0.9 x 1.1 cm    WOUND REPAIR/DISPOSITION: The patient tolerated Mohs' Micrographic Surgery for a basal cell carcinoma very well. When the tumor was completely removed, a repair of the surgical defect was undertaken.    PROCEDURE: Burow's Advancement Flap (Adjacent Tissue Transfer)    INDICATION: Status post Mohs' Micrographic Surgery for basal cell carcinoma.    CASE NUMBER:     ANESTHETIC: 1.5 cc 1% Lidocaine with Epinephrine 1:200,000    SURGICAL PREP: Hibiclens, prepped by Rhea Bojorquez PA-C    SURGEON: Bradford Zelaya MD    ASSISTANTS: Rhea Bojorquez PA-C and Ronel Carter Surg Tech    LOCATION: nasal tip (left supratip)    DEFECT SIZE: 0.9 x 1.1 cm    WOUND REPAIR/DISPOSITION:  After the patient's carcinoma had been completely removed with Mohs' Micrographic Surgery, a repair of the surgical defect was undertaken. The patient was returned to the operating suite where the area of left nasal supratip was prepped, draped, and anesthetized in the usual sterile fashion. A Burow's advancement flap was designed in the inferior surrounding tissue of the left supratip. A Burow's triangle was drawn in superiorly to  "help with tissue movement. Then with a #15 blade the flap was incised and the Burow's triangle was excised, the area was widely undermined in the submuscular tissue plane. Then, electrocoagulation was used to obtain meticulous hemostasis. A 5-0 Vicryl pexing suture was performed by attaching the underside of the most medial aspect of the flap to the nasal supratip. The flap was then advanced in by a complex closure. Then, 5-0 Vicryl buried vertical mattress sutures were placed into the subcutaneous and dermal plane to close the wound and nighat the cutaneous wound edge. The flap was then trimmed to fit the defect. The cutaneous wound edges were closed using interrupted 5-0 Prolene sutures.    The patient tolerated the procedure well.    The area was cleaned and dressed appropriately and the patient was given wound care instructions, as well as an appointment for follow-up evaluation and suture removal in 7 days. Patient was placed on Norco 5-325 mg prn postop pain and Keflex 500 mg TID x 7 days.    SIZE OF FLAP: 8 cm squared    Vitals:    11/08/23 0810 11/08/23 1127   BP: 133/71 139/89   BP Location: Right arm    Patient Position: Sitting    BP Method: Medium (Automatic)    Pulse: 88 76   Weight: 56.7 kg (125 lb)    Height: 5' 8" (1.727 m)        "

## 2023-11-15 ENCOUNTER — OFFICE VISIT (OUTPATIENT)
Dept: DERMATOLOGY | Facility: CLINIC | Age: 73
End: 2023-11-15
Payer: MEDICARE

## 2023-11-15 ENCOUNTER — TELEPHONE (OUTPATIENT)
Dept: DERMATOLOGY | Facility: CLINIC | Age: 73
End: 2023-11-15
Payer: MEDICARE

## 2023-11-15 DIAGNOSIS — Z09 POSTOP CHECK: Primary | ICD-10-CM

## 2023-11-15 PROCEDURE — 99212 OFFICE O/P EST SF 10 MIN: CPT | Mod: PBBFAC | Performed by: DERMATOLOGY

## 2023-11-15 PROCEDURE — 99999 PR PBB SHADOW E&M-EST. PATIENT-LVL II: ICD-10-PCS | Mod: PBBFAC,,, | Performed by: DERMATOLOGY

## 2023-11-15 PROCEDURE — 99024 PR POST-OP FOLLOW-UP VISIT: ICD-10-PCS | Mod: POP,,, | Performed by: DERMATOLOGY

## 2023-11-15 PROCEDURE — 99024 POSTOP FOLLOW-UP VISIT: CPT | Mod: POP,,, | Performed by: DERMATOLOGY

## 2023-11-15 PROCEDURE — 99999 PR PBB SHADOW E&M-EST. PATIENT-LVL II: CPT | Mod: PBBFAC,,, | Performed by: DERMATOLOGY

## 2023-11-15 NOTE — PROGRESS NOTES
73 y.o. female patient is here for suture removal following Mohs' surgery.    Patient reports no problems with nasal tip incision.    WOUND PE:  The nasal tip sutures intact. Wound healing well. Good skin edges. No signs or symptoms of infection. Flap is pink and viable.       IMPRESSION:  Healing operative site from Mohs' surgery BCC nasal tip s/p Mohs with Burow's Advancement Flap, postop day #7.    PLAN:  Sutures removed today by  Nohemi Jones RN . Steri-strips applied.  Continue wound care.  Keep moist with Aquaphor.  Call if any issues arise    RTC:  In 1 month

## 2023-12-29 DIAGNOSIS — I10 HYPERTENSION, ESSENTIAL: ICD-10-CM

## 2023-12-29 RX ORDER — AMLODIPINE BESYLATE 10 MG/1
10 TABLET ORAL
Qty: 90 TABLET | Refills: 0 | Status: SHIPPED | OUTPATIENT
Start: 2023-12-29 | End: 2024-03-28

## 2023-12-29 NOTE — TELEPHONE ENCOUNTER
Refill Decision Note   Graciela Aranda  is requesting a refill authorization.    Brief Assessment and Rationale for Refill:  Approve       Medication Therapy Plan:         Comments:     Note composed:1:43 PM 12/29/2023

## 2023-12-29 NOTE — TELEPHONE ENCOUNTER
No care due was identified.  [facility] Embedded Care Due Messages. Reference number: [id number].   12/29/2023 5:25:14 AM CST

## 2024-01-11 DIAGNOSIS — Z00.00 ENCOUNTER FOR MEDICARE ANNUAL WELLNESS EXAM: ICD-10-CM

## 2024-02-05 DIAGNOSIS — I10 HYPERTENSION, ESSENTIAL: ICD-10-CM

## 2024-02-05 RX ORDER — LOSARTAN POTASSIUM 100 MG/1
100 TABLET ORAL DAILY
Qty: 90 TABLET | Refills: 0 | Status: SHIPPED | OUTPATIENT
Start: 2024-02-05 | End: 2024-03-30

## 2024-02-05 NOTE — TELEPHONE ENCOUNTER
Refill Routing Note   Medication(s) are not appropriate for processing by [facility] for the following reason(s):        Required labs outdated: CMP    [facility] action(s):  Defer     Requires labs : Yes    Medication Therapy Plan:         Appointments  past 12m or future 3m with PCP    Date Provider   Last Visit   3/30/2023 Moy Patel MD   Next Visit   Visit date not found Moy Patel MD   ED visits in past 90 days: 0        Note composed:3:36 PM 02/05/2024

## 2024-02-05 NOTE — TELEPHONE ENCOUNTER
No care due was identified.  [facility] Embedded Care Due Messages. Reference number: [id number].   2/05/2024 12:39:48 PM CST

## 2024-02-21 ENCOUNTER — TELEPHONE (OUTPATIENT)
Dept: HEMATOLOGY/ONCOLOGY | Facility: CLINIC | Age: 74
End: 2024-02-21
Payer: MEDICARE

## 2024-02-21 NOTE — TELEPHONE ENCOUNTER
"Called pt no answer.   Can see pt at [facility]. First available 2/26 8am.     Left voicemail to return call.     ----- Message from Dario Stein sent at 2/21/2024 12:02 PM CST -----  Scheduling Request        Patient Status: Est      Scheduling Appt: F/u per PCP recommendation      Time/Date Preference:  Soonest available      Contact Preference?: [phone]      Treating Provider:  Karina      Do you feel you need to be seen today? No      Additional Notes:  "Thank you for all that you do for our patients"       "

## 2024-02-21 NOTE — TELEPHONE ENCOUNTER
Pt scheduled for 10am on 3/4 at [facility] per request.    ----- Message from Crystal Kimball sent at 2/21/2024  1:06 PM CST -----  Regarding: Returning a Missed Call      Caller:    Graciela      Returning call to:    Mary       Caller can be reached @:   [phone]       Nature of the call:    Pt would like to schedule an appt with Dr. Collins per PCP request.

## 2024-03-04 ENCOUNTER — OFFICE VISIT (OUTPATIENT)
Dept: HEMATOLOGY/ONCOLOGY | Facility: CLINIC | Age: 74
End: 2024-03-04
Payer: MEDICARE

## 2024-03-04 VITALS
OXYGEN SATURATION: 97 % | RESPIRATION RATE: 16 BRPM | TEMPERATURE: 98 F | HEART RATE: 69 BPM | HEIGHT: 68 IN | DIASTOLIC BLOOD PRESSURE: 55 MMHG | BODY MASS INDEX: 19.01 KG/M2 | SYSTOLIC BLOOD PRESSURE: 121 MMHG

## 2024-03-04 DIAGNOSIS — Z87.891 PERSONAL HISTORY OF NICOTINE DEPENDENCE: ICD-10-CM

## 2024-03-04 DIAGNOSIS — Z85.3 HISTORY OF BREAST CANCER: ICD-10-CM

## 2024-03-04 DIAGNOSIS — Z85.3 HISTORY OF BREAST CANCER: Primary | ICD-10-CM

## 2024-03-04 PROCEDURE — 99213 OFFICE O/P EST LOW 20 MIN: CPT | Mod: S$PBB,,, | Performed by: INTERNAL MEDICINE

## 2024-03-04 PROCEDURE — 99213 OFFICE O/P EST LOW 20 MIN: CPT | Mod: PBBFAC | Performed by: INTERNAL MEDICINE

## 2024-03-04 PROCEDURE — 99999 PR PBB SHADOW E&M-EST. PATIENT-LVL III: CPT | Mod: PBBFAC,,, | Performed by: INTERNAL MEDICINE

## 2024-03-04 NOTE — PROGRESS NOTES
Subjective     Patient ID: Graciela Aranda is a 73 y.o. female.    Chief Complaint: Malignant neoplasm of upper-outer quadrant of left breast i    HPI    I have not seen her since 2021- she is > 5 years out and no longer requires follow up however  She has not done interval screening CTs- states she missed her last appt    This is a 73 year old female with a history of T2N1 ER/ME + left breast cancer,  Has completed adjuvant Arimidex  Returns for follow up  Still smoking-- completed smoking cessation multiple times  Weight down and she associates with stress  Denies pain     Oncology History:  The patient is a 73-year-old female with left invasive breast cancer (T2 N1 M0) ER+, ME+, Her2 negative with positive sentinel lymph node from left axilla who completed 4 cycles of AC and 4 cycles of Taxol in the neoadjuvant setting.  Post bilateral mastectomy on 05/29/2015 with Dr. Monte. Her final pathology revealed:  The left breast had a 2.2 cm of residual invasive ductal carcinoma of overall grade 2 disease. Surgical margins were negative.  The right breast revealed no evidence of invasive carcinoma. +ADH  There were six left sentinel lymph nodes sampled within four sentinel lymph node specimens;  three of these lymph nodes were positive, all of which were noted intraoperatively to be positive. The sizes of the metastatic foci were 3 mm, 2    mm and 3 mm respectively.   She had been consented for the NSABP B-51, an [place] trial with the positive lymph node intraoperatively. She was randomized to no    axillary lymph node dissection and will be proceeding with adjuvant left axillary radiotherapy.  She was initially scheduled for a left total complete mastectomy without reconstruction and with sentinel lymph node biopsy 11/03/2014. But the preop MRI, revealed a contralateral suspicious right-sided abnormality located at the 3 o'clock position of the right breast. The MRI also revealed a suspicious lymph node in the left  axilla. We obtained target ultrasound of both areas, which confirmed the areas of abnormality in both the right medial breast at the 3 o'clock position as well as a 15 mm left axillary lymph node.She has undergone ultrasound-guided core needle biopsy of the right medial breast mass as well as the left axillary lymph node.   The pathology from right breast mass show fibroadenoma but axillary lymph nodes is positive for involvement with adenocarcinoma ER/NH positive, HER-2/anderson negative tumor.    Her left-sided breast cancer also revealed an ER/NH positive, HER-2/anderson negative tumor that was approximately 5 cm on clinical presentation and on MRI, this mass also measured 41 mm in the middle lower outer region of the left breast located 5 cm from the nipple.       Health maintenance:  BMD 3/2019 - due in March 2021     Colonoscopy - Had her colonoscopy in the interval which revealed 1 - 3mm polyp that was removed, hyperplastic on pathology. Repeat recommended in 5 years (2021)-- declines    Review of Systems   Constitutional:  Negative for activity change, appetite change, diaphoresis, fatigue, fever and unexpected weight change.   HENT:  Negative for nasal congestion, sinus pressure/congestion, sore throat and trouble swallowing.    Respiratory:  Negative for cough, shortness of breath and wheezing.    Cardiovascular:  Negative for chest pain, palpitations and leg swelling.   Gastrointestinal:  Negative for abdominal distention, abdominal pain, blood in stool, constipation, diarrhea, nausea and vomiting.   Genitourinary:  Negative for decreased urine volume, difficulty urinating, dysuria and urgency.   Musculoskeletal:  Negative for arthralgias.   Integumentary:  Negative for breast mass.   Neurological:  Negative for weakness, light-headedness, numbness and headaches.   Hematological:  Negative for adenopathy. Does not bruise/bleed easily.   Psychiatric/Behavioral:  Negative for dysphoric mood. The patient is  nervous/anxious.    Breast: Negative for mass         Objective     Physical Exam  Vitals and nursing note reviewed.   Constitutional:       General: She is not in acute distress.     Appearance: Normal appearance. She is well-developed and normal weight. She is not ill-appearing.      Comments: Presents alone  Highly anxious, thin  ECOG= 0    HENT:      Head: Normocephalic and atraumatic.   Eyes:      General: No scleral icterus.     Extraocular Movements: Extraocular movements intact.      Conjunctiva/sclera: Conjunctivae normal.      Pupils: Pupils are equal, round, and reactive to light.   Cardiovascular:      Rate and Rhythm: Normal rate and regular rhythm.      Heart sounds: Normal heart sounds. No murmur heard.     No friction rub. No gallop.   Pulmonary:      Effort: Pulmonary effort is normal. No respiratory distress.      Breath sounds: Normal breath sounds. No wheezing, rhonchi or rales.   Abdominal:      General: Abdomen is flat. Bowel sounds are normal. There is no distension.      Palpations: Abdomen is soft. There is no mass.      Tenderness: There is no abdominal tenderness. There is no guarding or rebound.      Comments: No organomegaly   Musculoskeletal:         General: No tenderness or deformity. Normal range of motion.      Cervical back: Normal range of motion and neck supple.   Lymphadenopathy:      Head:      Right side of head: No submental or submandibular adenopathy.      Left side of head: No submental or submandibular adenopathy.      Cervical: No cervical adenopathy.   Skin:     General: Skin is warm and dry.      Coloration: Skin is not jaundiced or pale.      Findings: No erythema or rash.   Neurological:      General: No focal deficit present.      Mental Status: She is alert and oriented to person, place, and time.      Sensory: No sensory deficit.      Motor: No weakness.      Coordination: Coordination normal.      Gait: Gait normal.   Psychiatric:         Behavior: Behavior  normal.         Thought Content: Thought content normal.         Judgment: Judgment normal.      Comments: Anxious     Labs- reviewed     Assessment and Plan     1. History of breast cancer  Overview:  -[id number] - bilateral mastectomy. XRT and Chemo   -followed by H/O and summarized in their encounter notes.  -Completed 5 years of Arimidex      2. Personal history of nicotine dependence  Overview:  -1995 to current  -declined LDCT (9/24/2020), but did get scan in 2021 for oncology (LTFU for repeat)      3. History of breast cancer  Overview:  -[id number] - bilateral mastectomy. XRT and Chemo   -followed by H/O and summarized in their encounter notes.  -Completed 5 years of Arimidex        Tobacco history  Chest CT screening- desires closest location    History breast cancer  Clinically ANU  Needs no further follow up unless clinical concerns arise  Will do CT as above      Route Chart for Scheduling    Med Onc Chart Routing      Follow up with physician No follow up needed. please make sure her screening chest CT scheduled- desires to do as close to where she lives as feasible   Follow up with [facility]    Infusion scheduling note    Injection scheduling note    Labs    Imaging    Pharmacy appointment    Other referrals

## 2024-03-15 DIAGNOSIS — Z87.891 PERSONAL HISTORY OF NICOTINE DEPENDENCE: Primary | ICD-10-CM

## 2024-03-28 DIAGNOSIS — I10 HYPERTENSION, ESSENTIAL: ICD-10-CM

## 2024-03-28 RX ORDER — AMLODIPINE BESYLATE 10 MG/1
10 TABLET ORAL DAILY
Qty: 90 TABLET | Refills: 0 | Status: SHIPPED | OUTPATIENT
Start: 2024-03-28 | End: 2024-06-03 | Stop reason: SDUPTHER

## 2024-03-28 NOTE — TELEPHONE ENCOUNTER
Refill Routing Note   Medication(s) are not appropriate for processing by [facility] for the following reason(s):        Required labs outdated    [facility] action(s):  Defer  Approve               Appointments  past 12m or future 3m with PCP    Date Provider   Last Visit   3/30/2023 Moy Patel MD   Next Visit   5/24/2024 Moy Patel MD   ED visits in past 90 days: 0        Note composed:3:14 PM 03/28/2024

## 2024-03-28 NOTE — TELEPHONE ENCOUNTER
No care due was identified.  [facility] Embedded Care Due Messages. Reference number: [id number].   3/28/2024 5:20:01 AM CDT

## 2024-03-30 RX ORDER — LOSARTAN POTASSIUM 100 MG/1
100 TABLET ORAL DAILY
Qty: 90 TABLET | Refills: 0 | Status: SHIPPED | OUTPATIENT
Start: 2024-03-30 | End: 2024-06-03 | Stop reason: SDUPTHER

## 2024-05-20 ENCOUNTER — TELEPHONE (OUTPATIENT)
Dept: INTERNAL MEDICINE | Facility: CLINIC | Age: 74
End: 2024-05-20
Payer: MEDICARE

## 2024-05-20 DIAGNOSIS — R74.8 ELEVATED LIVER ENZYMES: Primary | ICD-10-CM

## 2024-05-21 NOTE — TELEPHONE ENCOUNTER
Please call patient and explain that the test(s) show mild elevation of liver function tests.    I would like to refer to the hepatology department for further evaluation and treatment.    Please see referral orders and please call patient to schedule.     Thank you.

## 2024-06-03 ENCOUNTER — OFFICE VISIT (OUTPATIENT)
Dept: INTERNAL MEDICINE | Facility: CLINIC | Age: 74
End: 2024-06-03
Attending: FAMILY MEDICINE
Payer: MEDICARE

## 2024-06-03 VITALS
HEART RATE: 87 BPM | BODY MASS INDEX: 19.56 KG/M2 | DIASTOLIC BLOOD PRESSURE: 60 MMHG | SYSTOLIC BLOOD PRESSURE: 116 MMHG | OXYGEN SATURATION: 90 % | WEIGHT: 132.06 LBS | HEIGHT: 69 IN

## 2024-06-03 DIAGNOSIS — I10 HYPERTENSION, ESSENTIAL: Primary | ICD-10-CM

## 2024-06-03 DIAGNOSIS — Z87.891 PERSONAL HISTORY OF NICOTINE DEPENDENCE: ICD-10-CM

## 2024-06-03 DIAGNOSIS — Z12.11 COLON CANCER SCREENING: ICD-10-CM

## 2024-06-03 DIAGNOSIS — I70.0 AORTIC ATHEROSCLEROSIS: ICD-10-CM

## 2024-06-03 DIAGNOSIS — E78.5 HYPERLIPIDEMIA, UNSPECIFIED HYPERLIPIDEMIA TYPE: ICD-10-CM

## 2024-06-03 DIAGNOSIS — T45.1X5A NEUROPATHY DUE TO CHEMOTHERAPEUTIC DRUG: ICD-10-CM

## 2024-06-03 DIAGNOSIS — G62.0 NEUROPATHY DUE TO CHEMOTHERAPEUTIC DRUG: ICD-10-CM

## 2024-06-03 DIAGNOSIS — K63.5 POLYP OF COLON, UNSPECIFIED PART OF COLON, UNSPECIFIED TYPE: ICD-10-CM

## 2024-06-03 DIAGNOSIS — Z85.3 HISTORY OF BREAST CANCER: ICD-10-CM

## 2024-06-03 PROBLEM — Z79.811 AROMATASE INHIBITOR USE: Status: RESOLVED | Noted: 2019-08-27 | Resolved: 2024-06-03

## 2024-06-03 PROBLEM — N18.31 STAGE 3A CHRONIC KIDNEY DISEASE: Status: RESOLVED | Noted: 2020-02-26 | Resolved: 2024-06-03

## 2024-06-03 PROCEDURE — 99999 PR PBB SHADOW E&M-EST. PATIENT-LVL IV: CPT | Mod: PBBFAC,,, | Performed by: FAMILY MEDICINE

## 2024-06-03 PROCEDURE — 99214 OFFICE O/P EST MOD 30 MIN: CPT | Mod: S$PBB,,, | Performed by: FAMILY MEDICINE

## 2024-06-03 PROCEDURE — 99214 OFFICE O/P EST MOD 30 MIN: CPT | Mod: PBBFAC | Performed by: FAMILY MEDICINE

## 2024-06-03 RX ORDER — ATORVASTATIN CALCIUM 40 MG/1
40 TABLET, FILM COATED ORAL DAILY
Qty: 90 TABLET | Refills: 3 | Status: SHIPPED | OUTPATIENT
Start: 2024-06-03

## 2024-06-03 RX ORDER — HYDROCHLOROTHIAZIDE 12.5 MG/1
12.5 TABLET ORAL EVERY OTHER DAY
Qty: 90 TABLET | Refills: 3 | Status: SHIPPED | OUTPATIENT
Start: 2024-06-03 | End: 2024-06-03 | Stop reason: SINTOL

## 2024-06-03 RX ORDER — LOSARTAN POTASSIUM 100 MG/1
100 TABLET ORAL DAILY
Qty: 90 TABLET | Refills: 3 | Status: SHIPPED | OUTPATIENT
Start: 2024-06-03

## 2024-06-03 RX ORDER — AMLODIPINE BESYLATE 10 MG/1
10 TABLET ORAL DAILY
Qty: 90 TABLET | Refills: 3 | Status: SHIPPED | OUTPATIENT
Start: 2024-06-03

## 2024-06-03 NOTE — PROGRESS NOTES
Subjective:       Patient ID: Graciela Aranda is a 73 y.o. female.    Chief Complaint: Follow-up    Established patient for an annual wellness check/physical exam and also chronic disease management. Specific complaints - see dictation, M*model entries and please see ROS.  Past, Surgical, Family, Social Histories; Medications, Allergies reviewed and reconciled.  Health maintenance file reviewed and addressed items due. Recent applicable lab, imaging and cardiovascular results reviewed.  Problem list items reviewed and modified or added entries (in the overview section) may not be transcribed into this encounter note due to note writer format.        Established patient follows up for management of chronic medical illnesses with complaints today. Please see dictation and ROS for interval problems, specific complaints and disease management discussion.    Past, Surgical, Family, Social, Histories; Medications, allergies reviewed and reconciled.  Health maintenance file reviewed and addressed items due. Recent applicable lab, imaging and cardiovascular results reviewed.  Problem list items reviewed and modified or added entries (in the overview section) may not be transcribed into this encounter note due to note writer format.        NSIHx    Part time in [place]. Likes to avoid Dr. Visits.        Review of Systems   Constitutional:  Negative for appetite change, chills, diaphoresis, fatigue and fever.   HENT:  Negative for congestion, postnasal drip, rhinorrhea, sore throat and trouble swallowing.    Eyes:  Negative for visual disturbance.   Respiratory:  Negative for cough, choking, chest tightness, shortness of breath and wheezing.    Cardiovascular:  Negative for chest pain and leg swelling.   Gastrointestinal:  Negative for abdominal distention, abdominal pain, diarrhea, nausea and vomiting.   Genitourinary:  Negative for difficulty urinating and hematuria.   Musculoskeletal:  Negative for arthralgias and myalgias.    Skin:  Negative for rash.   Neurological:  Positive for numbness. Negative for weakness, light-headedness and headaches.   Hematological:  Does not bruise/bleed easily.   Psychiatric/Behavioral:  Negative for decreased concentration and dysphoric mood.        Objective:      Physical Exam  Vitals and nursing note reviewed.   Constitutional:       Appearance: She is well-developed. She is not diaphoretic.   Eyes:      General: No scleral icterus.  Neck:      Thyroid: No thyromegaly.      Vascular: No JVD.      Trachea: No tracheal deviation.   Cardiovascular:      Rate and Rhythm: Normal rate.      Heart sounds: Heart sounds are distant. No murmur heard.     No friction rub. No gallop.   Pulmonary:      Effort: Pulmonary effort is normal. No respiratory distress.      Breath sounds: Decreased breath sounds present. No wheezing or rales.   Abdominal:      General: There is no distension or abdominal bruit.      Palpations: Abdomen is soft. There is no mass.      Tenderness: There is no abdominal tenderness. There is no guarding or rebound.   Musculoskeletal:      Cervical back: Normal range of motion and neck supple.   Lymphadenopathy:      Cervical: No cervical adenopathy.   Skin:     General: Skin is warm and dry.      Findings: No erythema or rash.   Neurological:      Mental Status: She is alert and oriented to person, place, and time.      Cranial Nerves: No cranial nerve deficit.      Motor: No tremor.      Coordination: Coordination normal.      Gait: Gait normal.   Psychiatric:         Behavior: Behavior normal.         Thought Content: Thought content normal.         Judgment: Judgment normal.         Assessment:       1. Hypertension, essential    2. Aortic atherosclerosis    3. Neuropathy due to chemotherapeutic drug    4. Hyperlipidemia, unspecified hyperlipidemia type    5. History of breast cancer    6. Polyp of colon, unspecified part of colon, unspecified type    7. Colon cancer screening    8.  Personal history of nicotine dependence        Plan:     Medication List with Changes/Refills   Current Medications    ASPIRIN (ECOTRIN) 81 MG EC TABLET    Take 81 mg by mouth once daily.    EPINEPHRINE (EPIPEN) 0.3 MG/0.3 ML ATIN    Inject 0.3 mLs (0.3 mg total) into the muscle daily as needed.   Changed and/or Refilled Medications    Modified Medication Previous Medication    AMLODIPINE (NORVASC) 10 MG TABLET amLODIPine (NORVASC) 10 MG tablet       Take 1 tablet (10 mg total) by mouth once daily.    Take 1 tablet (10 mg total) by mouth once daily.    ATORVASTATIN (LIPITOR) 40 MG TABLET atorvastatin (LIPITOR) 40 MG tablet       Take 1 tablet (40 mg total) by mouth once daily.    TAKE 1 TABLET(40 MG) BY MOUTH EVERY DAY    LOSARTAN (COZAAR) 100 MG TABLET losartan (COZAAR) 100 MG tablet       Take 1 tablet (100 mg total) by mouth once daily.    Take 1 tablet (100 mg total) by mouth once daily.   Discontinued Medications    HYDROCHLOROTHIAZIDE (HYDRODIURIL) 12.5 MG TAB    Take 1 tablet (12.5 mg total) by mouth every other day.    HYDROCODONE-ACETAMINOPHEN (NORCO) 5-325 MG PER TABLET    Take 1 tablet by mouth every 6 (six) hours as needed for Pain.     1. Hypertension, essential  Assessment & Plan:  -stopped digital program  -stopped HCTZ (incont)    Orders:  -     Comprehensive Metabolic Panel; Future; Expected date: 06/03/2024  -     amLODIPine (NORVASC) 10 MG tablet; Take 1 tablet (10 mg total) by mouth once daily.  Dispense: 90 tablet; Refill: 3  -     atorvastatin (LIPITOR) 40 MG tablet; Take 1 tablet (40 mg total) by mouth once daily.  Dispense: 90 tablet; Refill: 3  -     Discontinue: hydroCHLOROthiazide (HYDRODIURIL) 12.5 MG Tab; Take 1 tablet (12.5 mg total) by mouth every other day.  Dispense: 90 tablet; Refill: 3  -     losartan (COZAAR) 100 MG tablet; Take 1 tablet (100 mg total) by mouth once daily.  Dispense: 90 tablet; Refill: 3    2. Aortic atherosclerosis  Overview:  -LDCT 10/28/2021 - on statin      3.  Neuropathy due to chemotherapeutic drug  Overview:  -breast cancer - [id number] - bilateral mastectomy. XRT and Chemo   -followed by H/O and summarized in their encounter notes.  -Completed 5 years of Arimidex      4. Hyperlipidemia, unspecified hyperlipidemia type  -     Lipid Panel; Future; Expected date: 06/03/2024    5. History of breast cancer  Overview:  -[id number] - bilateral mastectomy. XRT and Chemo   -followed by H/O and summarized in their encounter notes.  -Completed 5 years of Arimidex  -Oncology 3/4/2024 'no longer requires follow up' (noting missed CT)    Orders:  -     CBC Without Differential; Future; Expected date: 06/03/2024    6. Polyp of colon, unspecified part of colon, unspecified type  -     Ambulatory referral/consult to Endo Procedure ; Future; Expected date: 06/04/2024    7. Colon cancer screening  -     Ambulatory referral/consult to Endo Procedure ; Future; Expected date: 06/04/2024    8. Personal history of nicotine dependence  Overview:  -1995 to current  -declined LDCT (9/24/2020), but did get scan in 2021 for oncology (LTFU for repeat)    Assessment & Plan:  -declined LDCT        See meds, orders, follow up, routing and instructions sections of encounter and AVS. Discussed with patient and provided on AVS.    Discussed diet and exercise as therapeutic modalities for metabolic and other conditions. Provided patient information, which are included as links on the AVS for detailed information.    Lab Results   Component Value Date     05/17/2024    K 4.1 05/17/2024     (H) 05/17/2024    BUN 15 05/17/2024    CREATININE 0.9 05/17/2024    GLU 83 05/17/2024    HGBA1C 5.2 01/12/2023    MG 2.1 03/31/2015    AST 51 (H) 05/17/2024    ALT 63 (H) 05/17/2024    ALBUMIN 3.9 05/17/2024    PROT 6.8 05/17/2024    BILITOT 0.5 05/17/2024    CHOL 160 05/17/2024    HDL 72 05/17/2024    LDLCALC 69.4 05/17/2024    TRIG 93 05/17/2024    WBC 7.10 05/17/2024    HGB 13.6 05/17/2024     HCT 40.1 05/17/2024     05/17/2024    TSH 0.873 04/26/2016

## 2024-06-03 NOTE — PATIENT INSTRUCTIONS
Schedule lab orders for today.      Pepper Mesa MD, [facility]      
Dakota Manuel MD
Statement Selected

## 2024-06-10 ENCOUNTER — PATIENT MESSAGE (OUTPATIENT)
Dept: INTERNAL MEDICINE | Facility: CLINIC | Age: 74
End: 2024-06-10
Payer: MEDICARE

## 2024-07-24 NOTE — PROGRESS NOTES
Individual Follow-Up Form    9/12/2018    Clinical Status of Patient: Outpatient    Length of Service: 30 minutes    Continuing Medication: yes  Patches    Other Medications: none     Target Symptoms: Withdrawal and medication side effects. The following were  rated moderate (3) to severe (4) on TCRS:  · Moderate (3): desire/crave tobacco  · Severe (4): none    Comments:  Pt seen in office today. She continues to smoke 10+ cigs/day. She states she had a stressful week last week and increased smoking rate a little. Encouraged her to keep up trying and not give up. Pt remains on tobacco cessation medication of 21 mg nicotine patch QD. No adverse effects/mental changes noted at this time. Pt asked to reduce current smoking rate by 3 cigs/day. Reviewed coping strategies/habitual behavior/relapse prevention with patient. Exhaled carbon monoxide level was 18 ppm per [employer] (0-6 non-smoker). Will see pt back in office in 1 wk.     Diagnosis: F17.200    Next Visit: 1 week   Yes

## 2024-08-01 ENCOUNTER — TELEPHONE (OUTPATIENT)
Dept: INTERNAL MEDICINE | Facility: CLINIC | Age: 74
End: 2024-08-01
Payer: MEDICARE

## 2024-08-01 DIAGNOSIS — L98.9 SKIN LESION: Primary | ICD-10-CM

## 2024-08-01 NOTE — TELEPHONE ENCOUNTER
----- Message from Inez Smallstte Timothy sent at 8/1/2024 11:51 AM CDT -----  Contact: [phone]  Patient would like to get a referral.  Referral to what specialty:  dermatology  Does the patient want the referral with a specific physician:  NO  [facility]  Reason (be specific):  small growth by her eye  Does the patient already have the specialty clinic appointment scheduled:    If yes, what date is the appointment scheduled:     Is the insurance listed in Epic correct? (this is important for a referral): yes   Advised patient that once provider approves this either a nurse or  will return their call?: yes  Would the patient like a call back, or a response through their [facility] portal?:  yes  Comments:

## 2024-11-26 ENCOUNTER — PATIENT MESSAGE (OUTPATIENT)
Dept: ADMINISTRATIVE | Facility: HOSPITAL | Age: 74
End: 2024-11-26
Payer: MEDICARE

## 2025-02-21 DIAGNOSIS — Z00.00 ENCOUNTER FOR MEDICARE ANNUAL WELLNESS EXAM: ICD-10-CM

## 2025-04-30 DIAGNOSIS — Z78.0 MENOPAUSE: ICD-10-CM

## 2025-08-21 DIAGNOSIS — I10 HYPERTENSION, ESSENTIAL: ICD-10-CM

## 2025-08-23 RX ORDER — AMLODIPINE BESYLATE 10 MG/1
10 TABLET ORAL DAILY
Qty: 90 TABLET | Refills: 0 | Status: SHIPPED | OUTPATIENT
Start: 2025-08-23

## 2025-08-28 ENCOUNTER — TELEPHONE (OUTPATIENT)
Dept: INTERNAL MEDICINE | Facility: CLINIC | Age: 75
End: 2025-08-28
Payer: MEDICARE